# Patient Record
Sex: FEMALE | Race: WHITE | Employment: OTHER | URBAN - NONMETROPOLITAN AREA
[De-identification: names, ages, dates, MRNs, and addresses within clinical notes are randomized per-mention and may not be internally consistent; named-entity substitution may affect disease eponyms.]

---

## 2018-01-23 LAB
CHOLESTEROL, TOTAL: 264 MG/DL
CHOLESTEROL/HDL RATIO: ABNORMAL
HDLC SERPL-MCNC: 56 MG/DL (ref 35–70)
LDL CHOLESTEROL CALCULATED: 174 MG/DL (ref 0–160)
TRIGL SERPL-MCNC: 186 MG/DL
VLDLC SERPL CALC-MCNC: ABNORMAL MG/DL

## 2018-07-30 LAB
AVERAGE GLUCOSE: NORMAL
HBA1C MFR BLD: 6 %

## 2019-06-29 ENCOUNTER — OFFICE VISIT (OUTPATIENT)
Dept: FAMILY MEDICINE CLINIC | Age: 74
End: 2019-06-29
Payer: MEDICARE

## 2019-06-29 VITALS
HEART RATE: 87 BPM | TEMPERATURE: 97 F | OXYGEN SATURATION: 94 % | SYSTOLIC BLOOD PRESSURE: 122 MMHG | DIASTOLIC BLOOD PRESSURE: 82 MMHG | HEIGHT: 64 IN | BODY MASS INDEX: 35.27 KG/M2 | WEIGHT: 206.6 LBS

## 2019-06-29 DIAGNOSIS — R09.82 POSTNASAL DRIP: ICD-10-CM

## 2019-06-29 DIAGNOSIS — L03.213 PRESEPTAL CELLULITIS OF RIGHT LOWER EYELID: ICD-10-CM

## 2019-06-29 DIAGNOSIS — H10.9 CONJUNCTIVITIS OF BOTH EYES, UNSPECIFIED CONJUNCTIVITIS TYPE: ICD-10-CM

## 2019-06-29 DIAGNOSIS — J01.90 ACUTE NON-RECURRENT SINUSITIS, UNSPECIFIED LOCATION: Primary | ICD-10-CM

## 2019-06-29 PROCEDURE — 99214 OFFICE O/P EST MOD 30 MIN: CPT | Performed by: PHYSICIAN ASSISTANT

## 2019-06-29 PROCEDURE — 96372 THER/PROPH/DIAG INJ SC/IM: CPT | Performed by: PHYSICIAN ASSISTANT

## 2019-06-29 RX ORDER — CEFTRIAXONE 1 G/1
1 INJECTION, POWDER, FOR SOLUTION INTRAMUSCULAR; INTRAVENOUS ONCE
Status: COMPLETED | OUTPATIENT
Start: 2019-06-29 | End: 2019-06-29

## 2019-06-29 RX ORDER — SULFAMETHOXAZOLE AND TRIMETHOPRIM 800; 160 MG/1; MG/1
1 TABLET ORAL 2 TIMES DAILY
Qty: 20 TABLET | Refills: 0 | Status: SHIPPED | OUTPATIENT
Start: 2019-06-29 | End: 2019-07-09

## 2019-06-29 RX ORDER — CEFDINIR 300 MG/1
300 CAPSULE ORAL 2 TIMES DAILY
Qty: 20 CAPSULE | Refills: 0 | Status: SHIPPED | OUTPATIENT
Start: 2019-06-29 | End: 2019-07-09

## 2019-06-29 RX ADMIN — CEFTRIAXONE 1 G: 1 INJECTION, POWDER, FOR SOLUTION INTRAMUSCULAR; INTRAVENOUS at 10:14

## 2019-06-29 NOTE — PROGRESS NOTES
19  Sandra Reynolds : 1945 Sex: female  Age 76 y.o. Subjective:  Chief Complaint   Patient presents with    Conjunctivitis     right eye is very red, irritated and swollen and left is red x's 2 days         HPI:   Sandra Reynolds , 76 y.o. female presents to [facility] care for evaluation of conjunctivitis. The patient has had right eye redness and now developing left eye redness. The patient went to the emergency department last night and was placed on Cipro drops. The patient has been taking the Cipro drops and the swelling and redness have been improving but not completely gone. The patient is still having some nasal congestion rhinorrhea. The patient said any chest pain, shortness of breath. The patient and  states that the swelling is improving at this point but is not completely resolved. The patient is not having any fevers or chills. No visual disturbances. The patient does wear glasses. She does not wear contact lenses. The patient denies any chest pain, shortness of breath. Grandchildren had the same and then ultimately had to be placed on oral antibiotics as well. ROS:   Unless otherwise stated in this report the patient's positive and negative responses for review of systems for constitutional, eyes, ENT, cardiovascular, respiratory, gastrointestinal, neurological, , musculoskeletal, and integument systems and related systems to the presenting problem are either stated in the history of present illness or were not pertinent or were negative for the symptoms and/or complaints related to the presenting medical problem. Positives and pertinent negatives as per HPI. All others reviewed and are negative.       PMH:     Past Medical History:   Diagnosis Date    Renal calculus        Past Surgical History:   Procedure Laterality Date    LITHOTRIPSY Left [id number]    OVARIAN CYST REMOVAL       Medications:     Current Outpatient Medications:     Multiple Vitamins-Minerals (THERAPEUTIC MULTIVITAMIN-MINERALS) tablet, Take 1 tablet by mouth daily, Disp: , Rfl:     MAGNESIUM CITRATE PO, Take 200 mg by mouth daily, Disp: , Rfl:     NONFORMULARY, Take by mouth 2 times daily HERBAL STONE FREE TAKES 2 IN AM AND 3 AT HS, Disp: , Rfl:     NONFORMULARY, Take by mouth daily QUERDECIN WITH BROMELAIN, Disp: , Rfl:     UNABLE TO FIND, daily SILVER BIOTIC, Disp: , Rfl:     Coenzyme Q10 (CO Q 10) 100 MG CAPS, Take 100 mg by mouth 2 times daily, Disp: , Rfl:     Pyridoxine HCl (VITAMIN B-6) 100 MG tablet, Take 100 mg by mouth daily, Disp: , Rfl:     NONFORMULARY, 2 times daily VISION ESSENCE, Disp: , Rfl:     NONFORMULARY, 2 times daily KYOLIC  FORMULA 278 - TAKES 2 IN AM AND 2 IN PM,, Disp: , Rfl:     ondansetron (ZOFRAN) 4 MG tablet, Take 1 tablet by mouth every 8 hours as needed for Nausea or Vomiting, Disp: 20 tablet, Rfl: 0    Allergies: Allergies   Allergen Reactions    Claritin [Loratadine] Other (See Comments)     Generalized severe weakness, couldn't move arms or hardly walk    Pcn [Penicillins]        Social History:     Social History     Tobacco Use    Smoking status: Never Smoker    Smokeless tobacco: Never Used   Substance Use Topics    Alcohol use: No    Drug use: No       Physical Exam:     Vitals:    06/29/19 0946   BP: 122/82   Site: Right Upper Arm   Position: Sitting   Cuff Size: Medium Adult   Pulse: 87   Temp: 97 °F (36.1 °C)   SpO2: 94%   Weight: 206 lb 9.6 oz (93.7 kg)   Height: 5' 4\" (1.626 m)       Exam:  Physical Exam  Nurse's notes and vital signs reviewed. The patient is not hypoxic. General: Alert, no acute distress, patient resting comfortably Patient is not toxic or lethargic. Skin: Warm, intact, no pallor noted. There is no evidence of rash at this time. Head: Normocephalic, atraumatic.   Eye: Significant erythema to the right eye, the patient has drainage and discharge noted to both eyes, the patient has worsening redness surrounding the right eye. Although  states that this is significantly improved from yesterday. The patient has no significant tenderness, PERRLA, EOMI, no pain with extraocular eye motion. Ears, Nose, Throat: Right tympanic membrane clear, left tympanic membrane clear. No drainage or discharge noted. No pre- or post-auricular tenderness, erythema, or swelling noted. Moderate nasal congestion rhinorrhea. No facial erythema. Posterior oropharynx shows erythema cobblestoning, but no hypertrophy, asymmetry or peritonsillar abscess and no evidence of exudate. the uvula is midline. No trismus or drooling is noted. Moist mucous membranes. Neck: No anterior/posterior lymphadenopathy noted. No erythema, no masses, no fluctuance or induration noted. No meningeal signs. Cardio: Regular Rate and Rhythm  Respiratory: No acute distress, no rhonchi, wheezing or crackles noted. No stridor or retractions are noted. Neurological: A&O x4, normal speech  Psychiatric: Cooperative         Testing:           Medical Decision Making:     I reviewed the ER report that they had with her. The patient will be continuing on the ciprofloxacin drops. She will be placing the drops in both eyes at this point. We will treat the patient for potential preseptal cellulitis to the right eye with Rocephin and will start the patient on Omnicef and cephalexin. The patient will monitor the area closely. The patient was given intramuscular injection of Rocephin here. The patient will continue the Cipro drops. We will have her back Monday for repeat evaluation. The patient will take the medications around the clock. Patient will present Monday morning for repeat evaluation repeat assessment. The patient was comfortable with this plan has no other questions or concerns at this time. We also discussed the worsening signs and symptoms to go to the emergency department for. She is to use warm compresses.   She will use universal precautions, lots of handwashing      Clinical Impression:   Debi Vila was seen today for conjunctivitis. Diagnoses and all orders for this visit:    Acute non-recurrent sinusitis, unspecified location    Postnasal drip    Conjunctivitis of both eyes, unspecified conjunctivitis type    Preseptal cellulitis of right lower eyelid    Other orders  -     cefdinir (OMNICEF) 300 MG capsule; Take 1 capsule by mouth 2 times daily for 10 days  -     cefTRIAXone (ROCEPHIN) injection 1 g  -     sulfamethoxazole-trimethoprim (BACTRIM DS;SEPTRA DS) 800-160 MG per tablet; Take 1 tablet by mouth 2 times daily for 10 days        The patient is to call for any concerns or return if any of the signs or symptoms worsen. The patient is to follow-up with PCP in the next 2-3 days for repeat evaluation repeat assessment or go directly to the emergency department.      SIGNATURE: Clifford Everett III, PA-C

## 2019-07-01 ENCOUNTER — OFFICE VISIT (OUTPATIENT)
Dept: FAMILY MEDICINE CLINIC | Age: 74
End: 2019-07-01
Payer: MEDICARE

## 2019-07-01 VITALS
WEIGHT: 206.38 LBS | TEMPERATURE: 97.4 F | OXYGEN SATURATION: 97 % | DIASTOLIC BLOOD PRESSURE: 80 MMHG | SYSTOLIC BLOOD PRESSURE: 132 MMHG | BODY MASS INDEX: 35.42 KG/M2 | HEART RATE: 80 BPM

## 2019-07-01 DIAGNOSIS — H10.33 ACUTE BACTERIAL CONJUNCTIVITIS OF BOTH EYES: Primary | ICD-10-CM

## 2019-07-01 PROCEDURE — 99213 OFFICE O/P EST LOW 20 MIN: CPT | Performed by: FAMILY MEDICINE

## 2019-07-01 RX ORDER — LATANOPROST 50 UG/ML
SOLUTION/ DROPS OPHTHALMIC
Refills: 11 | COMMUNITY
Start: 2019-05-29 | End: 2022-06-29

## 2019-07-01 RX ORDER — CIPROFLOXACIN HYDROCHLORIDE 3.5 MG/ML
SOLUTION/ DROPS TOPICAL
Refills: 0 | COMMUNITY
Start: 2019-06-28 | End: 2019-08-08 | Stop reason: ALTCHOICE

## 2019-07-01 ASSESSMENT — ENCOUNTER SYMPTOMS
RHINORRHEA: 1
SORE THROAT: 0
EYE ITCHING: 1
PHOTOPHOBIA: 0
EYE DISCHARGE: 0
DOUBLE VISION: 0
EYE PAIN: 0
EYE REDNESS: 1

## 2019-07-01 NOTE — PROGRESS NOTES
MAGNESIUM CITRATE PO, Take 200 mg by mouth daily, Disp: , Rfl:     NONFORMULARY, Take by mouth 2 times daily HERBAL STONE FREE TAKES 2 IN AM AND 3 AT HS, Disp: , Rfl:     NONFORMULARY, Take by mouth daily QUERDECIN WITH BROMELAIN, Disp: , Rfl:     UNABLE TO FIND, daily SILVER BIOTIC, Disp: , Rfl:     Coenzyme Q10 (CO Q 10) 100 MG CAPS, Take 100 mg by mouth 2 times daily, Disp: , Rfl:     Pyridoxine HCl (VITAMIN B-6) 100 MG tablet, Take 100 mg by mouth daily, Disp: , Rfl:     NONFORMULARY, 2 times daily VISION ESSENCE, Disp: , Rfl:     NONFORMULARY, 2 times daily KYOLIC  FORMULA 908 - TAKES 2 IN AM AND 2 IN PM,, Disp: , Rfl:     ondansetron (ZOFRAN) 4 MG tablet, Take 1 tablet by mouth every 8 hours as needed for Nausea or Vomiting, Disp: 20 tablet, Rfl: 0  Allergies   Allergen Reactions    Claritin [Loratadine] Other (See Comments)     Generalized severe weakness, couldn't move arms or hardly walk    Pcn [Penicillins]     Betamethasone Dipropionate Rash        /80   Pulse 80   Temp 97.4 °F (36.3 °C)   Wt 206 lb 6 oz (93.6 kg)   SpO2 97%   BMI 35.42 kg/m²     EXAM:   Physical Exam   Constitutional: She appears well-developed and well-nourished. HENT:   Head: Normocephalic and atraumatic.   conjuctiva very injected but no eye lid swelling and no crusting about the eyes   Eyes: Pupils are equal, round, and reactive to light. EOM are normal.   Neck: Normal range of motion. Cardiovascular: Normal rate and regular rhythm. Pulmonary/Chest: Effort normal and breath sounds normal.   Skin: Skin is warm and dry. Nursing note and vitals reviewed. Andrew Curtis was seen today for conjunctivitis. Diagnoses and all orders for this visit:    Acute bacterial conjunctivitis of both eyes    bactrim and eye drops  Continue both  F/u with ophthmologist         Seen by:   Binh Glass DO

## 2019-08-08 ENCOUNTER — OFFICE VISIT (OUTPATIENT)
Dept: FAMILY MEDICINE CLINIC | Age: 74
End: 2019-08-08
Payer: MEDICARE

## 2019-08-08 VITALS
HEIGHT: 64 IN | DIASTOLIC BLOOD PRESSURE: 88 MMHG | BODY MASS INDEX: 35.17 KG/M2 | OXYGEN SATURATION: 97 % | WEIGHT: 206 LBS | SYSTOLIC BLOOD PRESSURE: 136 MMHG | HEART RATE: 86 BPM

## 2019-08-08 DIAGNOSIS — M15.9 PRIMARY OSTEOARTHRITIS INVOLVING MULTIPLE JOINTS: ICD-10-CM

## 2019-08-08 DIAGNOSIS — I10 ESSENTIAL HYPERTENSION: Primary | ICD-10-CM

## 2019-08-08 DIAGNOSIS — Z12.39 SCREENING FOR BREAST CANCER: ICD-10-CM

## 2019-08-08 DIAGNOSIS — Z11.59 ENCOUNTER FOR HEPATITIS C SCREENING TEST FOR LOW RISK PATIENT: ICD-10-CM

## 2019-08-08 DIAGNOSIS — E78.49 OTHER HYPERLIPIDEMIA: ICD-10-CM

## 2019-08-08 DIAGNOSIS — Z23 IMMUNIZATION DUE: ICD-10-CM

## 2019-08-08 PROBLEM — M19.90 OSTEOARTHRITIS: Status: ACTIVE | Noted: 2019-08-08

## 2019-08-08 PROCEDURE — 99214 OFFICE O/P EST MOD 30 MIN: CPT | Performed by: FAMILY MEDICINE

## 2019-08-08 RX ORDER — VIT C/B6/B5/MAGNESIUM/HERB 173 50-5-6-5MG
CAPSULE ORAL
COMMUNITY

## 2019-08-08 RX ORDER — CHLORAL HYDRATE 500 MG
1000 CAPSULE ORAL DAILY
COMMUNITY

## 2019-08-08 ASSESSMENT — PATIENT HEALTH QUESTIONNAIRE - PHQ9
SUM OF ALL RESPONSES TO PHQ QUESTIONS 1-9: 0
SUM OF ALL RESPONSES TO PHQ QUESTIONS 1-9: 0
SUM OF ALL RESPONSES TO PHQ9 QUESTIONS 1 & 2: 0
1. LITTLE INTEREST OR PLEASURE IN DOING THINGS: 0
2. FEELING DOWN, DEPRESSED OR HOPELESS: 0

## 2019-08-08 NOTE — PROGRESS NOTES
OFFICE NOTE    19  Name: Mahsa Allen  :1945   Sex:female   Age:74 y.o. SUBJECTIVE  Chief Complaint   Patient presents with    6 Month Follow-Up       HPI Comes in for checkup and refills. On  A number of herbs and vitamins. Feels she is doing well        Review of Systems   Constitutional: Negative for appetite change, fever and unexpected weight change. HENT: Negative for congestion, ear pain and postnasal drip. Eyes: Negative. Negative for photophobia, redness and visual disturbance. Respiratory: Negative for cough, chest tightness and wheezing. Cardiovascular: Negative for chest pain and palpitations. Gastrointestinal: Negative for abdominal pain, blood in stool, constipation, diarrhea and vomiting. Endocrine: Negative for cold intolerance, polydipsia and polyuria. Genitourinary: Negative for dysuria and hematuria. Musculoskeletal: Positive for arthralgias. Negative for gait problem and joint swelling. Skin: Negative for rash and wound. Allergic/Immunologic: Negative for environmental allergies and food allergies. Neurological: Negative for dizziness, tremors, weakness, numbness and headaches. Hematological: Negative for adenopathy. Does not bruise/bleed easily. Psychiatric/Behavioral: Negative for behavioral problems, confusion, dysphoric mood and sleep disturbance. The patient is nervous/anxious.              Current Outpatient Medications:     Omega-3 Fatty Acids (FISH OIL) 1000 MG CAPS, Take 1,000 mg by mouth daily, Disp: , Rfl:     Cholecalciferol (VITAMIN D3) 5000 units TABS, Take 1 tablet by mouth daily, Disp: , Rfl:     POLICOSANOL PO, Take by mouth, Disp: , Rfl:     Turmeric 500 MG CAPS, Take by mouth, Disp: , Rfl:     MAGNESIUM PO, Take by mouth, Disp: , Rfl:     latanoprost (XALATAN) 0.005 % ophthalmic solution, U 1 GTT IN OU QHS, Disp: , Rfl: 11    Multiple Vitamins-Minerals (THERAPEUTIC MULTIVITAMIN-MINERALS) tablet, Take 1 tablet by mouth daily, Disp: , Rfl:     Coenzyme Q10 (CO Q 10) 100 MG CAPS, Take 100 mg by mouth 2 times daily, Disp: , Rfl:     NONFORMULARY, 2 times daily VISION ESSENCE, Disp: , Rfl:     NONFORMULARY, 2 times daily KYOLIC  FORMULA 615 - TAKES 2 IN AM AND 2 IN PM,, Disp: , Rfl:   Allergies   Allergen Reactions    Claritin [Loratadine] Other (See Comments)     Generalized severe weakness, couldn't move arms or hardly walk    Pcn [Penicillins]     Betamethasone Dipropionate Rash       Past Medical History:   Diagnosis Date    Breast cyst, right     benign    History of fracture of upper extremity     bilateral, fell off a horse and out of a tree    History of ovarian cyst     Hypertension     Osteoarthritis     knee    Renal calculus      Past Surgical History:   Procedure Laterality Date    LITHOTRIPSY Left [id number]    OTHER SURGICAL HISTORY      has acupuncture tx with Dr. Umair Dickens in [address] [place] [address]       Family History   Problem Relation Age of Onset    Ovarian Cancer Mother     Heart Failure Father     Other Father         AAA    No Known Problems Sister     Alcohol Abuse Brother     Stroke Sister         small    Arthritis Sister         TKR     Social History     Tobacco History     Smoking Status  Never Smoker    Smokeless Tobacco Use  Never Used          Alcohol History     Alcohol Use Status  No Comment  occasional glass of wine          Drug Use     Drug Use Status  No          Sexual Activity     Sexually Active  Not Asked                OBJECTIVE  Vitals:    08/08/19 1403   BP: 136/88   Pulse: 86   SpO2: 97%   Weight: 206 lb (93.4 kg)   Height: 5' 4\" (1.626 m)       Body mass index is 35.36 kg/m².     Orders Placed This Encounter   Procedures    JULIEN CAD SCREENING     Standing Status:   Future     Standing Expiration Date:   10/8/2020    CBC Auto Differential     Standing Status:   Future     Standing Expiration Date:   8/8/2020    Comprehensive Metabolic Panel     Standing Status:

## 2019-08-09 ASSESSMENT — ENCOUNTER SYMPTOMS
CONSTIPATION: 0
EYES NEGATIVE: 1
WHEEZING: 0
EYE REDNESS: 0
COUGH: 0
PHOTOPHOBIA: 0
DIARRHEA: 0
ABDOMINAL PAIN: 0
CHEST TIGHTNESS: 0
VOMITING: 0
BLOOD IN STOOL: 0

## 2019-08-29 ENCOUNTER — HOSPITAL ENCOUNTER (OUTPATIENT)
Age: 74
Discharge: HOME | End: 2019-08-31
Payer: MEDICARE

## 2019-08-29 DIAGNOSIS — Z11.59 ENCOUNTER FOR HEPATITIS C SCREENING TEST FOR LOW RISK PATIENT: ICD-10-CM

## 2019-08-29 DIAGNOSIS — E78.49 OTHER HYPERLIPIDEMIA: ICD-10-CM

## 2019-08-29 DIAGNOSIS — I10 ESSENTIAL HYPERTENSION: ICD-10-CM

## 2019-08-29 LAB
ALBUMIN SERPL-MCNC: 4.3 G/DL (ref 3.5–5.2)
ALP BLD-CCNC: 54 U/L (ref 35–104)
ALT SERPL-CCNC: 20 U/L (ref 0–32)
ANION GAP SERPL CALCULATED.3IONS-SCNC: 15 MMOL/L (ref 7–16)
AST SERPL-CCNC: 16 U/L (ref 0–31)
BASOPHILS ABSOLUTE: 0.04 E9/L (ref 0–0.2)
BASOPHILS RELATIVE PERCENT: 0.8 % (ref 0–2)
BILIRUB SERPL-MCNC: 0.5 MG/DL (ref 0–1.2)
BUN BLDV-MCNC: 19 MG/DL (ref 8–23)
CALCIUM SERPL-MCNC: 9.7 MG/DL (ref 8.6–10.2)
CHLORIDE BLD-SCNC: 103 MMOL/L (ref 98–107)
CHOLESTEROL, TOTAL: 291 MG/DL (ref 0–199)
CO2: 24 MMOL/L (ref 22–29)
CREAT SERPL-MCNC: 0.8 MG/DL (ref 0.5–1)
EOSINOPHILS ABSOLUTE: 0.11 E9/L (ref 0.05–0.5)
EOSINOPHILS RELATIVE PERCENT: 2.1 % (ref 0–6)
GFR AFRICAN AMERICAN: >60
GFR NON-AFRICAN AMERICAN: >60 ML/MIN/1.73
GLUCOSE BLD-MCNC: 118 MG/DL (ref 74–99)
HCT VFR BLD CALC: 46.2 % (ref 34–48)
HDLC SERPL-MCNC: 63 MG/DL
HEMOGLOBIN: 14.9 G/DL (ref 11.5–15.5)
IMMATURE GRANULOCYTES #: 0.01 E9/L
IMMATURE GRANULOCYTES %: 0.2 % (ref 0–5)
LDL CHOLESTEROL CALCULATED: 204 MG/DL (ref 0–99)
LYMPHOCYTES ABSOLUTE: 1.42 E9/L (ref 1.5–4)
LYMPHOCYTES RELATIVE PERCENT: 27.6 % (ref 20–42)
MCH RBC QN AUTO: 30 PG (ref 26–35)
MCHC RBC AUTO-ENTMCNC: 32.3 % (ref 32–34.5)
MCV RBC AUTO: 93.1 FL (ref 80–99.9)
MONOCYTES ABSOLUTE: 0.62 E9/L (ref 0.1–0.95)
MONOCYTES RELATIVE PERCENT: 12.1 % (ref 2–12)
NEUTROPHILS ABSOLUTE: 2.94 E9/L (ref 1.8–7.3)
NEUTROPHILS RELATIVE PERCENT: 57.2 % (ref 43–80)
PDW BLD-RTO: 13.3 FL (ref 11.5–15)
PLATELET # BLD: 259 E9/L (ref 130–450)
PMV BLD AUTO: 10.7 FL (ref 7–12)
POTASSIUM SERPL-SCNC: 4.4 MMOL/L (ref 3.5–5)
RBC # BLD: 4.96 E12/L (ref 3.5–5.5)
SODIUM BLD-SCNC: 142 MMOL/L (ref 132–146)
TOTAL PROTEIN: 7.4 G/DL (ref 6.4–8.3)
TRIGL SERPL-MCNC: 121 MG/DL (ref 0–149)
TSH SERPL DL<=0.05 MIU/L-ACNC: 8.34 UIU/ML (ref 0.27–4.2)
VLDLC SERPL CALC-MCNC: 24 MG/DL
WBC # BLD: 5.1 E9/L (ref 4.5–11.5)

## 2019-08-29 PROCEDURE — 36415 COLL VENOUS BLD VENIPUNCTURE: CPT

## 2019-08-29 PROCEDURE — 84443 ASSAY THYROID STIM HORMONE: CPT

## 2019-08-29 PROCEDURE — 80061 LIPID PANEL: CPT

## 2019-08-29 PROCEDURE — 80053 COMPREHEN METABOLIC PANEL: CPT

## 2019-08-29 PROCEDURE — 85025 COMPLETE CBC W/AUTO DIFF WBC: CPT

## 2019-08-29 PROCEDURE — 86803 HEPATITIS C AB TEST: CPT

## 2019-08-30 LAB — HEPATITIS C ANTIBODY INTERPRETATION: NORMAL

## 2020-02-20 ENCOUNTER — OFFICE VISIT (OUTPATIENT)
Dept: FAMILY MEDICINE CLINIC | Age: 75
End: 2020-02-20
Payer: MEDICARE

## 2020-02-20 VITALS
WEIGHT: 208 LBS | BODY MASS INDEX: 35.7 KG/M2 | OXYGEN SATURATION: 96 % | HEART RATE: 74 BPM | TEMPERATURE: 98.2 F | DIASTOLIC BLOOD PRESSURE: 80 MMHG | SYSTOLIC BLOOD PRESSURE: 126 MMHG

## 2020-02-20 PROBLEM — H40.1190 PRIMARY OPEN ANGLE GLAUCOMA (POAG): Status: ACTIVE | Noted: 2020-02-20

## 2020-02-20 PROBLEM — R73.9 HYPERGLYCEMIA: Status: ACTIVE | Noted: 2020-02-20

## 2020-02-20 PROBLEM — E78.41 ELEVATED LIPOPROTEIN(A): Status: ACTIVE | Noted: 2020-02-20

## 2020-02-20 LAB
BILIRUBIN, POC: NEGATIVE
BLOOD URINE, POC: ABNORMAL
CHP ED QC CHECK: NORMAL
CLARITY, POC: CLEAR
COLOR, POC: YELLOW
GLUCOSE BLD-MCNC: 90 MG/DL
GLUCOSE URINE, POC: NEGATIVE
HBA1C MFR BLD: 5.5 %
KETONES, POC: NEGATIVE
LEUKOCYTE EST, POC: ABNORMAL
NITRITE, POC: NEGATIVE
PH, POC: 6.5
PROTEIN, POC: NEGATIVE
SPECIFIC GRAVITY, POC: 1.02
UROBILINOGEN, POC: ABNORMAL

## 2020-02-20 PROCEDURE — 1036F TOBACCO NON-USER: CPT | Performed by: FAMILY MEDICINE

## 2020-02-20 PROCEDURE — 82962 GLUCOSE BLOOD TEST: CPT | Performed by: FAMILY MEDICINE

## 2020-02-20 PROCEDURE — G8484 FLU IMMUNIZE NO ADMIN: HCPCS | Performed by: FAMILY MEDICINE

## 2020-02-20 PROCEDURE — 4040F PNEUMOC VAC/ADMIN/RCVD: CPT | Performed by: FAMILY MEDICINE

## 2020-02-20 PROCEDURE — 81002 URINALYSIS NONAUTO W/O SCOPE: CPT | Performed by: FAMILY MEDICINE

## 2020-02-20 PROCEDURE — G8400 PT W/DXA NO RESULTS DOC: HCPCS | Performed by: FAMILY MEDICINE

## 2020-02-20 PROCEDURE — 83036 HEMOGLOBIN GLYCOSYLATED A1C: CPT | Performed by: FAMILY MEDICINE

## 2020-02-20 PROCEDURE — 93000 ELECTROCARDIOGRAM COMPLETE: CPT | Performed by: FAMILY MEDICINE

## 2020-02-20 PROCEDURE — 99214 OFFICE O/P EST MOD 30 MIN: CPT | Performed by: FAMILY MEDICINE

## 2020-02-20 PROCEDURE — 1123F ACP DISCUSS/DSCN MKR DOCD: CPT | Performed by: FAMILY MEDICINE

## 2020-02-20 PROCEDURE — G8427 DOCREV CUR MEDS BY ELIG CLIN: HCPCS | Performed by: FAMILY MEDICINE

## 2020-02-20 PROCEDURE — 3017F COLORECTAL CA SCREEN DOC REV: CPT | Performed by: FAMILY MEDICINE

## 2020-02-20 PROCEDURE — 1090F PRES/ABSN URINE INCON ASSESS: CPT | Performed by: FAMILY MEDICINE

## 2020-02-20 PROCEDURE — G8417 CALC BMI ABV UP PARAM F/U: HCPCS | Performed by: FAMILY MEDICINE

## 2020-02-20 RX ORDER — TRIAMCINOLONE ACETONIDE 1 MG/G
CREAM TOPICAL
Qty: 1 TUBE | Refills: 2 | Status: SHIPPED
Start: 2020-02-20 | End: 2020-08-25 | Stop reason: SDUPTHER

## 2020-02-20 ASSESSMENT — ENCOUNTER SYMPTOMS
COUGH: 0
EYES NEGATIVE: 1
WHEEZING: 0
ABDOMINAL PAIN: 0
VOMITING: 0
DIARRHEA: 0
BLOOD IN STOOL: 0
CONSTIPATION: 0
CHEST TIGHTNESS: 0

## 2020-02-20 ASSESSMENT — PATIENT HEALTH QUESTIONNAIRE - PHQ9
SUM OF ALL RESPONSES TO PHQ QUESTIONS 1-9: 0
1. LITTLE INTEREST OR PLEASURE IN DOING THINGS: 0
2. FEELING DOWN, DEPRESSED OR HOPELESS: 0
SUM OF ALL RESPONSES TO PHQ9 QUESTIONS 1 & 2: 0
SUM OF ALL RESPONSES TO PHQ QUESTIONS 1-9: 0

## 2020-02-20 NOTE — PROGRESS NOTES
OFFICE NOTE    20  Name: Bulmaro Huddleston  :1945   Sex:female   Age:75 y.o. SUBJECTIVE  Chief Complaint   Patient presents with    Rash     needs new Rx for triamcinolone cream       Pt presents for routine follow up. Requires routine medication refills. UTD on vaccinations. Denies new s/s or complaints. Had mammogram done at [facility]. Refuses flu shot. Avoids meds as much as she possibley can           Review of Systems   Constitutional: Negative for appetite change, fever and unexpected weight change. HENT: Negative for congestion, ear pain and postnasal drip. Eyes: Negative. Respiratory: Negative for cough, chest tightness and wheezing. Cardiovascular: Negative for chest pain and palpitations. Gastrointestinal: Negative for abdominal pain, blood in stool, constipation, diarrhea and vomiting. Endocrine: Negative for cold intolerance, polydipsia and polyuria. Genitourinary: Negative for dysuria and hematuria. Musculoskeletal: Negative for arthralgias, gait problem and joint swelling. Skin: Positive for rash. Negative for wound. Allergic/Immunologic: Negative for environmental allergies and food allergies. Neurological: Negative for dizziness, tremors, weakness and headaches. Hematological: Negative for adenopathy. Does not bruise/bleed easily. Psychiatric/Behavioral: Negative for behavioral problems, confusion, dysphoric mood and sleep disturbance. Current Outpatient Medications:     triamcinolone (KENALOG) 0.1 % cream, Apply topically 2 times daily. , Disp: 1 Tube, Rfl: 2    Omega-3 Fatty Acids (FISH OIL) 1000 MG CAPS, Take 1,000 mg by mouth daily, Disp: , Rfl:     Cholecalciferol (VITAMIN D3) 5000 units TABS, Take 1 tablet by mouth daily, Disp: , Rfl:     POLICOSANOL PO, Take by mouth, Disp: , Rfl:     Turmeric 500 MG CAPS, Take by mouth, Disp: , Rfl:     MAGNESIUM PO, Take by mouth, Disp: , Rfl:     latanoprost (XALATAN) 0.005 % ophthalmic solution, U

## 2020-08-25 ENCOUNTER — OFFICE VISIT (OUTPATIENT)
Dept: FAMILY MEDICINE CLINIC | Age: 75
End: 2020-08-25
Payer: MEDICARE

## 2020-08-25 VITALS
WEIGHT: 206.8 LBS | HEART RATE: 84 BPM | HEIGHT: 65 IN | BODY MASS INDEX: 34.45 KG/M2 | DIASTOLIC BLOOD PRESSURE: 84 MMHG | TEMPERATURE: 97.8 F | OXYGEN SATURATION: 95 % | SYSTOLIC BLOOD PRESSURE: 118 MMHG

## 2020-08-25 PROBLEM — E66.01 MORBIDLY OBESE (HCC): Status: ACTIVE | Noted: 2020-08-25

## 2020-08-25 PROCEDURE — 4040F PNEUMOC VAC/ADMIN/RCVD: CPT | Performed by: FAMILY MEDICINE

## 2020-08-25 PROCEDURE — 1036F TOBACCO NON-USER: CPT | Performed by: FAMILY MEDICINE

## 2020-08-25 PROCEDURE — 1123F ACP DISCUSS/DSCN MKR DOCD: CPT | Performed by: FAMILY MEDICINE

## 2020-08-25 PROCEDURE — 1090F PRES/ABSN URINE INCON ASSESS: CPT | Performed by: FAMILY MEDICINE

## 2020-08-25 PROCEDURE — 93000 ELECTROCARDIOGRAM COMPLETE: CPT | Performed by: FAMILY MEDICINE

## 2020-08-25 PROCEDURE — G8427 DOCREV CUR MEDS BY ELIG CLIN: HCPCS | Performed by: FAMILY MEDICINE

## 2020-08-25 PROCEDURE — 3017F COLORECTAL CA SCREEN DOC REV: CPT | Performed by: FAMILY MEDICINE

## 2020-08-25 PROCEDURE — 99214 OFFICE O/P EST MOD 30 MIN: CPT | Performed by: FAMILY MEDICINE

## 2020-08-25 PROCEDURE — G8400 PT W/DXA NO RESULTS DOC: HCPCS | Performed by: FAMILY MEDICINE

## 2020-08-25 PROCEDURE — G8417 CALC BMI ABV UP PARAM F/U: HCPCS | Performed by: FAMILY MEDICINE

## 2020-08-25 RX ORDER — TRIAMCINOLONE ACETONIDE 1 MG/G
CREAM TOPICAL
Qty: 1 TUBE | Refills: 2 | Status: SHIPPED
Start: 2020-08-25 | End: 2021-08-25 | Stop reason: SDUPTHER

## 2020-08-25 ASSESSMENT — PATIENT HEALTH QUESTIONNAIRE - PHQ9
2. FEELING DOWN, DEPRESSED OR HOPELESS: 0
1. LITTLE INTEREST OR PLEASURE IN DOING THINGS: 0
SUM OF ALL RESPONSES TO PHQ QUESTIONS 1-9: 0
SUM OF ALL RESPONSES TO PHQ9 QUESTIONS 1 & 2: 0
SUM OF ALL RESPONSES TO PHQ QUESTIONS 1-9: 0

## 2020-08-25 NOTE — PROGRESS NOTES
OFFICE NOTE    20  Name: Satnam Brooks  :1945   Sex:female   Age:75 y.o. SUBJECTIVE  Chief Complaint   Patient presents with    Arthritis       HPI comes in for checkup and refills. Biggest complaint is OA, takes a number of OTC supplements    Review of Systems   Constitutional: Negative for appetite change, fever and unexpected weight change. HENT: Negative for congestion, ear pain, postnasal drip, sinus pain and trouble swallowing. Eyes: Negative. Negative for photophobia, redness and visual disturbance. Respiratory: Negative for cough, chest tightness and wheezing. Cardiovascular: Negative for chest pain and palpitations. Gastrointestinal: Negative for abdominal pain, blood in stool, constipation, diarrhea and vomiting. Endocrine: Negative for cold intolerance, polydipsia and polyuria. Genitourinary: Negative for dysuria and hematuria. Musculoskeletal: Positive for arthralgias. Negative for gait problem and joint swelling. Skin: Negative for rash and wound. Allergic/Immunologic: Negative for environmental allergies and food allergies. Neurological: Negative for dizziness, tremors, seizures, weakness, numbness and headaches. Hematological: Negative for adenopathy. Does not bruise/bleed easily. Psychiatric/Behavioral: Negative for behavioral problems, confusion, dysphoric mood and sleep disturbance. Current Outpatient Medications:     triamcinolone (KENALOG) 0.1 % cream, Apply topically 2 times daily. , Disp: 1 Tube, Rfl: 2    Omega-3 Fatty Acids (FISH OIL) 1000 MG CAPS, Take 1,000 mg by mouth daily, Disp: , Rfl:     Cholecalciferol (VITAMIN D3) 5000 units TABS, Take 1 tablet by mouth daily, Disp: , Rfl:     POLICOSANOL PO, Take by mouth, Disp: , Rfl:     Turmeric 500 MG CAPS, Take by mouth, Disp: , Rfl:     MAGNESIUM PO, Take by mouth, Disp: , Rfl:     latanoprost (XALATAN) 0.005 % ophthalmic solution, U 1 GTT IN OU QHS, Disp: , Rfl: 11   Multiple Vitamins-Minerals (THERAPEUTIC MULTIVITAMIN-MINERALS) tablet, Take 1 tablet by mouth daily, Disp: , Rfl:     Coenzyme Q10 (CO Q 10) 100 MG CAPS, Take 100 mg by mouth 2 times daily, Disp: , Rfl:     NONFORMULARY, 2 times daily VISION ESSENCE, Disp: , Rfl:     NONFORMULARY, 2 times daily KYOLIC  FORMULA 538 - TAKES 2 IN AM AND 2 IN PM,, Disp: , Rfl:   Allergies   Allergen Reactions    Claritin [Loratadine] Other (See Comments)     Generalized severe weakness, couldn't move arms or hardly walk    Pcn [Penicillins]     Betamethasone Dipropionate Rash       Past Medical History:   Diagnosis Date    Breast cyst, right     benign    History of fracture of upper extremity     bilateral, fell off a horse and out of a tree    History of ovarian cyst     Hypertension     Osteoarthritis     knee    Renal calculus      Past Surgical History:   Procedure Laterality Date    LITHOTRIPSY Left [id number]    OTHER SURGICAL HISTORY      has acupuncture tx with Dr. Christiano Estes in [address] [place] [address]       Family History   Problem Relation Age of Onset    Ovarian Cancer Mother     Heart Failure Father     Other Father         AAA    No Known Problems Sister     Alcohol Abuse Brother     Stroke Sister         small    Arthritis Sister         TKR     Social History     Tobacco History     Smoking Status  Never Smoker    Smokeless Tobacco Use  Never Used          Alcohol History     Alcohol Use Status  No Comment  occasional glass of wine          Drug Use     Drug Use Status  No          Sexual Activity     Sexually Active  Not Currently Partners  Male Birth Control/Protection  Post-menopausal                OBJECTIVE  Vitals:    08/25/20 1310   BP: 118/84   Site: Right Upper Arm   Position: Sitting   Pulse: 84   Temp: 97.8 °F (36.6 °C)   TempSrc: Temporal   SpO2: 95%   Weight: 206 lb 12.8 oz (93.8 kg)   Height: 5' 5\" (1.651 m)        Body mass index is 34.41 kg/m².     Orders Placed This Encounter Procedures    JULIEN HUGO DIGITAL SCREEN BILATERAL PER PROTOCOL     Further imaging can be completed per [address] protocol     Standing Status:   Future     Standing Expiration Date:   10/25/2021    DEXA BONE DENSITY AXIAL SKELETON     Standing Status:   Future     Standing Expiration Date:   8/25/2021    CBC Auto Differential     Standing Status:   Future     Number of Occurrences:   1     Standing Expiration Date:   8/25/2021    Comprehensive Metabolic Panel     Standing Status:   Future     Number of Occurrences:   1     Standing Expiration Date:   8/25/2021    Lipid Panel     Standing Status:   Future     Number of Occurrences:   1     Standing Expiration Date:   8/25/2021     Order Specific Question:   Is Patient Fasting?/# of Hours     Answer:   fasting    TSH without Reflex     Standing Status:   Future     Number of Occurrences:   1     Standing Expiration Date:   8/25/2021    POCT Fit Test     Standing Status:   Future     Standing Expiration Date:   8/25/2021    EKG 12 Lead     Standing Status:   Future     Number of Occurrences:   1     Standing Expiration Date:   8/25/2021     Order Specific Question:   Reason for Exam?     Answer: Other        EXAM   Physical Exam  Vitals signs and nursing note reviewed. Constitutional:       Appearance: Normal appearance. She is well-developed. She is obese. HENT:      Right Ear: Tympanic membrane, ear canal and external ear normal.      Left Ear: Tympanic membrane, ear canal and external ear normal.      Nose: Nose normal.   Eyes:      General: No scleral icterus. Conjunctiva/sclera: Conjunctivae normal.      Pupils: Pupils are equal, round, and reactive to light. Neck:      Musculoskeletal: Normal range of motion and neck supple. Thyroid: No thyroid mass or thyromegaly. Vascular: No carotid bruit or JVD. Trachea: Trachea normal.   Cardiovascular:      Rate and Rhythm: Normal rate and regular rhythm.       Pulses: Normal

## 2020-08-26 ENCOUNTER — HOSPITAL ENCOUNTER (OUTPATIENT)
Age: 75
Discharge: HOME | End: 2020-08-28
Payer: MEDICARE

## 2020-08-26 LAB
ALBUMIN SERPL-MCNC: 4 G/DL (ref 3.5–5.2)
ALP BLD-CCNC: 51 U/L (ref 35–104)
ALT SERPL-CCNC: 18 U/L (ref 0–32)
ANION GAP SERPL CALCULATED.3IONS-SCNC: 16 MMOL/L (ref 7–16)
AST SERPL-CCNC: 15 U/L (ref 0–31)
BASOPHILS ABSOLUTE: 0.05 E9/L (ref 0–0.2)
BASOPHILS RELATIVE PERCENT: 0.9 % (ref 0–2)
BILIRUB SERPL-MCNC: 0.5 MG/DL (ref 0–1.2)
BUN BLDV-MCNC: 18 MG/DL (ref 8–23)
CALCIUM SERPL-MCNC: 9.5 MG/DL (ref 8.6–10.2)
CHLORIDE BLD-SCNC: 105 MMOL/L (ref 98–107)
CHOLESTEROL, TOTAL: 291 MG/DL (ref 0–199)
CO2: 22 MMOL/L (ref 22–29)
CREAT SERPL-MCNC: 0.7 MG/DL (ref 0.5–1)
EOSINOPHILS ABSOLUTE: 0.28 E9/L (ref 0.05–0.5)
EOSINOPHILS RELATIVE PERCENT: 4.9 % (ref 0–6)
GFR AFRICAN AMERICAN: >60
GFR NON-AFRICAN AMERICAN: >60 ML/MIN/1.73
GLUCOSE BLD-MCNC: 106 MG/DL (ref 74–99)
HCT VFR BLD CALC: 46.7 % (ref 34–48)
HDLC SERPL-MCNC: 65 MG/DL
HEMOGLOBIN: 15 G/DL (ref 11.5–15.5)
IMMATURE GRANULOCYTES #: 0.02 E9/L
IMMATURE GRANULOCYTES %: 0.4 % (ref 0–5)
LDL CHOLESTEROL CALCULATED: 205 MG/DL (ref 0–99)
LYMPHOCYTES ABSOLUTE: 1.69 E9/L (ref 1.5–4)
LYMPHOCYTES RELATIVE PERCENT: 29.8 % (ref 20–42)
MCH RBC QN AUTO: 29.8 PG (ref 26–35)
MCHC RBC AUTO-ENTMCNC: 32.1 % (ref 32–34.5)
MCV RBC AUTO: 92.7 FL (ref 80–99.9)
MONOCYTES ABSOLUTE: 0.64 E9/L (ref 0.1–0.95)
MONOCYTES RELATIVE PERCENT: 11.3 % (ref 2–12)
NEUTROPHILS ABSOLUTE: 2.99 E9/L (ref 1.8–7.3)
NEUTROPHILS RELATIVE PERCENT: 52.7 % (ref 43–80)
PDW BLD-RTO: 13.2 FL (ref 11.5–15)
PLATELET # BLD: 247 E9/L (ref 130–450)
PMV BLD AUTO: 10.4 FL (ref 7–12)
POTASSIUM SERPL-SCNC: 4.3 MMOL/L (ref 3.5–5)
RBC # BLD: 5.04 E12/L (ref 3.5–5.5)
SODIUM BLD-SCNC: 143 MMOL/L (ref 132–146)
TOTAL PROTEIN: 6.8 G/DL (ref 6.4–8.3)
TRIGL SERPL-MCNC: 103 MG/DL (ref 0–149)
TSH SERPL DL<=0.05 MIU/L-ACNC: 9.03 UIU/ML (ref 0.27–4.2)
VLDLC SERPL CALC-MCNC: 21 MG/DL
WBC # BLD: 5.7 E9/L (ref 4.5–11.5)

## 2020-08-26 PROCEDURE — 80061 LIPID PANEL: CPT

## 2020-08-26 PROCEDURE — 36415 COLL VENOUS BLD VENIPUNCTURE: CPT

## 2020-08-26 PROCEDURE — 84443 ASSAY THYROID STIM HORMONE: CPT

## 2020-08-26 PROCEDURE — 85025 COMPLETE CBC W/AUTO DIFF WBC: CPT

## 2020-08-26 PROCEDURE — 80053 COMPREHEN METABOLIC PANEL: CPT

## 2020-08-26 ASSESSMENT — ENCOUNTER SYMPTOMS
CONSTIPATION: 0
WHEEZING: 0
BLOOD IN STOOL: 0
COUGH: 0
VOMITING: 0
EYE REDNESS: 0
TROUBLE SWALLOWING: 0
CHEST TIGHTNESS: 0
PHOTOPHOBIA: 0
EYES NEGATIVE: 1
DIARRHEA: 0
ABDOMINAL PAIN: 0
SINUS PAIN: 0

## 2020-08-27 NOTE — RESULT ENCOUNTER NOTE
TSH elevated. Suggest we put her on low dose replacement therapy. This probably part of reason lipids elevated also and treating this (her thyroid) will improve that some also.  If agrees will put her on 50 mcg synthroid daily to replace her deficiency

## 2020-09-11 ENCOUNTER — TELEPHONE (OUTPATIENT)
Dept: FAMILY MEDICINE CLINIC | Age: 75
End: 2020-09-11

## 2020-09-11 RX ORDER — LEVOTHYROXINE SODIUM 0.05 MG/1
50 TABLET ORAL DAILY
Qty: 90 TABLET | Refills: 1 | Status: SHIPPED
Start: 2020-09-11 | End: 2021-08-25

## 2020-09-11 NOTE — TELEPHONE ENCOUNTER
Am somewhat at a loss to explain how this got past us without her being notified. Am sorry. Looks a little underactive on thyroid. Much of the elevation in lipids could be due to this and should improve some with correction.  Would treat, RX called in and recheck labs in 3 mos

## 2020-09-17 NOTE — TELEPHONE ENCOUNTER
blood work was done 8/26 with a check in time with the lab at 8:29am. Attempted to reach out to speak with patient regarding risks (nerve damage, heart issues) up to and including death of not taking medication, but no answer.  Left msg to return call

## 2020-09-17 NOTE — TELEPHONE ENCOUNTER
Alfonzo Guillen notified. She states she is not going to take your rx; levothyroxine. She does not feel your lab test is accurate. A thyroid lab must be drawn before 0900 a.m. She will not take rx or repeat lab.

## 2021-08-25 ENCOUNTER — OFFICE VISIT (OUTPATIENT)
Dept: FAMILY MEDICINE CLINIC | Age: 76
End: 2021-08-25
Payer: MEDICARE

## 2021-08-25 VITALS
DIASTOLIC BLOOD PRESSURE: 94 MMHG | HEART RATE: 86 BPM | OXYGEN SATURATION: 97 % | BODY MASS INDEX: 33.71 KG/M2 | TEMPERATURE: 97.4 F | SYSTOLIC BLOOD PRESSURE: 146 MMHG | WEIGHT: 202.6 LBS

## 2021-08-25 DIAGNOSIS — E03.4 HYPOTHYROIDISM DUE TO ACQUIRED ATROPHY OF THYROID: Primary | ICD-10-CM

## 2021-08-25 DIAGNOSIS — H40.1131 PRIMARY OPEN ANGLE GLAUCOMA (POAG) OF BOTH EYES, MILD STAGE: ICD-10-CM

## 2021-08-25 DIAGNOSIS — E78.49 OTHER HYPERLIPIDEMIA: ICD-10-CM

## 2021-08-25 DIAGNOSIS — Z91.199 NON COMPLIANCE WITH MEDICAL TREATMENT: ICD-10-CM

## 2021-08-25 PROCEDURE — G8400 PT W/DXA NO RESULTS DOC: HCPCS | Performed by: FAMILY MEDICINE

## 2021-08-25 PROCEDURE — 93000 ELECTROCARDIOGRAM COMPLETE: CPT | Performed by: FAMILY MEDICINE

## 2021-08-25 PROCEDURE — 1090F PRES/ABSN URINE INCON ASSESS: CPT | Performed by: FAMILY MEDICINE

## 2021-08-25 PROCEDURE — 1123F ACP DISCUSS/DSCN MKR DOCD: CPT | Performed by: FAMILY MEDICINE

## 2021-08-25 PROCEDURE — G8417 CALC BMI ABV UP PARAM F/U: HCPCS | Performed by: FAMILY MEDICINE

## 2021-08-25 PROCEDURE — 1036F TOBACCO NON-USER: CPT | Performed by: FAMILY MEDICINE

## 2021-08-25 PROCEDURE — G8427 DOCREV CUR MEDS BY ELIG CLIN: HCPCS | Performed by: FAMILY MEDICINE

## 2021-08-25 PROCEDURE — 4040F PNEUMOC VAC/ADMIN/RCVD: CPT | Performed by: FAMILY MEDICINE

## 2021-08-25 PROCEDURE — 99214 OFFICE O/P EST MOD 30 MIN: CPT | Performed by: FAMILY MEDICINE

## 2021-08-25 RX ORDER — TRIAMCINOLONE ACETONIDE 1 MG/G
CREAM TOPICAL
Qty: 453.6 G | Refills: 2 | Status: SHIPPED
Start: 2021-08-25 | End: 2022-09-07 | Stop reason: SDUPTHER

## 2021-08-25 ASSESSMENT — ENCOUNTER SYMPTOMS
SINUS PRESSURE: 1
CONSTIPATION: 0
CHEST TIGHTNESS: 0
EYE REDNESS: 0
ABDOMINAL PAIN: 0
DIARRHEA: 0
COUGH: 1
SHORTNESS OF BREATH: 0
EYES NEGATIVE: 1
WHEEZING: 0
BLOOD IN STOOL: 0
PHOTOPHOBIA: 0
VOMITING: 0

## 2021-08-25 ASSESSMENT — PATIENT HEALTH QUESTIONNAIRE - PHQ9
2. FEELING DOWN, DEPRESSED OR HOPELESS: 0
SUM OF ALL RESPONSES TO PHQ QUESTIONS 1-9: 0
SUM OF ALL RESPONSES TO PHQ9 QUESTIONS 1 & 2: 0
1. LITTLE INTEREST OR PLEASURE IN DOING THINGS: 0

## 2021-08-25 NOTE — PROGRESS NOTES
OFFICE NOTE    21  Name: Bobby White  :1945   Sex:female   Age:76 y.o. SUBJECTIVE  No chief complaint on file. Patient presents for routine follow up. Denies new complaints or concerns. Stopped Synthroid because she says it gives her bad dreams. Requires routine medicaiotn refills. took it for 11 weeks. Says her chiropractor told her she was in balance. Told me Dr. Thuan Heller was treating her for HTN but made her worse, so she stopped all BP meds some time ago. Refuses mammogram, DEXA, colonoscopy. Review of Systems   Constitutional: Positive for fatigue. Negative for appetite change, fever and unexpected weight change. HENT: Positive for congestion and sinus pressure. Negative for ear pain and postnasal drip. Eyes: Negative. Negative for photophobia, redness and visual disturbance. Respiratory: Positive for cough. Negative for chest tightness, shortness of breath and wheezing. Cardiovascular: Positive for leg swelling. Negative for chest pain and palpitations. Gastrointestinal: Negative for abdominal pain, blood in stool, constipation, diarrhea and vomiting. Endocrine: Negative for cold intolerance, polydipsia and polyuria. Genitourinary: Positive for urgency. Negative for dysuria, hematuria and pelvic pain. Musculoskeletal: Positive for arthralgias. Negative for gait problem and joint swelling. Skin: Negative for rash and wound. Allergic/Immunologic: Negative for environmental allergies and food allergies. Neurological: Positive for weakness. Negative for dizziness, tremors, seizures, numbness and headaches. Hematological: Negative for adenopathy. Does not bruise/bleed easily. Psychiatric/Behavioral: Negative for behavioral problems, confusion, dysphoric mood and sleep disturbance. The patient is nervous/anxious. All other systems reviewed and are negative.            Current Outpatient Medications:     triamcinolone (KENALOG) 0.1 % cream, Apply topically 2 times daily. , Disp: 453.6 g, Rfl: 2    Omega-3 Fatty Acids (FISH OIL) 1000 MG CAPS, Take 1,000 mg by mouth daily, Disp: , Rfl:     Cholecalciferol (VITAMIN D3) 5000 units TABS, Take 1 tablet by mouth daily, Disp: , Rfl:     POLICOSANOL PO, Take by mouth, Disp: , Rfl:     Turmeric 500 MG CAPS, Take by mouth, Disp: , Rfl:     MAGNESIUM PO, Take by mouth, Disp: , Rfl:     latanoprost (XALATAN) 0.005 % ophthalmic solution, U 1 GTT IN OU QHS, Disp: , Rfl: 11    Multiple Vitamins-Minerals (THERAPEUTIC MULTIVITAMIN-MINERALS) tablet, Take 1 tablet by mouth daily, Disp: , Rfl:     Coenzyme Q10 (CO Q 10) 100 MG CAPS, Take 100 mg by mouth 2 times daily, Disp: , Rfl:     NONFORMULARY, 2 times daily VISION ESSENCE, Disp: , Rfl:     NONFORMULARY, 2 times daily KYOLIC  FORMULA 041 - TAKES 2 IN AM AND 2 IN PM,, Disp: , Rfl:   Allergies   Allergen Reactions    Claritin [Loratadine] Other (See Comments)     Generalized severe weakness, couldn't move arms or hardly walk    Pcn [Penicillins]     Betamethasone Dipropionate Rash       Past Medical History:   Diagnosis Date    Breast cyst, right     benign    History of fracture of upper extremity     bilateral, fell off a horse and out of a tree    History of ovarian cyst     Hypertension     Osteoarthritis     knee    Renal calculus      Past Surgical History:   Procedure Laterality Date    LITHOTRIPSY Left [id number]    OTHER SURGICAL HISTORY      has acupuncture tx with Dr. Manpreet Howe in [address] [place] [address]       Family History   Problem Relation Age of Onset    Ovarian Cancer Mother     Heart Failure Father     Other Father         AAA    No Known Problems Sister     Alcohol Abuse Brother     Stroke Sister         small    Arthritis Sister         TKR     Social History     Tobacco History     Smoking Status  Never Smoker    Smokeless Tobacco Use  Never Used          Alcohol History     Alcohol Use Status  No Comment  occasional glass of wine          Drug Use     Drug Use Status  No          Sexual Activity     Sexually Active  Not Currently Partners  Male Birth Control/Protection  Post-menopausal              OBJECTIVE  Vitals:    08/25/21 1314   BP: (!) 146/94   Site: Right Upper Arm   Position: Sitting   Pulse: 86   Temp: 97.4 °F (36.3 °C)   TempSrc: Temporal   SpO2: 97%   Weight: 202 lb 9.6 oz (91.9 kg)        Body mass index is 33.71 kg/m². Patient is obese    Orders Placed This Encounter   Procedures    CBC Auto Differential     Standing Status:   Future     Standing Expiration Date:   8/25/2022    Comprehensive Metabolic Panel     Standing Status:   Future     Standing Expiration Date:   8/25/2022    Lipid Panel     Standing Status:   Future     Standing Expiration Date:   8/25/2022     Order Specific Question:   Is Patient Fasting?/# of Hours     Answer:   8    TSH without Reflex     Standing Status:   Future     Standing Expiration Date:   8/25/2022    T4, Free     Standing Status:   Future     Standing Expiration Date:   8/25/2022    T3     Standing Status:   Future     Standing Expiration Date:   8/25/2022    THYROID PEROXIDASE ANTIBODY     Standing Status:   Future     Standing Expiration Date:   8/25/2022    Urinalysis     Standing Status:   Future     Standing Expiration Date:   8/25/2022     Order Specific Question:   SPECIFY(EX-CATH,MIDSTREAM,CYSTO,ETC)? Answer:   midstream    EKG 12 Lead     Standing Status:   Future     Number of Occurrences:   1     Standing Expiration Date:   8/25/2022     Order Specific Question:   Reason for Exam?     Answer:   Hypertension        EXAM   Physical Exam  Vitals and nursing note reviewed. Constitutional:       Appearance: Normal appearance. She is obese. She is not diaphoretic. Cardiovascular:      Rate and Rhythm: Normal rate and regular rhythm. Heart sounds: No murmur heard.      Pulmonary:      Effort: Pulmonary effort is normal.      Breath sounds: Normal breath sounds. No wheezing, rhonchi or rales. Abdominal:      General: Abdomen is flat. Bowel sounds are normal.   Musculoskeletal:         General: Normal range of motion. Right lower leg: Edema present. Left lower leg: Edema present. Comments: OA of knees   Skin:     Findings: Rash present. No bruising. Neurological:      General: No focal deficit present. Mental Status: She is alert and oriented to person, place, and time. Sensory: Sensory deficit present. Motor: No weakness. Coordination: Coordination normal.      Gait: Gait normal.   Psychiatric:         Mood and Affect: Mood normal.         Behavior: Behavior normal.           Diagnoses and all orders for this visit:    Hypothyroidism due to acquired atrophy of thyroid  -     TSH without Reflex; Future  -     T4, Free; Future  -     T3; Future  -     THYROID PEROXIDASE ANTIBODY; Future  -     EKG 12 Lead; Future  -     EKG 12 Lead  Ordered more complete thyroid panel. Should be able to tell for sure if she is hypothryroid. Last TSH from a year ago was 9. Discussed what thyroid dose and why treating is so important. Doubt will make much difference. Will refer to Endocrinologist for 2nd opinion if treatment deemed advisable  Other hyperlipidemia  -     CBC Auto Differential; Future  -     Comprehensive Metabolic Panel; Future  -     Lipid Panel; Future  -     Urinalysis; Future  Was pretty high. No atheroschlerosis identified at 68 can probably avoid statin  Non compliance with medical treatment  Pretty obviously has a thing about doctors and medications  Primary open angle glaucoma (POAG) of both eyes, mild stage  Is taking medications for this  Other orders  -     triamcinolone (KENALOG) 0.1 % cream; Apply topically 2 times daily. Gets hand eczema every year about this time      No follow-ups on file.     Electronically signed by Kristen Rojas MD on 8/25/21 at 1:29 PM EDT    I have personally reviewed and updated the chief complaint, HPI, Past Medical, Family and Social History, as well as the above Review of Systems.

## 2022-02-23 ENCOUNTER — TELEPHONE (OUTPATIENT)
Dept: FAMILY MEDICINE CLINIC | Age: 77
End: 2022-02-23

## 2022-02-23 DIAGNOSIS — Z12.31 ENCOUNTER FOR SCREENING MAMMOGRAM FOR MALIGNANT NEOPLASM OF BREAST: Primary | ICD-10-CM

## 2022-02-23 DIAGNOSIS — Z12.31 ENCOUNTER FOR SCREENING MAMMOGRAM FOR BREAST CANCER: Primary | ICD-10-CM

## 2022-02-23 NOTE — TELEPHONE ENCOUNTER
----- Message from Melyssa Flores sent at 2/23/2022 11:28 AM EST -----  Subject: Message to Provider    QUESTIONS  Information for Provider? Patient is requesting an order for a Mammogram   to go to [facility]. You may send that to her in the mail, Please   and Thank you  ---------------------------------------------------------------------------  --------------  [address]  What is the best way for the office to contact you? OK to leave message on   voicemail  Preferred Call Back Phone Number? [phone]  ---------------------------------------------------------------------------  --------------  SCRIPT ANSWERS  Relationship to Patient?  Self

## 2022-06-24 ENCOUNTER — HOSPITAL ENCOUNTER (INPATIENT)
Age: 77
LOS: 4 days | Discharge: HOME | DRG: 872 | End: 2022-06-28
Attending: EMERGENCY MEDICINE | Admitting: INTERNAL MEDICINE
Payer: MEDICARE

## 2022-06-24 ENCOUNTER — APPOINTMENT (OUTPATIENT)
Dept: CT IMAGING | Age: 77
DRG: 872 | End: 2022-06-24
Payer: MEDICARE

## 2022-06-24 DIAGNOSIS — R11.2 NAUSEA AND VOMITING, INTRACTABILITY OF VOMITING NOT SPECIFIED, UNSPECIFIED VOMITING TYPE: ICD-10-CM

## 2022-06-24 DIAGNOSIS — R10.9 FLANK PAIN: ICD-10-CM

## 2022-06-24 DIAGNOSIS — N13.30 HYDRONEPHROSIS, UNSPECIFIED HYDRONEPHROSIS TYPE: ICD-10-CM

## 2022-06-24 DIAGNOSIS — N20.0 KIDNEY STONE: Primary | ICD-10-CM

## 2022-06-24 PROBLEM — N20.1 URETERAL STONE: Status: ACTIVE | Noted: 2022-06-24

## 2022-06-24 LAB
ALBUMIN SERPL-MCNC: 4.4 G/DL (ref 3.5–5.2)
ALP BLD-CCNC: 68 U/L (ref 35–104)
ALT SERPL-CCNC: 19 U/L (ref 0–32)
ANION GAP SERPL CALCULATED.3IONS-SCNC: 17 MMOL/L (ref 7–16)
AST SERPL-CCNC: 18 U/L (ref 0–31)
ATYPICAL LYMPHOCYTE RELATIVE PERCENT: 0.9 % (ref 0–4)
BACTERIA: ABNORMAL /HPF
BASOPHILS ABSOLUTE: 0 E9/L (ref 0–0.2)
BASOPHILS RELATIVE PERCENT: 0 % (ref 0–2)
BILIRUB SERPL-MCNC: 0.7 MG/DL (ref 0–1.2)
BILIRUBIN URINE: NEGATIVE
BLOOD, URINE: ABNORMAL
BUN BLDV-MCNC: 20 MG/DL (ref 6–23)
CALCIUM SERPL-MCNC: 9.8 MG/DL (ref 8.6–10.2)
CHLORIDE BLD-SCNC: 98 MMOL/L (ref 98–107)
CLARITY: CLEAR
CO2: 21 MMOL/L (ref 22–29)
COLOR: YELLOW
CREAT SERPL-MCNC: 0.8 MG/DL (ref 0.5–1)
EOSINOPHILS ABSOLUTE: 0 E9/L (ref 0.05–0.5)
EOSINOPHILS RELATIVE PERCENT: 0 % (ref 0–6)
EPITHELIAL CELLS, UA: ABNORMAL /HPF
GFR AFRICAN AMERICAN: >60
GFR NON-AFRICAN AMERICAN: >60 ML/MIN/1.73
GLUCOSE BLD-MCNC: 171 MG/DL (ref 74–99)
GLUCOSE URINE: NEGATIVE MG/DL
HCT VFR BLD CALC: 45.7 % (ref 34–48)
HEMOGLOBIN: 15.2 G/DL (ref 11.5–15.5)
KETONES, URINE: NEGATIVE MG/DL
LACTIC ACID: 3.4 MMOL/L (ref 0.5–2.2)
LEUKOCYTE ESTERASE, URINE: ABNORMAL
LYMPHOCYTES ABSOLUTE: 0.35 E9/L (ref 1.5–4)
LYMPHOCYTES RELATIVE PERCENT: 1.7 % (ref 20–42)
MCH RBC QN AUTO: 30.5 PG (ref 26–35)
MCHC RBC AUTO-ENTMCNC: 33.3 % (ref 32–34.5)
MCV RBC AUTO: 91.8 FL (ref 80–99.9)
MONOCYTES ABSOLUTE: 0 E9/L (ref 0.1–0.95)
MONOCYTES RELATIVE PERCENT: 0 % (ref 2–12)
NEUTROPHILS ABSOLUTE: 11.35 E9/L (ref 1.8–7.3)
NEUTROPHILS RELATIVE PERCENT: 97.4 % (ref 43–80)
NITRITE, URINE: POSITIVE
NUCLEATED RED BLOOD CELLS: 0 /100 WBC
PDW BLD-RTO: 12.9 FL (ref 11.5–15)
PH UA: 6 (ref 5–9)
PLATELET # BLD: 242 E9/L (ref 130–450)
PMV BLD AUTO: 9.4 FL (ref 7–12)
POTASSIUM REFLEX MAGNESIUM: 3.9 MMOL/L (ref 3.5–5)
PROTEIN UA: ABNORMAL MG/DL
RBC # BLD: 4.98 E12/L (ref 3.5–5.5)
RBC # BLD: NORMAL 10*6/UL
RBC UA: ABNORMAL /HPF (ref 0–2)
SODIUM BLD-SCNC: 136 MMOL/L (ref 132–146)
SPECIFIC GRAVITY UA: 1.02 (ref 1–1.03)
TOTAL PROTEIN: 7.2 G/DL (ref 6.4–8.3)
UROBILINOGEN, URINE: 0.2 E.U./DL
WBC # BLD: 11.7 E9/L (ref 4.5–11.5)
WBC UA: ABNORMAL /HPF (ref 0–5)

## 2022-06-24 PROCEDURE — 2580000003 HC RX 258

## 2022-06-24 PROCEDURE — 87040 BLOOD CULTURE FOR BACTERIA: CPT

## 2022-06-24 PROCEDURE — 99285 EMERGENCY DEPT VISIT HI MDM: CPT

## 2022-06-24 PROCEDURE — 6360000002 HC RX W HCPCS

## 2022-06-24 PROCEDURE — 6370000000 HC RX 637 (ALT 250 FOR IP)

## 2022-06-24 PROCEDURE — 2060000000 HC ICU INTERMEDIATE R&B

## 2022-06-24 PROCEDURE — 96361 HYDRATE IV INFUSION ADD-ON: CPT

## 2022-06-24 PROCEDURE — 6370000000 HC RX 637 (ALT 250 FOR IP): Performed by: INTERNAL MEDICINE

## 2022-06-24 PROCEDURE — 85025 COMPLETE CBC W/AUTO DIFF WBC: CPT

## 2022-06-24 PROCEDURE — 80053 COMPREHEN METABOLIC PANEL: CPT

## 2022-06-24 PROCEDURE — 87150 DNA/RNA AMPLIFIED PROBE: CPT

## 2022-06-24 PROCEDURE — 96374 THER/PROPH/DIAG INJ IV PUSH: CPT

## 2022-06-24 PROCEDURE — 83605 ASSAY OF LACTIC ACID: CPT

## 2022-06-24 PROCEDURE — 2580000003 HC RX 258: Performed by: INTERNAL MEDICINE

## 2022-06-24 PROCEDURE — 74176 CT ABD & PELVIS W/O CONTRAST: CPT

## 2022-06-24 PROCEDURE — 81001 URINALYSIS AUTO W/SCOPE: CPT

## 2022-06-24 PROCEDURE — 96375 TX/PRO/DX INJ NEW DRUG ADDON: CPT

## 2022-06-24 PROCEDURE — 2580000003 HC RX 258: Performed by: EMERGENCY MEDICINE

## 2022-06-24 PROCEDURE — 87088 URINE BACTERIA CULTURE: CPT

## 2022-06-24 PROCEDURE — 6370000000 HC RX 637 (ALT 250 FOR IP): Performed by: EMERGENCY MEDICINE

## 2022-06-24 PROCEDURE — 36415 COLL VENOUS BLD VENIPUNCTURE: CPT

## 2022-06-24 PROCEDURE — 6360000002 HC RX W HCPCS: Performed by: EMERGENCY MEDICINE

## 2022-06-24 PROCEDURE — 87186 SC STD MICRODIL/AGAR DIL: CPT

## 2022-06-24 RX ORDER — ONDANSETRON 4 MG/1
4 TABLET, ORALLY DISINTEGRATING ORAL EVERY 8 HOURS PRN
Status: DISCONTINUED | OUTPATIENT
Start: 2022-06-24 | End: 2022-06-28 | Stop reason: HOSPADM

## 2022-06-24 RX ORDER — SODIUM CHLORIDE 9 MG/ML
INJECTION, SOLUTION INTRAVENOUS PRN
Status: DISCONTINUED | OUTPATIENT
Start: 2022-06-24 | End: 2022-06-27 | Stop reason: SDUPTHER

## 2022-06-24 RX ORDER — KETOROLAC TROMETHAMINE 30 MG/ML
15 INJECTION, SOLUTION INTRAMUSCULAR; INTRAVENOUS ONCE
Status: COMPLETED | OUTPATIENT
Start: 2022-06-24 | End: 2022-06-24

## 2022-06-24 RX ORDER — POTASSIUM CHLORIDE 7.45 MG/ML
10 INJECTION INTRAVENOUS PRN
Status: DISCONTINUED | OUTPATIENT
Start: 2022-06-24 | End: 2022-06-28 | Stop reason: HOSPADM

## 2022-06-24 RX ORDER — LATANOPROST 50 UG/ML
1 SOLUTION/ DROPS OPHTHALMIC NIGHTLY
Status: DISCONTINUED | OUTPATIENT
Start: 2022-06-24 | End: 2022-06-28 | Stop reason: HOSPADM

## 2022-06-24 RX ORDER — ACETAMINOPHEN 325 MG/1
650 TABLET ORAL ONCE
Status: COMPLETED | OUTPATIENT
Start: 2022-06-24 | End: 2022-06-24

## 2022-06-24 RX ORDER — POTASSIUM CHLORIDE 20 MEQ/1
40 TABLET, EXTENDED RELEASE ORAL PRN
Status: DISCONTINUED | OUTPATIENT
Start: 2022-06-24 | End: 2022-06-28 | Stop reason: HOSPADM

## 2022-06-24 RX ORDER — M-VIT,TX,IRON,MINS/CALC/FOLIC 27MG-0.4MG
1 TABLET ORAL DAILY
Status: DISCONTINUED | OUTPATIENT
Start: 2022-06-25 | End: 2022-06-28 | Stop reason: HOSPADM

## 2022-06-24 RX ORDER — 0.9 % SODIUM CHLORIDE 0.9 %
1000 INTRAVENOUS SOLUTION INTRAVENOUS ONCE
Status: COMPLETED | OUTPATIENT
Start: 2022-06-24 | End: 2022-06-24

## 2022-06-24 RX ORDER — ONDANSETRON 2 MG/ML
4 INJECTION INTRAMUSCULAR; INTRAVENOUS ONCE
Status: COMPLETED | OUTPATIENT
Start: 2022-06-24 | End: 2022-06-24

## 2022-06-24 RX ORDER — ONDANSETRON 2 MG/ML
4 INJECTION INTRAMUSCULAR; INTRAVENOUS EVERY 6 HOURS PRN
Status: DISCONTINUED | OUTPATIENT
Start: 2022-06-24 | End: 2022-06-28 | Stop reason: HOSPADM

## 2022-06-24 RX ORDER — SODIUM CHLORIDE 9 MG/ML
INJECTION, SOLUTION INTRAVENOUS CONTINUOUS
Status: DISCONTINUED | OUTPATIENT
Start: 2022-06-24 | End: 2022-06-27

## 2022-06-24 RX ORDER — SODIUM CHLORIDE 0.9 % (FLUSH) 0.9 %
10 SYRINGE (ML) INJECTION EVERY 12 HOURS SCHEDULED
Status: DISCONTINUED | OUTPATIENT
Start: 2022-06-24 | End: 2022-06-27 | Stop reason: SDUPTHER

## 2022-06-24 RX ORDER — SENNA PLUS 8.6 MG/1
1 TABLET ORAL DAILY PRN
Status: DISCONTINUED | OUTPATIENT
Start: 2022-06-24 | End: 2022-06-28 | Stop reason: HOSPADM

## 2022-06-24 RX ORDER — CEFTRIAXONE 2 G/1
INJECTION, POWDER, FOR SOLUTION INTRAMUSCULAR; INTRAVENOUS
Status: COMPLETED
Start: 2022-06-24 | End: 2022-06-24

## 2022-06-24 RX ORDER — SODIUM CHLORIDE 0.9 % (FLUSH) 0.9 %
10 SYRINGE (ML) INJECTION PRN
Status: DISCONTINUED | OUTPATIENT
Start: 2022-06-24 | End: 2022-06-27 | Stop reason: SDUPTHER

## 2022-06-24 RX ORDER — FENTANYL CITRATE 50 UG/ML
25 INJECTION, SOLUTION INTRAMUSCULAR; INTRAVENOUS ONCE
Status: COMPLETED | OUTPATIENT
Start: 2022-06-24 | End: 2022-06-24

## 2022-06-24 RX ORDER — ACETAMINOPHEN 650 MG/1
650 SUPPOSITORY RECTAL EVERY 6 HOURS PRN
Status: DISCONTINUED | OUTPATIENT
Start: 2022-06-24 | End: 2022-06-28 | Stop reason: HOSPADM

## 2022-06-24 RX ORDER — ENOXAPARIN SODIUM 100 MG/ML
40 INJECTION SUBCUTANEOUS DAILY
Status: DISCONTINUED | OUTPATIENT
Start: 2022-06-25 | End: 2022-06-28 | Stop reason: HOSPADM

## 2022-06-24 RX ORDER — TAMSULOSIN HYDROCHLORIDE 0.4 MG/1
0.4 CAPSULE ORAL ONCE
Status: COMPLETED | OUTPATIENT
Start: 2022-06-24 | End: 2022-06-24

## 2022-06-24 RX ORDER — ACETAMINOPHEN 325 MG/1
650 TABLET ORAL EVERY 6 HOURS PRN
Status: DISCONTINUED | OUTPATIENT
Start: 2022-06-24 | End: 2022-06-28 | Stop reason: HOSPADM

## 2022-06-24 RX ADMIN — KETOROLAC TROMETHAMINE 15 MG: 30 INJECTION, SOLUTION INTRAMUSCULAR; INTRAVENOUS at 19:52

## 2022-06-24 RX ADMIN — SODIUM CHLORIDE 1000 ML: 9 INJECTION, SOLUTION INTRAVENOUS at 19:52

## 2022-06-24 RX ADMIN — ACETAMINOPHEN 650 MG: 325 TABLET ORAL at 18:06

## 2022-06-24 RX ADMIN — CEFTRIAXONE SODIUM: 2 INJECTION, POWDER, FOR SOLUTION INTRAMUSCULAR; INTRAVENOUS at 23:43

## 2022-06-24 RX ADMIN — SODIUM CHLORIDE 1000 ML: 9 INJECTION, SOLUTION INTRAVENOUS at 16:21

## 2022-06-24 RX ADMIN — CEFTRIAXONE 2000 MG: 2 INJECTION, POWDER, FOR SOLUTION INTRAMUSCULAR; INTRAVENOUS at 19:52

## 2022-06-24 RX ADMIN — ACETAMINOPHEN 650 MG: 325 TABLET ORAL at 21:54

## 2022-06-24 RX ADMIN — FENTANYL CITRATE 25 MCG: 50 INJECTION, SOLUTION INTRAMUSCULAR; INTRAVENOUS at 16:22

## 2022-06-24 RX ADMIN — ONDANSETRON 4 MG: 2 INJECTION INTRAMUSCULAR; INTRAVENOUS at 16:22

## 2022-06-24 RX ADMIN — SODIUM CHLORIDE: 9 INJECTION, SOLUTION INTRAVENOUS at 23:40

## 2022-06-24 RX ADMIN — TAMSULOSIN HYDROCHLORIDE 0.4 MG: 0.4 CAPSULE ORAL at 20:41

## 2022-06-24 ASSESSMENT — ENCOUNTER SYMPTOMS
PHOTOPHOBIA: 0
VOMITING: 1
COUGH: 0
BLOOD IN STOOL: 0
ABDOMINAL PAIN: 1
EYE DISCHARGE: 0
TROUBLE SWALLOWING: 0
DIARRHEA: 0
SHORTNESS OF BREATH: 0
WHEEZING: 0
VOICE CHANGE: 0
COLOR CHANGE: 0
NAUSEA: 1

## 2022-06-24 ASSESSMENT — PAIN DESCRIPTION - LOCATION: LOCATION: FLANK

## 2022-06-24 ASSESSMENT — PAIN - FUNCTIONAL ASSESSMENT: PAIN_FUNCTIONAL_ASSESSMENT: 0-10

## 2022-06-24 ASSESSMENT — PAIN DESCRIPTION - ORIENTATION: ORIENTATION: RIGHT

## 2022-06-24 ASSESSMENT — PAIN SCALES - GENERAL: PAINLEVEL_OUTOF10: 10

## 2022-06-24 NOTE — PROGRESS NOTES
Discussed with ER physician. Reviewed vitals, labs, images. Stone appears to be non-obstructing. Air likely secondary to pyelonephritis with gas producing organism. Very mild hydronephrosis appears to be reactive secondary to infection. No obvious obstructing stone. STAT lactate now. Will follow clinically and defer resuscitation plan to ER / medical teams. If becomes clinically unstable will place stent despite no obvious evidence for obstruction. ER to call with lactate once available.

## 2022-06-25 ENCOUNTER — APPOINTMENT (OUTPATIENT)
Dept: GENERAL RADIOLOGY | Age: 77
DRG: 872 | End: 2022-06-25
Payer: MEDICARE

## 2022-06-25 LAB
ACINETOBACTER CALCOAC BAUMANNII COMPLEX BY PCR: NOT DETECTED
ALBUMIN SERPL-MCNC: 3.4 G/DL (ref 3.5–5.2)
ALP BLD-CCNC: 53 U/L (ref 35–104)
ALT SERPL-CCNC: 15 U/L (ref 0–32)
ANION GAP SERPL CALCULATED.3IONS-SCNC: 12 MMOL/L (ref 7–16)
AST SERPL-CCNC: 16 U/L (ref 0–31)
BACTEROIDES FRAGILIS BY PCR: NOT DETECTED
BASOPHILS ABSOLUTE: 0.03 E9/L (ref 0–0.2)
BASOPHILS RELATIVE PERCENT: 0.2 % (ref 0–2)
BILIRUB SERPL-MCNC: 0.4 MG/DL (ref 0–1.2)
BOTTLE TYPE: ABNORMAL
BUN BLDV-MCNC: 22 MG/DL (ref 6–23)
CALCIUM SERPL-MCNC: 8.4 MG/DL (ref 8.6–10.2)
CANDIDA ALBICANS BY PCR: NOT DETECTED
CANDIDA AURIS BY PCR: NOT DETECTED
CANDIDA GLABRATA BY PCR: NOT DETECTED
CANDIDA KRUSEI BY PCR: NOT DETECTED
CANDIDA PARAPSILOSIS BY PCR: NOT DETECTED
CANDIDA TROPICALIS BY PCR: NOT DETECTED
CARBAPENEM RESISTANCE IMP GENE BY PCR: NOT DETECTED
CARBAPENEM RESISTANCE KPC BY PCR: NOT DETECTED
CARBAPENEM RESISTANCE NDM GENE BY PCR: NOT DETECTED
CARBAPENEM RESISTANCE OXA-48 GENE BY PCR: NOT DETECTED
CARBAPENEM RESISTANCE VIM GENE BY PCR: NOT DETECTED
CEPHALOSPORIN RESISTANCE CTX-M GENE BY PCR: NOT DETECTED
CHLORIDE BLD-SCNC: 105 MMOL/L (ref 98–107)
CO2: 22 MMOL/L (ref 22–29)
COLISTIN RESISTANCE MCR-1 GENE BY PCR: NOT DETECTED
CREAT SERPL-MCNC: 1 MG/DL (ref 0.5–1)
CRYPTOCOCCUS NEOFORMANS/GATTII BY PCR: NOT DETECTED
ENTEROBACTER CLOACAE COMPLEX BY PCR: NOT DETECTED
ENTEROBACTERALES BY PCR: DETECTED
ENTEROCOCCUS FAECALIS BY PCR: NOT DETECTED
ENTEROCOCCUS FAECIUM BY PCR: NOT DETECTED
EOSINOPHILS ABSOLUTE: 0 E9/L (ref 0.05–0.5)
EOSINOPHILS RELATIVE PERCENT: 0 % (ref 0–6)
ESCHERICHIA COLI BY PCR: DETECTED
GFR AFRICAN AMERICAN: >60
GFR NON-AFRICAN AMERICAN: 54 ML/MIN/1.73
GLUCOSE BLD-MCNC: 158 MG/DL (ref 74–99)
HAEMOPHILUS INFLUENZAE BY PCR: NOT DETECTED
HCT VFR BLD CALC: 36.9 % (ref 34–48)
HEMOGLOBIN: 12.3 G/DL (ref 11.5–15.5)
IMMATURE GRANULOCYTES #: 0.16 E9/L
IMMATURE GRANULOCYTES %: 0.9 % (ref 0–5)
KLEBSIELLA AEROGENES BY PCR: NOT DETECTED
KLEBSIELLA OXYTOCA BY PCR: NOT DETECTED
KLEBSIELLA PNEUMONIAE GROUP BY PCR: NOT DETECTED
LACTIC ACID: 1.3 MMOL/L (ref 0.5–2.2)
LISTERIA MONOCYTOGENES BY PCR: NOT DETECTED
LYMPHOCYTES ABSOLUTE: 0.42 E9/L (ref 1.5–4)
LYMPHOCYTES RELATIVE PERCENT: 2.4 % (ref 20–42)
MCH RBC QN AUTO: 30.8 PG (ref 26–35)
MCHC RBC AUTO-ENTMCNC: 33.3 % (ref 32–34.5)
MCV RBC AUTO: 92.5 FL (ref 80–99.9)
MONOCYTES ABSOLUTE: 1.09 E9/L (ref 0.1–0.95)
MONOCYTES RELATIVE PERCENT: 6.1 % (ref 2–12)
NEISSERIA MENINGITIDIS BY PCR: NOT DETECTED
NEUTROPHILS ABSOLUTE: 16.15 E9/L (ref 1.8–7.3)
NEUTROPHILS RELATIVE PERCENT: 90.4 % (ref 43–80)
ORDER NUMBER: ABNORMAL
PDW BLD-RTO: 13.2 FL (ref 11.5–15)
PLATELET # BLD: 180 E9/L (ref 130–450)
PMV BLD AUTO: 9.6 FL (ref 7–12)
POTASSIUM REFLEX MAGNESIUM: 4.5 MMOL/L (ref 3.5–5)
PROTEUS SPECIES BY PCR: NOT DETECTED
PSEUDOMONAS AERUGINOSA BY PCR: NOT DETECTED
RBC # BLD: 3.99 E12/L (ref 3.5–5.5)
RBC # BLD: NORMAL 10*6/UL
SALMONELLA SPECIES BY PCR: NOT DETECTED
SERRATIA MARCESCENS BY PCR: NOT DETECTED
SODIUM BLD-SCNC: 139 MMOL/L (ref 132–146)
SOURCE OF BLOOD CULTURE: ABNORMAL
STAPHYLOCOCCUS AUREUS BY PCR: NOT DETECTED
STAPHYLOCOCCUS EPIDERMIDIS BY PCR: NOT DETECTED
STAPHYLOCOCCUS LUGDUNENSIS BY PCR: NOT DETECTED
STAPHYLOCOCCUS SPECIES BY PCR: NOT DETECTED
STENOTROPHOMONAS MALTOPHILIA BY PCR: NOT DETECTED
STREPTOCOCCUS AGALACTIAE BY PCR: NOT DETECTED
STREPTOCOCCUS PNEUMONIAE BY PCR: NOT DETECTED
STREPTOCOCCUS PYOGENES  BY PCR: NOT DETECTED
STREPTOCOCCUS SPECIES BY PCR: NOT DETECTED
TOTAL PROTEIN: 5.8 G/DL (ref 6.4–8.3)
WBC # BLD: 17.9 E9/L (ref 4.5–11.5)

## 2022-06-25 PROCEDURE — 6370000000 HC RX 637 (ALT 250 FOR IP): Performed by: INTERNAL MEDICINE

## 2022-06-25 PROCEDURE — 83605 ASSAY OF LACTIC ACID: CPT

## 2022-06-25 PROCEDURE — 85025 COMPLETE CBC W/AUTO DIFF WBC: CPT

## 2022-06-25 PROCEDURE — 36415 COLL VENOUS BLD VENIPUNCTURE: CPT

## 2022-06-25 PROCEDURE — 74018 RADEX ABDOMEN 1 VIEW: CPT

## 2022-06-25 PROCEDURE — 6360000002 HC RX W HCPCS: Performed by: INTERNAL MEDICINE

## 2022-06-25 PROCEDURE — 2580000003 HC RX 258

## 2022-06-25 PROCEDURE — 2060000000 HC ICU INTERMEDIATE R&B

## 2022-06-25 PROCEDURE — 2580000003 HC RX 258: Performed by: INTERNAL MEDICINE

## 2022-06-25 PROCEDURE — 80053 COMPREHEN METABOLIC PANEL: CPT

## 2022-06-25 RX ADMIN — WATER 1000 MG: 1 INJECTION INTRAMUSCULAR; INTRAVENOUS; SUBCUTANEOUS at 08:01

## 2022-06-25 RX ADMIN — SODIUM CHLORIDE: 9 INJECTION, SOLUTION INTRAVENOUS at 10:10

## 2022-06-25 RX ADMIN — WATER: 1 INJECTION INTRAMUSCULAR; INTRAVENOUS; SUBCUTANEOUS at 00:27

## 2022-06-25 RX ADMIN — MULTIPLE VITAMINS W/ MINERALS TAB 1 TABLET: TAB at 08:00

## 2022-06-25 RX ADMIN — ACETAMINOPHEN 650 MG: 325 TABLET ORAL at 18:36

## 2022-06-25 RX ADMIN — Medication 10 ML: at 01:09

## 2022-06-25 ASSESSMENT — PAIN DESCRIPTION - FREQUENCY: FREQUENCY: INTERMITTENT

## 2022-06-25 ASSESSMENT — PAIN DESCRIPTION - ORIENTATION: ORIENTATION: RIGHT;LEFT

## 2022-06-25 ASSESSMENT — PAIN DESCRIPTION - PAIN TYPE: TYPE: ACUTE PAIN

## 2022-06-25 ASSESSMENT — PAIN DESCRIPTION - DESCRIPTORS: DESCRIPTORS: ACHING;DULL;DISCOMFORT

## 2022-06-25 ASSESSMENT — PAIN DESCRIPTION - ONSET: ONSET: ON-GOING

## 2022-06-25 ASSESSMENT — PAIN - FUNCTIONAL ASSESSMENT: PAIN_FUNCTIONAL_ASSESSMENT: ACTIVITIES ARE NOT PREVENTED

## 2022-06-25 ASSESSMENT — PAIN DESCRIPTION - LOCATION: LOCATION: HEAD

## 2022-06-25 ASSESSMENT — PAIN SCALES - GENERAL
PAINLEVEL_OUTOF10: 3
PAINLEVEL_OUTOF10: 0
PAINLEVEL_OUTOF10: 0

## 2022-06-25 NOTE — PROGRESS NOTES
Spoke with emergency room doctor. Blood pressure remains stable with fluid challenge. Still with tachycardia but clinically appears to be improved since presentation per him. Patient transferred to floor. I spoke with the nurse taking care of her. Stressed the need for her to call should there be any decompensation per her judgment.   Referred her to my orders to place a Nair catheter, keep her n.p.o. and recommended that she receive a IV fluid order from the primary care team.

## 2022-06-25 NOTE — CONSULTS
[address] [place] [address] [facility]    Consultation Note     Admit Date: 6/24/2022  3:31 PM    Reason for Consult:   E. coli septicemia    Attending Physician:  Tricia Yancey DO     Chief Complaint: Flank pain with chills and fever    HISTORY OF PRESENT ILLNESS:   The patient is a 68 y.o. woman not known to the Infectious Diseases service. The patient is admitted to the emergency room. She complains of right flank pain, nausea chills fever and emesis. Patient said this was an acute onset. In 2016 she had been seen by the renal group and had an ESWL bilaterally. Admitting labs show BUN and creatinine 20 and 0.80. White count has gone up to 17.9. LFTs are normal.  CT scan shows a obstructing stone on the right in the renal pelvis and associated gas around the stone. There are also stones on the left but they are nonobstructing. She has right hydronephrosis as well. Patient was evaluated by urology and at this time they did not feel stenting was necessary. Subsequently blood cultures have turned positive for E. coli. Currently she is on ceftriaxone 1 g daily. ID was asked to evaluate.                 Past Medical History:        Diagnosis Date    Breast cyst, right     benign    History of fracture of upper extremity     bilateral, fell off a horse and out of a tree    History of ovarian cyst     Hypertension     Osteoarthritis     knee    Renal calculus      Past Surgical History:        Procedure Laterality Date    LITHOTRIPSY Left [id number]    OTHER SURGICAL HISTORY      has acupuncture tx with Dr. Calhoun Cockayne in [place]. [address]       Current Medications:   Scheduled Meds:   latanoprost  1 drop Both Eyes Nightly    therapeutic multivitamin-minerals  1 tablet Oral Daily    sodium chloride flush  10 mL IntraVENous 2 times per day    enoxaparin  40 mg SubCUTAneous Daily    cefTRIAXone (ROCEPHIN) IV  1,000 mg IntraVENous Q24H     Continuous Infusions:   sodium chloride sodium chloride 100 mL/hr at 06/25/22 1010     PRN Meds:sodium chloride flush, sodium chloride, potassium chloride **OR** potassium alternative oral replacement **OR** potassium chloride, ondansetron **OR** ondansetron, senna, acetaminophen **OR** acetaminophen    Allergies:  Claritin [loratadine], Pcn [penicillins], and Betamethasone dipropionate    Social History:   Social History     Socioeconomic History    Marital status:      Spouse name: Not on file    Number of children: Not on file    Years of education: Not on file    Highest education level: Not on file   Occupational History    Not on file   Tobacco Use    Smoking status: Never Smoker    Smokeless tobacco: Never Used   Vaping Use    Vaping Use: Never used   Substance and Sexual Activity    Alcohol use: No     Comment: occasional glass of wine    Drug use: No    Sexual activity: Not Currently     Partners: Male     Birth control/protection: Post-menopausal   Other Topics Concern    Not on file   Social History Narrative    Not on file     Social Determinants of Health     Financial Resource Strain:     Difficulty of Paying Living Expenses: Not on file   Food Insecurity:     Worried About Running Out of Food in the Last Year: Not on file    Megan of Food in the Last Year: Not on file   Transportation Needs:     Lack of Transportation (Medical): Not on file    Lack of Transportation (Non-Medical):  Not on file   Physical Activity:     Days of Exercise per Week: Not on file    Minutes of Exercise per Session: Not on file   Stress:     Feeling of Stress : Not on file   Social Connections:     Frequency of Communication with Friends and Family: Not on file    Frequency of Social Gatherings with Friends and Family: Not on file    Attends Sikhism Services: Not on file    Active Member of Clubs or Organizations: Not on file    Attends Club or Organization Meetings: Not on file    Marital Status: Not on file   Intimate Partner Violence:     Fear of Current or Ex-Partner: Not on file    Emotionally Abused: Not on file    Physically Abused: Not on file    Sexually Abused: Not on file   Housing Stability:     Unable to Pay for Housing in the Last Year: Not on file    Number of Places Lived in the Last Year: Not on file    Unstable Housing in the Last Year: Not on file       Family History:       Problem Relation Age of Onset    Ovarian Cancer Mother     Heart Failure Father     Other Father         AAA    No Known Problems Sister     Alcohol Abuse Brother     Stroke Sister         small    Arthritis Sister         TKR   . Otherwise non-pertinent to the chief complaint. REVIEW OF SYSTEMS:    CONSTITUTIONAL: Positive chills, positive fevers or night sweats. No loss of weight. EYES:  No double vision or drainage from eyes, ears or throat. HEENT:  No neck stiffness. No dysphagia. No drainage from eyes, ears or throat  RESPIRATORY:  No cough, productive sputum or hemoptysis. CARDIOVASCULAR:  No chest pain, palpitations, orthopnea or dyspnea on exertion. GASTROINTESTINAL: Positive nausea, positive vomiting,   GENITOURINARY:  No frequency burning dysuria or hematuria. Right flank pain  INTEGUMENT/BREAST:  No rash or breast masses. HEMATOLOGIC/LYMPHATIC:  No lymphadenopathy or blood dyscrasics. ALLERGIC/IMMUNOLOGIC:  No anaphylaxis. ENDOCRINE:  No polyuria or polydipsia or temperature intolerance. MUSCULOSKELETAL:  No myalgia or arthralgia. Full ROM. NEUROLOGICAL:  No focal motor sensory deficit. BEHAVIOR/PSYCH:  No psychosis. PHYSICAL EXAM:    Vitals:    /73   Pulse 80   Temp 98.1 °F (36.7 °C) (Axillary)   Resp 16   Ht 5' 5\" (1.651 m)   Wt 212 lb 8 oz (96.4 kg)   SpO2 97%   BMI 35.36 kg/m²   Constitutional: The patient is awake, alert, and oriented. Skin: Warm and dry. No rashes were noted. No jaundice. HEENT: Eyes show round, and reactive pupils. Moist mucous membranes, no ulcerations, no thrush. Neck: Supple to movements.  No lymphadenopathy. Chest: No use of accessory muscles to breathe. Symmetrical expansion. Auscultation reveals no wheezing, crackles, or rhonchi. Cardiovascular: S1 and S2 are rhythmic and regular. No murmurs appreciated. Abdomen: Positive bowel sounds to auscultation. Benign to palpation. No masses felt. No hepatosplenomegaly. Genitourinary: Right flank pain  Extremities: No clubbing, no cyanosis, no edema. Musculoskeletal: Equal and symmetrical  Neurological: No focal  Lines: peripheral      CBC+dif:  Recent Labs     06/24/22  1626 06/24/22  1626 06/25/22  0455   WBC 11.7*  --  17.9*   HGB 15.2   < > 12.3   HCT 45.7   < > 36.9   MCV 91.8   < > 92.5      < > 180   NEUTROABS 11.35*   < > 16.15*    < > = values in this interval not displayed.      No results found for: CRP  No results found for: CRPHS  No results found for: SEDRATE  Lab Results   Component Value Date    ALT 15 06/25/2022    AST 16 06/25/2022    ALKPHOS 53 06/25/2022    BILITOT 0.4 06/25/2022     Lab Results   Component Value Date     06/25/2022    K 4.5 06/25/2022     06/25/2022    CO2 22 06/25/2022    BUN 22 06/25/2022    CREATININE 1.0 06/25/2022    GFRAA >60 06/25/2022    LABGLOM 54 06/25/2022    GLUCOSE 158 06/25/2022    PROT 5.8 06/25/2022    LABALBU 3.4 06/25/2022    CALCIUM 8.4 06/25/2022    BILITOT 0.4 06/25/2022    ALKPHOS 53 06/25/2022    AST 16 06/25/2022    ALT 15 06/25/2022       Lab Results   Component Value Date    PROTIME 9.8 03/12/2016    INR 0.9 03/12/2016       Lab Results   Component Value Date    TSH 9.030 08/26/2020       Lab Results   Component Value Date    NITRITE negative 02/20/2020    COLORU Yellow 06/24/2022    PHUR 6.0 06/24/2022    LABCAST FEW 01/22/2015    WBCUA 10-20 06/24/2022    RBCUA 2-5 06/24/2022    MUCUS Present 01/22/2015    BACTERIA MODERATE 06/24/2022    CLARITYU Clear 06/24/2022    SPECGRAV 1.025 06/24/2022    LEUKOCYTESUR SMALL 06/24/2022    UROBILINOGEN 0.2 06/24/2022    BILIRUBINUR Negative 06/24/2022    BILIRUBINUR negative 02/20/2020    BLOODU MODERATE 06/24/2022    GLUCOSEU Negative 06/24/2022    AMORPHOUS MANY 01/22/2015       No results found for: PVI2EXV, BEART, B4WUWRAP, PHART, THGBART, XWF5EVX, PO2ART, IZQ3DPY  Radiology:  CT ABDOMEN PELVIS WO CONTRAST Additional Contrast? None   Final Result   1. MILD RIGHT HYDRONEPHROSIS with a 12 mm calculus in the renal pelvis. 2. LOCULES OF GAS within the right renal pelvis are of an unclear etiology. Barring any recent surgical intervention to account for the gas, findings are   concerning for INFECTION. 3. Intrarenal calculi in the left kidney. No left-sided hydronephrosis. 4. Prominent sigmoid diverticulosis without diverticulitis. RECOMMENDATIONS:   Unavailable         XR ABDOMEN (KUB) (SINGLE AP VIEW)    (Results Pending)       Microbiology:  Pending  Recent Labs     06/24/22  1753   BC Gram stain performed from blood culture bottle media  Gram negative rods  Previously positive blood culture called  *     No results for input(s): ORG in the last 72 hours. Recent Labs     06/24/22  1753   BLOODCULT2 Gram stain performed from blood culture bottle media  Gram negative rods  *     No results for input(s): STREPNEUMAGU in the last 72 hours. No results for input(s): LP1UAG in the last 72 hours. No results for input(s): ASO in the last 72 hours. No results for input(s): CULTRESP in the last 72 hours. Assessment:  E. coli septicemia associated with renal lithiasis, hydronephrosis of right kidney and a obstructing stone with gas around the in the right kidney    Plan:    Cont ceftriaxone 2 g daily pending sensitivities  Suggest stenting with +bloods and air assoc. stone  Check cultures  D/W urology  Baseline ESR, CRP  Monitor labs  Will follow with you    Thank you for having us see this patient in consultation. I will be discussing this case with the treating physicians.       Electronically signed by Leander Wellington MD on 6/25/2022 at 1:24 PM

## 2022-06-25 NOTE — ED PROVIDER NOTES
68y.o. year old female presenting to the emergency room with concerns of flank pain, chills, emesis, tachycardia beginning today. Patient reports that symptom's onset this afternoon. Worsen with nothing. Improves with nothing. Severity of 10 out of 10 pain, with no radiation. Symptoms are cosntant in timing. Symptoms described as similar to previous episodes of kidney stones. Patient reports  associated symptoms of nausea and vomiting. Chief Complaint   Patient presents with    Flank Pain     right, since after lunch / hx kidney stones     Chills    Emesis    Tachycardia     120s       Review of Systems   Constitutional: Positive for chills, diaphoresis and fever. Negative for fatigue. HENT: Negative for trouble swallowing and voice change. Eyes: Negative for photophobia, discharge and visual disturbance. Respiratory: Negative for cough, shortness of breath and wheezing. Cardiovascular: Negative for chest pain and leg swelling. Gastrointestinal: Positive for abdominal pain, nausea and vomiting. Negative for blood in stool and diarrhea. Genitourinary: Positive for flank pain. Negative for dysuria, frequency, hematuria and urgency. Musculoskeletal: Negative for arthralgias, neck pain and neck stiffness. Skin: Negative for color change, rash and wound. Neurological: Negative for dizziness, syncope, weakness, numbness and headaches. Psychiatric/Behavioral: Negative for behavioral problems and confusion. The patient is nervous/anxious. Physical Exam  Vitals reviewed. Constitutional:       General: She is not in acute distress. Appearance: Normal appearance. She is diaphoretic. HENT:      Head: Normocephalic. Right Ear: External ear normal.      Left Ear: External ear normal.      Nose: Nose normal.      Mouth/Throat:      Pharynx: Oropharynx is clear. Eyes:      General:         Right eye: No discharge. Left eye: No discharge.       Conjunctiva/sclera: Conjunctivae normal.   Cardiovascular:      Rate and Rhythm: Regular rhythm. Tachycardia present. Pulses: Normal pulses. Heart sounds: No friction rub. No gallop. Pulmonary:      Effort: Pulmonary effort is normal. No respiratory distress. Breath sounds: No stridor. No wheezing, rhonchi or rales. Abdominal:      General: There is no distension. Palpations: Abdomen is soft. Tenderness: There is abdominal tenderness. There is right CVA tenderness. There is no left CVA tenderness, guarding or rebound. Musculoskeletal:         General: No tenderness or deformity. Cervical back: Normal range of motion and neck supple. No rigidity or tenderness. Skin:     General: Skin is warm. Coloration: Skin is not jaundiced. Findings: No erythema or lesion. Neurological:      Mental Status: She is alert and oriented to person, place, and time. Sensory: No sensory deficit. Motor: No weakness. Psychiatric:         Mood and Affect: Mood normal.         Behavior: Behavior normal.          Procedures     MDM  Number of Diagnoses or Management Options  Diagnosis management comments: 68year old female presenting to the ER with complaints of flank pain, chills, emesis, tachycardia. UA positive for infection. WBC 11.7. CT demonstrated mild right hydronephrosis with 12 mm calculus in renal pelvis, with locules of gas concerning for infection per radiology. Bun and creatinine within normal limits. Patient started on Rocephin and tylenol . Spoke to Dr. April Smith, discussed patient, lactic ordered will update when resutled. Patient on reevaluation with chills, wrapped in blankets and shivering, temperature 102. 6. toradol ordered. Updated Dr. April Smith and recommended patient be kept NPO and planned to call back with repeat blood pressure and lactic.  Patient on revauluation improved, sitting in bed, no shivering and reporting improvement, feels much better compared to prior in the afternoon. Spoke to Faizan Mayo, discussed patient, will admit at this time. Spoke to Dr Vern Duke, updated regarding blood pressure and patient's reported improvement from prior. Patient stable at time of admission                  --------------------------------------------- PAST HISTORY ---------------------------------------------  Past Medical History:  has a past medical history of Breast cyst, right, History of fracture of upper extremity, History of ovarian cyst, Hypertension, Osteoarthritis, and Renal calculus. Past Surgical History:  has a past surgical history that includes ovarian cyst removal; Lithotripsy (Left, [id number]); and other surgical history. Social History:  reports that she has never smoked. She has never used smokeless tobacco. She reports that she does not drink alcohol and does not use drugs. Family History: family history includes Alcohol Abuse in her brother; Arthritis in her sister; Heart Failure in her father; No Known Problems in her sister; Other in her father; Ovarian Cancer in her mother; Stroke in her sister. The patients home medications have been reviewed.     Allergies: Claritin [loratadine], Pcn [penicillins], and Betamethasone dipropionate    -------------------------------------------------- RESULTS -------------------------------------------------    LABS:  Results for orders placed or performed during the hospital encounter of 06/24/22   CBC with Auto Differential   Result Value Ref Range    WBC 11.7 (H) 4.5 - 11.5 E9/L    RBC 4.98 3.50 - 5.50 E12/L    Hemoglobin 15.2 11.5 - 15.5 g/dL    Hematocrit 45.7 34.0 - 48.0 %    MCV 91.8 80.0 - 99.9 fL    MCH 30.5 26.0 - 35.0 pg    MCHC 33.3 32.0 - 34.5 %    RDW 12.9 11.5 - 15.0 fL    Platelets 856 944 - 276 E9/L    MPV 9.4 7.0 - 12.0 fL    Neutrophils % 97.4 (H) 43.0 - 80.0 %    Lymphocytes % 1.7 (L) 20.0 - 42.0 %    Monocytes % 0.0 (L) 2.0 - 12.0 %    Eosinophils % 0.0 0.0 - 6.0 %    Basophils % 0.0 0.0 - 2.0 % Neutrophils Absolute 11.35 (H) 1.80 - 7.30 E9/L    Lymphocytes Absolute 0.35 (L) 1.50 - 4.00 E9/L    Monocytes Absolute 0.00 (L) 0.10 - 0.95 E9/L    Eosinophils Absolute 0.00 (L) 0.05 - 0.50 E9/L    Basophils Absolute 0.00 0.00 - 0.20 E9/L    Atypical Lymphocytes Relative 0.9 0.0 - 4.0 %    nRBC 0.0 /100 WBC    RBC Morphology Normal    Comprehensive Metabolic Panel w/ Reflex to MG   Result Value Ref Range    Sodium 136 132 - 146 mmol/L    Potassium reflex Magnesium 3.9 3.5 - 5.0 mmol/L    Chloride 98 98 - 107 mmol/L    CO2 21 (L) 22 - 29 mmol/L    Anion Gap 17 (H) 7 - 16 mmol/L    Glucose 171 (H) 74 - 99 mg/dL    BUN 20 6 - 23 mg/dL    CREATININE 0.8 0.5 - 1.0 mg/dL    GFR Non-African American >60 >=60 mL/min/1.73    GFR African American >60     Calcium 9.8 8.6 - 10.2 mg/dL    Total Protein 7.2 6.4 - 8.3 g/dL    Albumin 4.4 3.5 - 5.2 g/dL    Total Bilirubin 0.7 0.0 - 1.2 mg/dL    Alkaline Phosphatase 68 35 - 104 U/L    ALT 19 0 - 32 U/L    AST 18 0 - 31 U/L   Urinalysis with Microscopic   Result Value Ref Range    Color, UA Yellow Straw/Yellow    Clarity, UA Clear Clear    Glucose, Ur Negative Negative mg/dL    Bilirubin Urine Negative Negative    Ketones, Urine Negative Negative mg/dL    Specific Gravity, UA 1.025 1.005 - 1.030    Blood, Urine MODERATE (A) Negative    pH, UA 6.0 5.0 - 9.0    Protein, UA TRACE Negative mg/dL    Urobilinogen, Urine 0.2 <2.0 E.U./dL    Nitrite, Urine POSITIVE (A) Negative    Leukocyte Esterase, Urine SMALL (A) Negative    WBC, UA 10-20 (A) 0 - 5 /HPF    RBC, UA 2-5 0 - 2 /HPF    Epithelial Cells, UA FEW /HPF    Bacteria, UA MODERATE (A) None Seen /HPF   Lactic Acid   Result Value Ref Range    Lactic Acid 3.4 (H) 0.5 - 2.2 mmol/L       RADIOLOGY:  CT ABDOMEN PELVIS WO CONTRAST Additional Contrast? None   Final Result   1. MILD RIGHT HYDRONEPHROSIS with a 12 mm calculus in the renal pelvis. 2. LOCULES OF GAS within the right renal pelvis are of an unclear etiology.    Barring any recent surgical intervention to account for the gas, findings are   concerning for INFECTION. 3. Intrarenal calculi in the left kidney. No left-sided hydronephrosis. 4. Prominent sigmoid diverticulosis without diverticulitis. RECOMMENDATIONS:   Unavailable           ------------------------- NURSING NOTES AND VITALS REVIEWED ---------------------------  Date / Time Roomed:  6/24/2022  3:31 PM  ED Bed Assignment:  [id number]    The nursing notes within the ED encounter and vital signs as below have been reviewed. Patient Vitals for the past 24 hrs:   BP Temp Temp src Pulse Resp SpO2 Height Weight   06/24/22 2042 117/63 99.7 °F (37.6 °C) Oral (!) 119 20 92 % -- --   06/24/22 1959 127/71 (!) 102.6 °F (39.2 °C) Oral (!) 106 18 93 % -- --   06/24/22 1806 (!) 143/79 98.9 °F (37.2 °C) Oral (!) 119 18 94 % -- --   06/24/22 1456 (!) 148/89 100 °F (37.8 °C) Oral (!) 125 20 99 % -- --   06/24/22 1405 -- -- -- (!) 111 22 96 % 5' 5\" (1.651 m) 202 lb (91.6 kg)       Oxygen Saturation Interpretation: Normal    ------------------------------------------ PROGRESS NOTES ------------------------------------------  Re-evaluation(s):   Patients symptoms are improving  Repeat physical examination is improved    Counseling:  I have spoken with the patient and discussed todays results, in addition to providing specific details for the plan of care and counseling regarding the diagnosis and prognosis. Their questions are answered at this time and they are agreeable with the plan of admission.    --------------------------------- ADDITIONAL PROVIDER NOTES ---------------------------------  Consultations:   Spoke with Dr. Reba Mckee. Discussed case. They will admit the patient.   This patient's ED course included: a personal history and physicial examination, re-evaluation prior to disposition, multiple bedside re-evaluations, IV medications, cardiac monitoring and continuous pulse oximetry    This patient has remained hemodynamically stable during their ED course. Diagnosis:  1. Kidney stone    2. Nausea and vomiting, intractability of vomiting not specified, unspecified vomiting type    3. Flank pain    4. Hydronephrosis, unspecified hydronephrosis type        Disposition:  Patient's disposition: Admit  Patient's condition is stable. Attending was present and available throughout encounter including all critical portions;  See Attending Note/Attestation for Final [address], DO  Resident  06/24/22 [id number]

## 2022-06-25 NOTE — CONSULTS
6/25/2022 10:35 AM  Service: Urology  Group: [facility] ([facility])    [facility]  [id number]     Chief Complaint:    12 mm nonobstructing right renal pelvic stone, air in the collecting system, pyelonephritis    History of Present Illness: The patient is a 68 y.o. female patient well known to our practice who presented to ER with severe abdominal/back pain, tachycardia with chills and emesis. She is a pt of Dr. Leeann Lopez last seen in 10/17 and has a Hx of Left ESWL Dr. Allyson Montanez (2016) and Right ESWL Dr. Ron Amos (2016). In the emergency room she was found to have tachycardia, fever and chills. She was given Toradol, IV fluids and responded well to IV fluid challenges. She is sitting comfortably with her  in the room today in no distress whatsoever. She states that she feels \"way better \"today than she did last evening. Past Medical History:   Diagnosis Date    Breast cyst, right     benign    History of fracture of upper extremity     bilateral, fell off a horse and out of a tree    History of ovarian cyst     Hypertension     Osteoarthritis     knee    Renal calculus          Past Surgical History:   Procedure Laterality Date    LITHOTRIPSY Left [id number]    OTHER SURGICAL HISTORY      has acupuncture tx with Dr. Manpreet Howe in [address] [place] [address]         Medications Prior to Admission:    Medications Prior to Admission: triamcinolone (KENALOG) 0.1 % cream, Apply topically 2 times daily.   Omega-3 Fatty Acids (FISH OIL) 1000 MG CAPS, Take 1,000 mg by mouth daily  Cholecalciferol (VITAMIN D3) 5000 units TABS, Take 1 tablet by mouth daily  POLICOSANOL PO, Take by mouth  Turmeric 500 MG CAPS, Take by mouth  MAGNESIUM PO, Take by mouth  latanoprost (XALATAN) 0.005 % ophthalmic solution, U 1 GTT IN OU QHS  Multiple Vitamins-Minerals (THERAPEUTIC MULTIVITAMIN-MINERALS) tablet, Take 1 tablet by mouth daily  Coenzyme Q10 (CO Q 10) 100 MG CAPS, Take 100 mg by mouth 2 times daily  NONFORMULARY, 2 times daily VISION ESSENCE  NONFORMULARY, 2 times daily KYOLIC  FORMULA 606 - TAKES 2 IN AM AND 2 IN PM,    Allergies:    Claritin [loratadine], Pcn [penicillins], and Betamethasone dipropionate    Social History:    reports that she has never smoked. She has never used smokeless tobacco. She reports that she does not drink alcohol and does not use drugs. Family History:   Non-contributory to this Urological problem  family history includes Alcohol Abuse in her brother; Arthritis in her sister; Heart Failure in her father; No Known Problems in her sister; Other in her father; Ovarian Cancer in her mother; Stroke in her sister. Review of Systems:  Constitutional: +fever or chills   Respiratory: negative for cough and hemoptysis  Cardiovascular: negative for chest pain and dyspnea  Gastrointestinal: negative for abdominal pain, diarrhea, nausea and vomiting, resolved flank pain  Derm: negative for rash and skin lesion(s)  Neurological: negative for seizures and tremors  Musculoskeletal: Negative    Psychiatric: Negative   : As above in the HPI, otherwise negative  All other reviews are negative    Physical Exam:     Vitals:  /80   Pulse 81   Temp 96.8 °F (36 °C) (Axillary)   Resp 16   Ht 5' 5\" (1.651 m)   Wt 212 lb 8 oz (96.4 kg)   SpO2 95%   BMI 35.36 kg/m²     General:  Awake, alert, oriented X 3. No apparent distress. HEENT:  Normocephalic, atraumatic. Lungs:  Respirations symmetric and non-labored. Abdomen:  soft, nontender, no masses  Extremities:  No clubbing, cyanosis, or edema  Skin:  Warm and dry, no open lesions or rashes  Neuro: There are no motor or sensory deficits in the 4 quadrant extremities   Rectal: deferred  Genitourinary:   Nair draining yellow urine    Labs:     Lab Results   Component Value Date    WBC 17.9 (H) 06/25/2022    HGB 12.3 06/25/2022    HCT 36.9 06/25/2022    MCV 92.5 06/25/2022     06/25/2022       Lab Results Component Value Date    CREATININE 1.0 06/25/2022         Lab Results   Component Value Date    LABURIN Growth not present 08/18/2016       Lab Results   Component Value Date    BC (A) 06/24/2022     Gram stain performed from blood culture bottle media  Gram negative rods         Lab Results   Component Value Date    BLOODCULT2 (A) 06/24/2022     Gram stain performed from blood culture bottle media  Gram negative rods        Ref. Range 6/24/2022 20:00 6/25/2022 08:15   Lactic Acid Latest Ref Range: 0.5 - 2.2 mmol/L 3.4 (H) 1.3         Imaging:   Impression   1. MILD RIGHT HYDRONEPHROSIS with a 12 mm calculus in the renal pelvis. 2. LOCULES OF GAS within the right renal pelvis are of an unclear etiology. Barring any recent surgical intervention to account for the gas, findings are   concerning for INFECTION. 3. Intrarenal calculi in the left kidney.  No left-sided hydronephrosis. 4. Prominent sigmoid diverticulosis without diverticulitis. Assessment/plan:  12 mm non-obstructing right renal pelvic stone, air in the collecting system secondary to gas producing bacteria  Multiple non-obstructing left renal stones with largest 5 mm  Mild right hydronephrosis  Lactate improving with antibiotics and supportive measures 3.4-->1.3  No azotemia  No fevers since 2200 last night (TMax 102. 6)   Blood Cx + and pending  Tachycardia resolved  BP stable    Discussed pros and cons of stent insertion and as Daisy Cisneros is improving clinically there is no role for this. Continue supportive care. We will check a KUB to assess for calcification of stone for treatment planning. If calcium based stones consider shockwave lithotripsy in the future as an outpatient once infection is entirely resolved.                   Electronically signed by DAHIANA Almonte CNP on 6/25/2022 at 10:35 [facility] [place]

## 2022-06-25 NOTE — PROGRESS NOTES
Dr. Kimberley Alan updated via perfect serve about patient's positive blood cultures. Awaiting callback.

## 2022-06-25 NOTE — H&P
Internal Medicine History & Physical     Chief Complaint: Flank Pain (right, since after lunch / hx kidney stones ), Chills, Emesis, and Tachycardia (120s)  Reason for Admission:  Septic stone  Primary Care Physician: Fred Bush MD  Code status: full    History of Present Illness  Tariq Torres is a 68y.o. year old female who  has a past medical history of Breast cyst, right, History of fracture of upper extremity, History of ovarian cyst, Hypertension, Osteoarthritis, and Renal calculus. .     The patient presented to the ER with sudden severe abdominal/back pain. Patient states that yesterday morning she woke up and she was in her normal state of health she did morning chores ate breakfast had no issues however later after lunch and then she had sudden abdominal pains that she never experienced before and was taken to the emergency room. In the emergency room she was found to have a large renal stone and tachycardia with chills and emesis. Overnight the patient states that she has had no further issues with abdominal pain.   Her reaction to pencillins are moderate     Therapy in ED-   Medications   latanoprost (XALATAN) 0.005 % ophthalmic solution 1 drop (1 drop Both Eyes Not Given 6/25/22 0326)   therapeutic multivitamin-minerals 1 tablet (1 tablet Oral Given 6/25/22 0800)   sodium chloride flush 0.9 % injection 10 mL (10 mLs IntraVENous Not Given 6/25/22 0800)   sodium chloride flush 0.9 % injection 10 mL (has no administration in time range)   0.9 % sodium chloride infusion (has no administration in time range)   potassium chloride (KLOR-CON M) extended release tablet 40 mEq (has no administration in time range)     Or   potassium bicarb-citric acid (EFFER-K) effervescent tablet 40 mEq (has no administration in time range)     Or   potassium chloride 10 mEq/100 mL IVPB (Peripheral Line) (has no administration in time range)   enoxaparin (LOVENOX) injection 40 mg (0 mg SubCUTAneous Held 6/25/22 0802)   ondansetron (ZOFRAN-ODT) disintegrating tablet 4 mg (has no administration in time range)     Or   ondansetron (ZOFRAN) injection 4 mg (has no administration in time range)   senna (SENOKOT) tablet 8.6 mg (has no administration in time range)   acetaminophen (TYLENOL) tablet 650 mg (650 mg Oral Given 6/24/22 2154)     Or   acetaminophen (TYLENOL) suppository 650 mg ( Rectal See Alternative 6/24/22 2154)   cefTRIAXone (ROCEPHIN) 1,000 mg in sterile water 10 mL IV syringe (1,000 mg IntraVENous Given 6/25/22 0801)   0.9 % sodium chloride infusion ( IntraVENous New Bag 6/24/22 2340)   0.9 % sodium chloride bolus (0 mLs IntraVENous Stopped 6/24/22 1754)   fentaNYL (SUBLIMAZE) injection 25 mcg (25 mcg IntraVENous Given 6/24/22 1622)   ondansetron (ZOFRAN) injection 4 mg (4 mg IntraVENous Given 6/24/22 1622)   acetaminophen (TYLENOL) tablet 650 mg (650 mg Oral Given 6/24/22 1806)   cefTRIAXone (ROCEPHIN) 2,000 mg in sterile water 20 mL IV syringe (2,000 mg IntraVENous Given 6/24/22 1952)   cefTRIAXone (ROCEPHIN) 2 g injection (  Given by Other 6/24/22 2343)   sterile water injection (  Given by Other 6/25/22 0027)   ketorolac (TORADOL) injection 15 mg (15 mg IntraVENous Given 6/24/22 1952)   0.9 % sodium chloride bolus (0 mLs IntraVENous Stopped 6/24/22 2239)   tamsulosin (FLOMAX) capsule 0.4 mg (0.4 mg Oral Given 6/24/22 2041)       Past Medical History:   Diagnosis Date    Breast cyst, right     benign    History of fracture of upper extremity     bilateral, fell off a horse and out of a tree    History of ovarian cyst     Hypertension     Osteoarthritis     knee    Renal calculus        Past Surgical History:   Procedure Laterality Date    LITHOTRIPSY Left [id number]    OTHER SURGICAL HISTORY      has acupuncture tx with Dr. Maryann Sam in [address] [place] [address]         Family History  Family History   Problem Relation Age of Onset    Ovarian Cancer Mother     Heart Failure Father     Other Father         AAA    No Known Problems Sister     Alcohol Abuse Brother     Stroke Sister         small    Arthritis Sister         TKR       Social History  Patient lives at home. TOBACCO:   reports that she has never smoked. She has never used smokeless tobacco.  ETOH:   reports no history of alcohol use. Home Medications  Prior to Admission medications    Medication Sig Start Date End Date Taking? Authorizing Provider   triamcinolone (KENALOG) 0.1 % cream Apply topically 2 times daily. 8/25/21   Marlyn Alegria MD   Omega-3 Fatty Acids (FISH OIL) 1000 MG CAPS Take 1,000 mg by mouth daily    Historical Provider, MD   Cholecalciferol (VITAMIN D3) 5000 units TABS Take 1 tablet by mouth daily    Historical Provider, MD   POLICOSANOL PO Take by mouth    Historical Provider, MD   Turmeric 500 MG CAPS Take by mouth    Historical Provider, MD   MAGNESIUM PO Take by mouth    Historical Provider, MD   latanoprost (XALATAN) 0.005 % ophthalmic solution U 1 GTT IN OU QHS 5/29/19   Historical Provider, MD   Multiple Vitamins-Minerals (THERAPEUTIC MULTIVITAMIN-MINERALS) tablet Take 1 tablet by mouth daily    Historical Provider, MD   Coenzyme Q10 (CO Q 10) 100 MG CAPS Take 100 mg by mouth 2 times daily    Historical Provider, MD   NONFORMULARY 2 times daily VISION ESSENCE    Historical Provider, MD   NONFORMULARY 2 times daily KYOLIC  FORMULA 871 - TAKES 2 IN AM AND 2 IN PM,    Historical Provider, MD       Allergies  Allergies   Allergen Reactions    Claritin [Loratadine] Other (See Comments)     Generalized severe weakness, couldn't move arms or hardly walk    Pcn [Penicillins]     Betamethasone Dipropionate Rash       Review of Systems  Please see HPI above. All bolded are positive. All un-bolded are negative.   Gen: fever, chills (last night), fatigue, generalized weakness, diaphoresis, increase in thirst, unintentional weight change, loss of appetite  Head: headache, vision change, hearing loss  Chest: chest pain, chest heaviness, palpitations  Lungs: shortness of breath, wheezing, coughing  Abdomen: abdominal pain, nausea, vomiting,(all last night, none this am) diarrhea, constipation, melena, hematochezia, hematemesis  Back: back pain  Extremities: lower extremity edema, myalgias, arthralgias  Urinary: dysuria, hematuria, or increase in frequency  Neurologic: lightheadedness, dizziness, confusion, syncope, numbness, tingling, weakness  Psychiatric: depression, suicidal ideation, or anxiety    Objective  /80   Pulse 81   Temp 96.8 °F (36 °C) (Axillary)   Resp 16   Ht 5' 5\" (1.651 m)   Wt 212 lb 8 oz (96.4 kg)   SpO2 95%   BMI 35.36 kg/m²     Physical Exam:  General: awake, alert, oriented to person, place, time, and purpose, appears stated age, cooperative, no acute distress, pleasant   Head: normocephalic, atraumatic  Eyes: conjunctivae/corneas clear, sclera non icteric  Mouth: mucous membranes moist, no obvious oral sores  Neck: no JVD, no adenopathy, no carotid bruit, neck is supple, trachea is midline  Back: ROM normal, no CVA tenderness. Lungs: clear to auscultation bilaterally, without rhonchi, crackle, wheezing, or rale, no retractions or use of accessory muscles  Heart: regular rate and regular rhythm, no murmur, normal S1, S2  Abdomen: soft, non-tender; bowel sounds normal; no masses, no organomegaly  Extremities: no lower extremity edema, extremities atraumatic, no cyanosis, no clubbing, 2+ pedal pulses palpated  Skin: normal color, normal texture, normal turgor, no rashes, no lesions  Neurologic:5/5 muscle strength throughout, normal muscle tone throughout, PERRLA, EOMI, face symmetric, hearing intact, tongue midline, speech appropriate without slurring.     Labs-   Lab Results   Component Value Date    WBC 17.9 (H) 06/25/2022    HGB 12.3 06/25/2022    HCT 36.9 06/25/2022     06/25/2022     06/25/2022    K 4.5 06/25/2022     06/25/2022    CREATININE 1.0 06/25/2022    BUN 22 06/25/2022    CO2 22 06/25/2022    GLUCOSE 158 (H) 06/25/2022    ALT 15 06/25/2022    AST 16 06/25/2022    INR 0.9 03/12/2016     No results found for: CKTOTAL, CKMB, CKMBINDEX, TROPONINI  No results for input(s): BNP in the last 72 hours. Recent Radiological Studies:  CT ABDOMEN PELVIS WO CONTRAST Additional Contrast? None    Result Date: 6/24/2022  EXAMINATION: CT OF THE ABDOMEN AND PELVIS WITHOUT CONTRAST 6/24/2022 4:09 pm TECHNIQUE: CT of the abdomen and pelvis was performed without the administration of intravenous contrast. Multiplanar reformatted images are provided for review. Automated exposure control, iterative reconstruction, and/or weight based adjustment of the mA/kV was utilized to reduce the radiation dose to as low as reasonably achievable. COMPARISON: None. HISTORY: ORDERING SYSTEM PROVIDED HISTORY: hx of kidney stone, right flank pain TECHNOLOGIST PROVIDED HISTORY: Reason for exam:->hx of kidney stone, right flank pain Additional Contrast?->None Decision Support Exception - unselect if not a suspected or confirmed emergency medical condition->Emergency Medical Condition (MA) FINDINGS: Lower Chest: Streaky opacities in the lung bases may represent atelectasis and/or scarring. The heart is normal in size. No pleural or pericardial effusion. Organs: Liver: Unremarkable. Gallbladder: Unremarkable. Pancreas: Unremarkable. Spleen:  Unremarkable. Adrenals: Unremarkable. Kidneys: The kidneys are normal in size and contour. Multiple intrarenal calculi are seen. The largest in the right renal pelvis measures approximately 12 mm. Gas is seen within the mildly dilated right renal pelvis. Mild perinephric fat stranding is also seen. Small intrarenal calculi are seen along the lower pole calices of the left kidney, with the largest measuring 5 mm. No ureterolithiasis is seen on either side. GI/Bowel: Prominent diverticulosis in the sigmoid colon. No evidence of acute diverticulitis.   No bowel wall thickening or obstruction. Normal appendix. Pelvis: The urinary bladder is unremarkable. Within limits of the CT technique, the uterus and the adnexa are grossly unremarkable. Peritoneum/Retroperitoneum: Mild calcified atherosclerosis is seen in the aorta. No aneurysm. No lymphadenopathy. No free air or free fluid is seen. Bones/Soft Tissues:  No fracture or joint dislocation. Prominent S-shaped scoliotic curvature of the thoracolumbar spine is seen. 1. MILD RIGHT HYDRONEPHROSIS with a 12 mm calculus in the renal pelvis. 2. LOCULES OF GAS within the right renal pelvis are of an unclear etiology. Barring any recent surgical intervention to account for the gas, findings are concerning for INFECTION. 3. Intrarenal calculi in the left kidney. No left-sided hydronephrosis. 4. Prominent sigmoid diverticulosis without diverticulitis. RECOMMENDATIONS: Unavailable       Assessment/Plan  Patient is a 68 y.o. female who presents with Flank Pain (right, since after lunch / hx kidney stones ), Chills, Emesis, and Tachycardia (120s)    Full problem list includes:  Patient Active Problem List    Diagnosis Date Noted    Ureteral stone 06/24/2022    Non compliance with medical treatment 08/25/2021    Morbidly obese ([facility] [address] 08/25/2020    Primary open angle glaucoma (POAG) 02/20/2020    Hyperglycemia 02/20/2020    Elevated lipoprotein(a) 02/20/2020    Osteoarthritis 08/08/2019    Cellulitis 01/22/2015    Essential hypertension 07/20/2004       · Sepsis from UTI   · Patient with tachycardia, elevated WBC, elevated lactic acid  · IVF  · Blood/Urine culture pending  · Rocephin started  · Unable to tolerate other PCN   · Nephrolithiasis/Right Hydronephrosis  · 12mm in renal pelvis  · Urology consult appreciated  · Routine labs in am  · Lovenox for DVT prophylaxis. · Please see orders for further management and care. Funmi Rondon MD    6/25/2022  8:12 AM    NOTE:  This report was transcribed using voice recognition software.   Every effort was made to ensure accuracy; however, inadvertent computerized transcription errors may be present.

## 2022-06-26 LAB
ALBUMIN SERPL-MCNC: 3.4 G/DL (ref 3.5–5.2)
ALP BLD-CCNC: 61 U/L (ref 35–104)
ALT SERPL-CCNC: 13 U/L (ref 0–32)
ANION GAP SERPL CALCULATED.3IONS-SCNC: 12 MMOL/L (ref 7–16)
AST SERPL-CCNC: 13 U/L (ref 0–31)
BASOPHILS ABSOLUTE: 0.03 E9/L (ref 0–0.2)
BASOPHILS RELATIVE PERCENT: 0.3 % (ref 0–2)
BILIRUB SERPL-MCNC: 0.5 MG/DL (ref 0–1.2)
BUN BLDV-MCNC: 16 MG/DL (ref 6–23)
CALCIUM SERPL-MCNC: 8.5 MG/DL (ref 8.6–10.2)
CHLORIDE BLD-SCNC: 105 MMOL/L (ref 98–107)
CO2: 20 MMOL/L (ref 22–29)
CREAT SERPL-MCNC: 0.8 MG/DL (ref 0.5–1)
EOSINOPHILS ABSOLUTE: 0.07 E9/L (ref 0.05–0.5)
EOSINOPHILS RELATIVE PERCENT: 0.6 % (ref 0–6)
GFR AFRICAN AMERICAN: >60
GFR NON-AFRICAN AMERICAN: >60 ML/MIN/1.73
GLUCOSE BLD-MCNC: 121 MG/DL (ref 74–99)
HCT VFR BLD CALC: 38.1 % (ref 34–48)
HEMOGLOBIN: 12.6 G/DL (ref 11.5–15.5)
IMMATURE GRANULOCYTES #: 0.08 E9/L
IMMATURE GRANULOCYTES %: 0.7 % (ref 0–5)
LYMPHOCYTES ABSOLUTE: 0.59 E9/L (ref 1.5–4)
LYMPHOCYTES RELATIVE PERCENT: 5 % (ref 20–42)
MCH RBC QN AUTO: 30.4 PG (ref 26–35)
MCHC RBC AUTO-ENTMCNC: 33.1 % (ref 32–34.5)
MCV RBC AUTO: 91.8 FL (ref 80–99.9)
MONOCYTES ABSOLUTE: 1.01 E9/L (ref 0.1–0.95)
MONOCYTES RELATIVE PERCENT: 8.6 % (ref 2–12)
NEUTROPHILS ABSOLUTE: 9.97 E9/L (ref 1.8–7.3)
NEUTROPHILS RELATIVE PERCENT: 84.8 % (ref 43–80)
PDW BLD-RTO: 13.2 FL (ref 11.5–15)
PLATELET # BLD: 187 E9/L (ref 130–450)
PMV BLD AUTO: 9.9 FL (ref 7–12)
POTASSIUM REFLEX MAGNESIUM: 3.8 MMOL/L (ref 3.5–5)
RBC # BLD: 4.15 E12/L (ref 3.5–5.5)
RBC # BLD: NORMAL 10*6/UL
SODIUM BLD-SCNC: 137 MMOL/L (ref 132–146)
TOTAL PROTEIN: 6 G/DL (ref 6.4–8.3)
WBC # BLD: 11.8 E9/L (ref 4.5–11.5)

## 2022-06-26 PROCEDURE — 36415 COLL VENOUS BLD VENIPUNCTURE: CPT

## 2022-06-26 PROCEDURE — 6370000000 HC RX 637 (ALT 250 FOR IP): Performed by: INTERNAL MEDICINE

## 2022-06-26 PROCEDURE — 2060000000 HC ICU INTERMEDIATE R&B

## 2022-06-26 PROCEDURE — 6360000002 HC RX W HCPCS: Performed by: INTERNAL MEDICINE

## 2022-06-26 PROCEDURE — 97161 PT EVAL LOW COMPLEX 20 MIN: CPT

## 2022-06-26 PROCEDURE — 85025 COMPLETE CBC W/AUTO DIFF WBC: CPT

## 2022-06-26 PROCEDURE — 80053 COMPREHEN METABOLIC PANEL: CPT

## 2022-06-26 PROCEDURE — 2580000003 HC RX 258: Performed by: INTERNAL MEDICINE

## 2022-06-26 RX ADMIN — SODIUM CHLORIDE: 9 INJECTION, SOLUTION INTRAVENOUS at 17:23

## 2022-06-26 RX ADMIN — MULTIPLE VITAMINS W/ MINERALS TAB 1 TABLET: TAB at 07:47

## 2022-06-26 RX ADMIN — ENOXAPARIN SODIUM 40 MG: 100 INJECTION SUBCUTANEOUS at 07:48

## 2022-06-26 RX ADMIN — WATER 1000 MG: 1 INJECTION INTRAMUSCULAR; INTRAVENOUS; SUBCUTANEOUS at 07:47

## 2022-06-26 RX ADMIN — SODIUM CHLORIDE: 9 INJECTION, SOLUTION INTRAVENOUS at 07:46

## 2022-06-26 ASSESSMENT — PAIN SCALES - GENERAL
PAINLEVEL_OUTOF10: 0
PAINLEVEL_OUTOF10: 4
PAINLEVEL_OUTOF10: 0

## 2022-06-26 ASSESSMENT — PAIN DESCRIPTION - LOCATION: LOCATION: HEAD

## 2022-06-26 ASSESSMENT — PAIN DESCRIPTION - PAIN TYPE: TYPE: ACUTE PAIN

## 2022-06-26 ASSESSMENT — PAIN DESCRIPTION - ONSET: ONSET: ON-GOING

## 2022-06-26 ASSESSMENT — PAIN DESCRIPTION - FREQUENCY: FREQUENCY: INTERMITTENT

## 2022-06-26 ASSESSMENT — PAIN - FUNCTIONAL ASSESSMENT: PAIN_FUNCTIONAL_ASSESSMENT: ACTIVITIES ARE NOT PREVENTED

## 2022-06-26 ASSESSMENT — PAIN DESCRIPTION - ORIENTATION: ORIENTATION: RIGHT;LEFT

## 2022-06-26 NOTE — PROGRESS NOTES
Physical Therapy  Facility/Department: [address] INTERNAL MEDICINE 2  Physical Therapy Initial Assessment    Name: Rose Marie Crawford  : 1945  MRN: [id number]  Date of Service: 2022      Attending Provider:  Rafael Cavanaugh DO    Evaluating PT:  Oliver Schmid, P.T. Room #:  7027/0135-V  Diagnosis:  Ureteral stone [N20.1]  Precautions:  Falls    SUBJECTIVE:    Pt lives with her  in a 2 story home with 3 stairs and 1 rail to enter. There are 12 steps and 1 rail to 2nd floor bed and bath and has a bath on the first floor. Pt ambulated with no AD, but has a cane and ww if needed. OBJECTIVE:   Initial Evaluation  Date: 22 Treatment Short Term/ Long Term   Goals   Was pt agreeable to Eval/treatment? yes     Does pt have pain? No c/o pain     Bed Mobility  NA, pt was found and left sitting up in chair  Independent    Transfers Sit to stand: Independent  Stand to sit: Independent  Stand pivot: Independent  Independent   Ambulation   120 feet with no AD supervision  200 feet with no AD Independent    Stair negotiation: ascended and descended 5 steps with 1 rail SBA  12 steps with 1 rail Independent    AM-PAC 6 Clicks 64/57       BLE ROM is WFL. BLE strength is grossly 4/5. Sensation:  Pt denies numbness and tingling to extremities  Edema:  None noted  Balance: sitting is Independent and standing with no AD is Independent   Endurance: fair+, but had increased mild SOB and c/o fatigue near end of amb that limited her amb distance. ASSESSMENT:    Conditions Requiring Skilled Therapeutic Intervention:    [x]Decreased strength     []Decreased ROM  [x]Decreased functional mobility  []Decreased balance   [x]Decreased endurance   []Decreased posture  []Decreased sensation  []Decreased coordination   []Decreased vision  []Decreased safety awareness   []Increased pain     Comments:  Pt was found up in chair and walked in the [place] with slow gait speed.   She was a little unsteady at times, but had no LOB. She safely ascended/descended stairs. She c/o fatigue and had mild SOB that limited further amb distance at this time. Pt was left sitting up in chair as found with call light left by patient and  and 2 other visitors present in the room. Pt's/ family goals   1. To go home. Patient and or family understand(s) diagnosis, prognosis, and plan of care. PHYSICAL THERAPY PLAN OF CARE:    PT POC is established based on physician order and patient diagnosis     Referring provider/PT Order:  PT eval and treat  Diagnosis:  Ureteral stone [N20.1]  Specific instructions for next treatment:  Increase amb distance as pt is able. Current Treatment Recommendations:     [x] Strengthening to improve independence with functional mobility   [] ROM to improve ROM and decrease spasm and pain which will help promote independence with functional mobility   [x] Balance Training to improve static/dynamic balance and to reduce fall risk  [x] Endurance Training to improve activity tolerance during functional mobility   [x] Transfer Training to improve safety and independence with all functional transfers   [x] Gait Training to improve gait mechanics, endurance and assess need for appropriate assistive device  [x] Stair Training in preparation for safe discharge home and/or into the community   [] Positioning to prevent skin breakdown and contractures  [] Safety and Education Training   [x] Patient/Caregiver Education   [] HEP  [] Other     PT long term treatment goals are located in above grid    Frequency of treatments: 2-5x/week x 1-2 weeks. Time in  11:45  Time out  12:00    Evaluation Time includes thorough review of current medical information, gathering information on past medical history/social history and prior level of function, completion of standardized testing/informal observation of tasks, assessment of data and education on plan of care and goals.     CPT codes:  [x] Low Complexity PT evaluation 83422  [] Moderate Complexity PT evaluation 19145  [] High Complexity PT evaluation [id number]  [] PT Re-evaluation 56867  [] Gait training 20565 ** minutes  [] Manual therapy 77404 ** minutes  [] Therapeutic activities 55766 ** minutes  [] Therapeutic exercises 32786 ** minutes  [] Neuromuscular reeducation 99715 ** minutes     Bala Davis, P.T.   License Number: [id number]

## 2022-06-26 NOTE — PROGRESS NOTES
[facility] PROGRESS NOTE                                                                       6/26/2022        CHIEF UROLOGIC COMPLAINT: Nonobstructing, right renal stone with pyelonephritis    HISTORY OF PRESENT ILLNESS:  Patient without new complaints. Accompanied by her . In great spirits. Feels much better than upon admission. Mild tachycardia.     REVIEW OF SYSTEMS:   CONSTITUTIONAL: negative  HEENT: negative  HEMATOLOGIC: negative  ENDOCRINE: negative  RESPIRATORY: negative  CV: As above  GI: negative  NEURO: negative  ORTHOPEDICS: negative  PSYCHIATRIC: negative  : as above    PAST FAMILY HISTORY:    Family History   Problem Relation Age of Onset    Ovarian Cancer Mother     Heart Failure Father     Other Father         AAA    No Known Problems Sister     Alcohol Abuse Brother     Stroke Sister         small    Arthritis Sister         TKR     PAST SOCIAL HISTORY:    Social History     Socioeconomic History    Marital status:      Spouse name: Not on file    Number of children: Not on file    Years of education: Not on file    Highest education level: Not on file   Occupational History    Not on file   Tobacco Use    Smoking status: Never Smoker    Smokeless tobacco: Never Used   Vaping Use    Vaping Use: Never used   Substance and Sexual Activity    Alcohol use: No     Comment: occasional glass of wine    Drug use: No    Sexual activity: Not Currently     Partners: Male     Birth control/protection: Post-menopausal   Other Topics Concern    Not on file   Social History Narrative    Not on file     Social Determinants of Health     Financial Resource Strain:     Difficulty of Paying Living Expenses: Not on file   Food Insecurity:     Worried About Running Out of Food in the Last Year: Not on file    Megan of Food in the Last Year: Not on file Transportation Needs:     Lack of Transportation (Medical): Not on file    Lack of Transportation (Non-Medical):  Not on file   Physical Activity:     Days of Exercise per Week: Not on file    Minutes of Exercise per Session: Not on file   Stress:     Feeling of Stress : Not on file   Social Connections:     Frequency of Communication with Friends and Family: Not on file    Frequency of Social Gatherings with Friends and Family: Not on file    Attends Quaker Services: Not on file    Active Member of [employer] or Organizations: Not on file    Attends Club or Organization Meetings: Not on file    Marital Status: Not on file   Intimate Partner Violence:     Fear of Current or Ex-Partner: Not on file    Emotionally Abused: Not on file    Physically Abused: Not on file    Sexually Abused: Not on file   Housing Stability:     Unable to Pay for Housing in the Last Year: Not on file    Number of [place] in the Last Year: Not on file    Unstable Housing in the Last Year: Not on file       Scheduled Meds:   latanoprost  1 drop Both Eyes Nightly    therapeutic multivitamin-minerals  1 tablet Oral Daily    sodium chloride flush  10 mL IntraVENous 2 times per day    enoxaparin  40 mg SubCUTAneous Daily    cefTRIAXone (ROCEPHIN) IV  1,000 mg IntraVENous Q24H     Continuous Infusions:   sodium chloride      sodium chloride 100 mL/hr at 06/26/22 0746     PRN Meds:.sodium chloride flush, sodium chloride, potassium chloride **OR** potassium alternative oral replacement **OR** potassium chloride, ondansetron **OR** ondansetron, senna, acetaminophen **OR** acetaminophen    BP (!) 159/87   Pulse (!) 105   Temp 98.3 °F (36.8 °C) (Oral)   Resp 16   Ht 5' 5\" (1.651 m)   Wt 212 lb 8 oz (96.4 kg)   SpO2 95%   BMI 35.36 kg/m²     Lab Results   Component Value Date    WBC 17.9 (H) 06/25/2022    HGB 12.3 06/25/2022    HCT 36.9 06/25/2022    MCV 92.5 06/25/2022     06/25/2022       Lab Results Component Value Date    CREATININE 0.8 06/26/2022         Lab Results   Component Value Date    LABURIN Growth not present 08/18/2016    LABURIN Growth not present 04/14/2016    LABURIN <10,000 CFU/mL  Gram positive organism   (A) 03/12/2016    LABURIN 50,000 CFU/ml 03/12/2016       Lab Results   Component Value Date    BC (A) 06/24/2022     Gram stain performed from blood culture bottle media  Gram negative rods  Previously positive blood culture called         Lab Results   Component Value Date    BLOODCULT2 (A) 06/24/2022     Gram stain performed from blood culture bottle media  Gram negative rods         PHYSICAL EXAMINATION:  Skin dry, without rashes  Respirations non-labored, intact  Abdomen soft, non-tender, non-distended  Alert and oriented x3  Nair draining clear urine      ASSESSMENT AND PLAN:  1. 12 mm non-obstructing right renal pelvic stone, air in the collecting system secondary to gas producing bacteria  Multiple, additional non-obstructing left renal stones with largest 5 mm  Clinically improved with antibiotics and supportive measures 3.4-->1.3  No decline in kidney function  Remains afebrile. Slightly tachycardic again. Blood pressure stable. Positive blood cultures. Discussed with Dr. Jn Rubio yesterday and Dr. Paula Mckenzie today.       Discussed pros and cons of stent insertion and as Sp Mendez is improving clinically there is no role for this. Nair out today. Stone clearly visible on KUB. Will need stent insertion and shockwave lithotripsy in the future.   To have 2 weeks of IV antibiotics and will need a negative urine culture prior to shockwave lithotripsy.      Lucrecia Hernandez MD, M.JO.  6/26/2022  8:20 AM

## 2022-06-26 NOTE — PROGRESS NOTES
[address] [place] [address] [facility]  Progress Note    SUBJECTIVE:  Chief Complaint   Patient presents with    Flank Pain     right, since after lunch / hx kidney stones     Chills    Emesis    Tachycardia     120s     Patient is tolerating medications. No reported adverse drug reactions. No nausea, vomiting, diarrhea. Feeling much better today.  at bedside  No fevers    Review of systems:  As stated above in the chief complaint, otherwise negative. Medications:  Scheduled Meds:   latanoprost  1 drop Both Eyes Nightly    therapeutic multivitamin-minerals  1 tablet Oral Daily    sodium chloride flush  10 mL IntraVENous 2 times per day    enoxaparin  40 mg SubCUTAneous Daily    cefTRIAXone (ROCEPHIN) IV  1,000 mg IntraVENous Q24H     Continuous Infusions:   sodium chloride      sodium chloride 100 mL/hr at 22 0746     PRN Meds:sodium chloride flush, sodium chloride, potassium chloride **OR** potassium alternative oral replacement **OR** potassium chloride, ondansetron **OR** ondansetron, senna, acetaminophen **OR** acetaminophen    OBJECTIVE:  BP (!) 147/96   Pulse (!) 108   Temp 98.2 °F (36.8 °C) (Oral)   Resp 16   Ht 5' 5\" (1.651 m)   Wt 212 lb 8 oz (96.4 kg)   SpO2 98%   BMI 35.36 kg/m²   Temp  Av.1 °F (36.7 °C)  Min: 97.4 °F (36.3 °C)  Max: 98.6 °F (37 °C)  Constitutional: The patient is awake, alert, and oriented.  present   Skin: Warm and dry. No rashes were noted. HEENT: Round and reactive pupils. Moist mucous membranes. No ulcerations or thrush. Neck: Supple to movements. Chest: No use of accessory muscles to breathe. Symmetrical expansion. No wheezing, crackles or rhonchi. Cardiovascular: S1 and S2 are rhythmic and regular. No murmurs appreciated. Abdomen: Positive bowel sounds to auscultation. Benign to palpation. No masses felt. Minimal right CVA tenderness & flank pain  Extremities: No edema.   Lines: peripheral    Laboratory and Tests Review:  Lab Results   Component Value Date    WBC 11.8 (H) 06/26/2022    WBC 17.9 (H) 06/25/2022    WBC 11.7 (H) 06/24/2022    HGB 12.6 06/26/2022    HCT 38.1 06/26/2022    MCV 91.8 06/26/2022     06/26/2022     Lab Results   Component Value Date    NEUTROABS 9.97 (H) 06/26/2022    NEUTROABS 16.15 (H) 06/25/2022    NEUTROABS 11.35 (H) 06/24/2022     No results found for: CRPHS  Lab Results   Component Value Date    ALT 13 06/26/2022    AST 13 06/26/2022    ALKPHOS 61 06/26/2022    BILITOT 0.5 06/26/2022     Lab Results   Component Value Date     06/26/2022    K 3.8 06/26/2022     06/26/2022    CO2 20 06/26/2022    BUN 16 06/26/2022    CREATININE 0.8 06/26/2022    CREATININE 1.0 06/25/2022    CREATININE 0.8 06/24/2022    GFRAA >60 06/26/2022    LABGLOM >60 06/26/2022    GLUCOSE 121 06/26/2022    PROT 6.0 06/26/2022    LABALBU 3.4 06/26/2022    CALCIUM 8.5 06/26/2022    BILITOT 0.5 06/26/2022    ALKPHOS 61 06/26/2022    AST 13 06/26/2022    ALT 13 06/26/2022     No results found for: CRP  No results found for: [address]  Radiology:  Reviewed     Microbiology:   Blood cultures 6/24/2022: E.coli   Urine culture 6/24/2022: GNR    ASSESSMENT:  E. coli septicemia associated with renal lithiasis, hydronephrosis of right kidney and a obstructing stone with gas around the in the right kidney  Leukocytosis, associated to the above, improving    PLAN:  Continue Ceftriaxone 2g daily   picc  Check final cultures -- await sensitivities   Urology following - planning shockwave lithotripsy after 2 weeks of IV antibiotics & with a negative urine culture   Monitor labs- WBC trending down    DAHIANA Callahan - CNP  2:45 PM    Pt seen and examined. Above discussed agree with advanced practice nurse. Labs, cultures, and radiographs reviewed. Face to Face encounter occurred. Changes made as necessary.      Samantha Nelson MD   6/26/2022

## 2022-06-26 NOTE — CARE COORDINATION
CASE MANAGEMENT. Brenda Guerrero Chart reviewed. Noted patient is admitted with sepsis d/t right hydronephrosis. ID on board. Recommends stent and iv antibiotics. Per Urology, patient will need stent and lithotripsy in the future. Plan for 2 weeks of iv atbs. Will need neg urine cx before lithotripsy. Met with patient and her  at the bedside. Mrs Roxana Rae voices being independent at home. Lives in a 2 story and tolerates steps. Does not use any dme, but has cane, walker and shower rails to use as needed. No history of deng/hcc. She is aware of needing iv atbs at [place]. We discussed options. She is not interested in deng. Is agreeable to [facility] and possibly infusion center if appropriate. She is currently on iv rocephin qd. Await ID final input. List of c choices provided. PCP is Dr Az Victoria. Mrs Roxana Rae uses [facility] in [facility]. Will cont to follow along and assist with needs accordingly.

## 2022-06-27 DIAGNOSIS — A41.51 SEPTICEMIA DUE TO E. COLI (HCC): ICD-10-CM

## 2022-06-27 DIAGNOSIS — N13.30 HYDRONEPHROSIS, UNSPECIFIED HYDRONEPHROSIS TYPE: ICD-10-CM

## 2022-06-27 LAB
ALBUMIN SERPL-MCNC: 2.9 G/DL (ref 3.5–5.2)
ALP BLD-CCNC: 54 U/L (ref 35–104)
ALT SERPL-CCNC: 12 U/L (ref 0–32)
ANION GAP SERPL CALCULATED.3IONS-SCNC: 11 MMOL/L (ref 7–16)
AST SERPL-CCNC: 13 U/L (ref 0–31)
BASOPHILS ABSOLUTE: 0.04 E9/L (ref 0–0.2)
BASOPHILS RELATIVE PERCENT: 0.5 % (ref 0–2)
BILIRUB SERPL-MCNC: 0.4 MG/DL (ref 0–1.2)
BLOOD CULTURE, ROUTINE: ABNORMAL
BUN BLDV-MCNC: 11 MG/DL (ref 6–23)
CALCIUM SERPL-MCNC: 8.4 MG/DL (ref 8.6–10.2)
CHLORIDE BLD-SCNC: 107 MMOL/L (ref 98–107)
CO2: 21 MMOL/L (ref 22–29)
CREAT SERPL-MCNC: 0.7 MG/DL (ref 0.5–1)
CULTURE, BLOOD 2: ABNORMAL
EOSINOPHILS ABSOLUTE: 0.09 E9/L (ref 0.05–0.5)
EOSINOPHILS RELATIVE PERCENT: 1.2 % (ref 0–6)
GFR AFRICAN AMERICAN: >60
GFR NON-AFRICAN AMERICAN: >60 ML/MIN/1.73
GLUCOSE BLD-MCNC: 115 MG/DL (ref 74–99)
HCT VFR BLD CALC: 37.5 % (ref 34–48)
HEMOGLOBIN: 12.2 G/DL (ref 11.5–15.5)
IMMATURE GRANULOCYTES #: 0.03 E9/L
IMMATURE GRANULOCYTES %: 0.4 % (ref 0–5)
LYMPHOCYTES ABSOLUTE: 0.79 E9/L (ref 1.5–4)
LYMPHOCYTES RELATIVE PERCENT: 10.2 % (ref 20–42)
MCH RBC QN AUTO: 30.1 PG (ref 26–35)
MCHC RBC AUTO-ENTMCNC: 32.5 % (ref 32–34.5)
MCV RBC AUTO: 92.6 FL (ref 80–99.9)
MONOCYTES ABSOLUTE: 0.79 E9/L (ref 0.1–0.95)
MONOCYTES RELATIVE PERCENT: 10.2 % (ref 2–12)
NEUTROPHILS ABSOLUTE: 6 E9/L (ref 1.8–7.3)
NEUTROPHILS RELATIVE PERCENT: 77.5 % (ref 43–80)
ORGANISM: ABNORMAL
ORGANISM: ABNORMAL
PDW BLD-RTO: 12.9 FL (ref 11.5–15)
PLATELET # BLD: 177 E9/L (ref 130–450)
PMV BLD AUTO: 9.8 FL (ref 7–12)
POTASSIUM REFLEX MAGNESIUM: 3.7 MMOL/L (ref 3.5–5)
RBC # BLD: 4.05 E12/L (ref 3.5–5.5)
SODIUM BLD-SCNC: 139 MMOL/L (ref 132–146)
TOTAL PROTEIN: 5.8 G/DL (ref 6.4–8.3)
WBC # BLD: 7.7 E9/L (ref 4.5–11.5)

## 2022-06-27 PROCEDURE — 2580000003 HC RX 258

## 2022-06-27 PROCEDURE — 6360000002 HC RX W HCPCS: Performed by: INTERNAL MEDICINE

## 2022-06-27 PROCEDURE — 36569 INSJ PICC 5 YR+ W/O IMAGING: CPT

## 2022-06-27 PROCEDURE — 2060000000 HC ICU INTERMEDIATE R&B

## 2022-06-27 PROCEDURE — 80053 COMPREHEN METABOLIC PANEL: CPT

## 2022-06-27 PROCEDURE — 6370000000 HC RX 637 (ALT 250 FOR IP): Performed by: INTERNAL MEDICINE

## 2022-06-27 PROCEDURE — 2500000003 HC RX 250 WO HCPCS

## 2022-06-27 PROCEDURE — 76937 US GUIDE VASCULAR ACCESS: CPT

## 2022-06-27 PROCEDURE — 6360000002 HC RX W HCPCS

## 2022-06-27 PROCEDURE — 85025 COMPLETE CBC W/AUTO DIFF WBC: CPT

## 2022-06-27 PROCEDURE — 02HV33Z INSERTION OF INFUSION DEVICE INTO SUPERIOR VENA CAVA, PERCUTANEOUS APPROACH: ICD-10-PCS | Performed by: INTERNAL MEDICINE

## 2022-06-27 PROCEDURE — 97165 OT EVAL LOW COMPLEX 30 MIN: CPT

## 2022-06-27 PROCEDURE — C1751 CATH, INF, PER/CENT/MIDLINE: HCPCS

## 2022-06-27 PROCEDURE — 2580000003 HC RX 258: Performed by: INTERNAL MEDICINE

## 2022-06-27 PROCEDURE — 36415 COLL VENOUS BLD VENIPUNCTURE: CPT

## 2022-06-27 RX ORDER — SODIUM CHLORIDE 9 MG/ML
INJECTION, SOLUTION INTRAVENOUS PRN
Status: DISCONTINUED | OUTPATIENT
Start: 2022-06-27 | End: 2022-06-28 | Stop reason: HOSPADM

## 2022-06-27 RX ORDER — HEPARIN SODIUM (PORCINE) LOCK FLUSH IV SOLN 100 UNIT/ML 100 UNIT/ML
3 SOLUTION INTRAVENOUS EVERY 12 HOURS SCHEDULED
Status: DISCONTINUED | OUTPATIENT
Start: 2022-06-27 | End: 2022-06-28 | Stop reason: HOSPADM

## 2022-06-27 RX ORDER — LIDOCAINE HYDROCHLORIDE 10 MG/ML
5 INJECTION, SOLUTION EPIDURAL; INFILTRATION; INTRACAUDAL; PERINEURAL ONCE
Status: COMPLETED | OUTPATIENT
Start: 2022-06-27 | End: 2022-06-27

## 2022-06-27 RX ORDER — SODIUM CHLORIDE 0.9 % (FLUSH) 0.9 %
5-40 SYRINGE (ML) INJECTION EVERY 12 HOURS SCHEDULED
Status: DISCONTINUED | OUTPATIENT
Start: 2022-06-27 | End: 2022-06-28 | Stop reason: HOSPADM

## 2022-06-27 RX ORDER — CEFTRIAXONE 1 G/1
2000 INJECTION, POWDER, FOR SOLUTION INTRAMUSCULAR; INTRAVENOUS DAILY
Qty: 10 EACH | Refills: 0 | Status: SHIPPED | OUTPATIENT
Start: 2022-06-27 | End: 2022-07-11

## 2022-06-27 RX ORDER — SODIUM CHLORIDE 0.9 % (FLUSH) 0.9 %
5-40 SYRINGE (ML) INJECTION PRN
Status: CANCELLED | OUTPATIENT
Start: 2022-06-28

## 2022-06-27 RX ORDER — HEPARIN SODIUM (PORCINE) LOCK FLUSH IV SOLN 100 UNIT/ML 100 UNIT/ML
3 SOLUTION INTRAVENOUS PRN
Status: DISCONTINUED | OUTPATIENT
Start: 2022-06-27 | End: 2022-06-28 | Stop reason: HOSPADM

## 2022-06-27 RX ORDER — SODIUM CHLORIDE 0.9 % (FLUSH) 0.9 %
5-40 SYRINGE (ML) INJECTION PRN
Status: DISCONTINUED | OUTPATIENT
Start: 2022-06-27 | End: 2022-06-28 | Stop reason: HOSPADM

## 2022-06-27 RX ADMIN — LIDOCAINE HYDROCHLORIDE 1 ML: 10 SOLUTION INTRAVENOUS at 14:36

## 2022-06-27 RX ADMIN — WATER 1000 MG: 1 INJECTION INTRAMUSCULAR; INTRAVENOUS; SUBCUTANEOUS at 08:15

## 2022-06-27 RX ADMIN — SODIUM CHLORIDE, PRESERVATIVE FREE 10 ML: 5 INJECTION INTRAVENOUS at 20:23

## 2022-06-27 RX ADMIN — MULTIPLE VITAMINS W/ MINERALS TAB 1 TABLET: TAB at 08:15

## 2022-06-27 RX ADMIN — ENOXAPARIN SODIUM 40 MG: 100 INJECTION SUBCUTANEOUS at 08:15

## 2022-06-27 RX ADMIN — HEPARIN SODIUM (PORCINE) LOCK FLUSH IV SOLN 100 UNIT/ML 300 UNITS: 100 SOLUTION at 20:23

## 2022-06-27 ASSESSMENT — PAIN DESCRIPTION - ORIENTATION: ORIENTATION: RIGHT

## 2022-06-27 ASSESSMENT — PAIN DESCRIPTION - PAIN TYPE: TYPE: ACUTE PAIN

## 2022-06-27 ASSESSMENT — PAIN DESCRIPTION - FREQUENCY: FREQUENCY: INTERMITTENT

## 2022-06-27 ASSESSMENT — PAIN SCALES - GENERAL: PAINLEVEL_OUTOF10: 1

## 2022-06-27 ASSESSMENT — PAIN DESCRIPTION - DESCRIPTORS: DESCRIPTORS: DULL;DISCOMFORT

## 2022-06-27 ASSESSMENT — PAIN DESCRIPTION - LOCATION: LOCATION: ABDOMEN

## 2022-06-27 ASSESSMENT — PAIN DESCRIPTION - ONSET: ONSET: ON-GOING

## 2022-06-27 ASSESSMENT — PAIN - FUNCTIONAL ASSESSMENT: PAIN_FUNCTIONAL_ASSESSMENT: ACTIVITIES ARE NOT PREVENTED

## 2022-06-27 NOTE — PROGRESS NOTES
6/27/2022 2:26 PM  Service: Urology  Group: [facility] (John/Lang/Nilesh)    Leanor Bence  [id number]    Subjective:  Feeling ok   No complaints  TMax 99.5  Voiding comfortably       Review of Systems  Constitutional: no chills or sweats  Respiratory: negative for cough and shortness of breath  Cardiovascular: negative for chest pain  Gastrointestinal: No abdominal complaints at present  Dermatologic: negative  Hematologic/lymphatic: negative  Musculoskeletal:negative for back pain  Neurological: negative   Endocrine: negative  Psychiatric: negative  : See above    Scheduled Meds:   lidocaine PF  5 mL IntraDERmal Once    sodium chloride flush  5-40 mL IntraVENous 2 times per day    heparin flush  3 mL IntraVENous 2 times per day    latanoprost  1 drop Both Eyes Nightly    therapeutic multivitamin-minerals  1 tablet Oral Daily    enoxaparin  40 mg SubCUTAneous Daily    cefTRIAXone (ROCEPHIN) IV  1,000 mg IntraVENous Q24H       Objective:  Vitals:    06/27/22 0800   BP: (!) 140/79   Pulse: 79   Resp: 16   Temp: 97.7 °F (36.5 °C)   SpO2: 95%         Allergies: Claritin [loratadine], Pcn [penicillins], and Betamethasone dipropionate    General Appearance: alert and conversing. No acute distress. Skin: no rash or erythema  Head: normocephalic and atraumatic  Pulmonary/Chest: normal air movement, no respiratory distress and no chest wall tenderness  Abdomen: soft, non-tender, non-distended, normal bowel sounds, no masses or organomegaly and no inguinal adenopathy  Genitourinary:negative  Extremities: no cyanosis, clubbing or edema and Damien's sign negative bilaterally  Musculoskeletal: no swollen joints and no trigger point or muscular tenderness      Labs:     Recent Labs     06/27/22  0410      K 3.7      CO2 21*   BUN 11   CREATININE 0.7   GLUCOSE 115*   CALCIUM 8.4*       Results for Hannah Corral (MRN [id number]) as of 6/27/2022 14:29   Ref.  Range 6/27/2022 04:10   WBC Latest Ref Range: 4.5 - 11.5 E9/L 7.7   RBC Latest Ref Range: 3.50 - 5.50 E12/L 4.05   Hemoglobin Quant Latest Ref Range: 11.5 - 15.5 g/dL 12.2   Hematocrit Latest Ref Range: 34.0 - 48.0 % 37.5   MCV Latest Ref Range: 80.0 - 99.9 fL 92.6   MCH Latest Ref Range: 26.0 - 35.0 pg 30.1   MCHC Latest Ref Range: 32.0 - 34.5 % 32.5   MPV Latest Ref Range: 7.0 - 12.0 fL 9.8   RDW Latest Ref Range: 11.5 - 15.0 fL 12.9   Platelet Count Latest Ref Range: 130 - 450 E9/L 177   Neutrophils % Latest Ref Range: 43.0 - 80.0 % 77.5   Immature Granulocytes % Latest Ref Range: 0.0 - 5.0 % 0.4   Lymphocyte % Latest Ref Range: 20.0 - 42.0 % 10.2 (L)   Monocytes % Latest Ref Range: 2.0 - 12.0 % 10.2   Eosinophils % Latest Ref Range: 0.0 - 6.0 % 1.2   Basophils % Latest Ref Range: 0.0 - 2.0 % 0.5   Neutrophils Absolute Latest Ref Range: 1.80 - 7.30 E9/L 6.00   Immature Granulocytes # Latest Units: E9/L 0.03   Lymphocytes Absolute Latest Ref Range: 1.50 - 4.00 E9/L 0.79 (L)   Monocytes Absolute Latest Ref Range: 0.10 - 0.95 E9/L 0.79   Eosinophils Absolute Latest Ref Range: 0.05 - 0.50 E9/L 0.09   Basophils Absolute Latest Ref Range: 0.00 - 0.20 E9/L 0.04       EXAMINATION:   CT OF THE ABDOMEN AND PELVIS WITHOUT CONTRAST 6/24/2022 4:09 pm       TECHNIQUE:   CT of the abdomen and pelvis was performed without the administration of   intravenous contrast. Multiplanar reformatted images are provided for review.    Automated exposure control, iterative reconstruction, and/or weight based   adjustment of the mA/kV was utilized to reduce the radiation dose to as low   as reasonably achievable.       COMPARISON:   None.       HISTORY:   ORDERING SYSTEM PROVIDED HISTORY: hx of kidney stone, right flank pain   TECHNOLOGIST PROVIDED HISTORY:   Reason for exam:->hx of kidney stone, right flank pain   Additional Contrast?->None   Decision Support Exception - unselect if not a suspected or confirmed   emergency medical condition->Emergency Medical Condition (MA)     FINDINGS:   Lower Chest: Streaky opacities in the lung bases may represent atelectasis   and/or scarring.  The heart is normal in size.  No pleural or pericardial   effusion.       Organs:       Liver: Unremarkable.       Gallbladder: Unremarkable.       Pancreas: Unremarkable.       Spleen:  Unremarkable.       Adrenals: Unremarkable.       Kidneys: The kidneys are normal in size and contour.  Multiple intrarenal   calculi are seen.  The largest in the right renal pelvis measures   approximately 12 mm.  Gas is seen within the mildly dilated right renal   pelvis.  Mild perinephric fat stranding is also seen.  Small intrarenal   calculi are seen along the lower pole calices of the left kidney, with the   largest measuring 5 mm.  No ureterolithiasis is seen on either side.       GI/Bowel: Prominent diverticulosis in the sigmoid colon.  No evidence of   acute diverticulitis.  No bowel wall thickening or obstruction.  Normal   appendix.       Pelvis: The urinary bladder is unremarkable.  Within limits of the CT   technique, the uterus and the adnexa are grossly unremarkable.       Peritoneum/Retroperitoneum: Mild calcified atherosclerosis is seen in the   aorta. No aneurysm.  No lymphadenopathy. No free air or free fluid is seen.       Bones/Soft Tissues:  No fracture or joint dislocation.  Prominent S-shaped   scoliotic curvature of the thoracolumbar spine is seen.           Impression   1. MILD RIGHT HYDRONEPHROSIS with a 12 mm calculus in the renal pelvis. 2. LOCULES OF GAS within the right renal pelvis are of an unclear etiology. Barring any recent surgical intervention to account for the gas, findings are   concerning for INFECTION. 3. Intrarenal calculi in the left kidney.  No left-sided hydronephrosis.    4. Prominent sigmoid diverticulosis without diverticulitis.       RECOMMENDATIONS:   Unavailable         6/24/22 8250    Urine Culture, Routine  Abnormal   Growth present, evaluating for:   Gram negative rods   P      Organism Escherichia coli Abnormal  P    Urine Culture, Routine >100,000 CFU/ml   Sensitivity to follow         /24/22 1753    Blood Culture, Routine Previously positive blood culture called Abnormal     Organism Escherichia coli Abnormal     [facility]          Susceptibility      Escherichia coli (1)    Antibiotic Interpretation Microscan  Method Status    amoxicillin-clavulanate Sensitive ^4 mcg/mL BACTERIAL SUSCEPTIBILITY PANEL BY FABIOLA     ceFAZolin Sensitive <=^4 mcg/mL BACTERIAL SUSCEPTIBILITY PANEL BY FABIOLA     cefepime Sensitive <=^0.12 mcg/mL BACTERIAL SUSCEPTIBILITY PANEL BY FABIOLA     cefotaxime Sensitive <=^0.25 mcg/mL BACTERIAL SUSCEPTIBILITY PANEL BY FABIOLA     cefOXitin Sensitive <=^4 mcg/mL BACTERIAL SUSCEPTIBILITY PANEL BY FABIOLA     cefTAZidime-avibactam Sensitive <=^0.12 mcg/mL BACTERIAL SUSCEPTIBILITY PANEL BY FABIOLA     gentamicin Sensitive <=^1 mcg/mL BACTERIAL SUSCEPTIBILITY PANEL BY FABIOLA     levofloxacin Sensitive <=^0.12 mcg/mL BACTERIAL SUSCEPTIBILITY PANEL BY FABIOLA     meropenem Sensitive <=^0.25 mcg/mL BACTERIAL SUSCEPTIBILITY PANEL BY FABIOLA     piperacillin-tazobactam Sensitive <=^4 mcg/mL BACTERIAL SUSCEPTIBILITY PANEL BY FABIOLA     trimethoprim-sulfamethoxazole Sensitive <=^20 mcg/mL BACTERIAL SUSCEPTIBILITY PANEL BY FABIOLA                      Assessment/Plan:  12 mm non obstructing right renal pelvic stone, air in the collecting system secondary to gas producing bacteria  Multiple non-obstructing left renal stones with largest 5 mm  Mild right hydronephrosis  E coli septicemia   ID following, pt on Rocephin   Stones visible on KUB  Plan ESWL in the future once infection is cleared  Will need repeat urine culture prior to procedure  Pt understands plan        Wayne Hansen, DAHIANA - CNP   [facility]  Urology

## 2022-06-27 NOTE — PROGRESS NOTES
[address] [place] [address] [facility]  Progress Note    SUBJECTIVE:  Chief Complaint   Patient presents with    Flank Pain     right, since after lunch / hx kidney stones     Chills    Emesis    Tachycardia     120s     Patient is tolerating medications. No reported adverse drug reactions. No nausea, vomiting, diarrhea. Feeling much better today.  at bedside  No fevers    Review of systems:  As stated above in the chief complaint, otherwise negative. Medications:  Scheduled Meds:   lidocaine PF  5 mL IntraDERmal Once    sodium chloride flush  5-40 mL IntraVENous 2 times per day    heparin flush  3 mL IntraVENous 2 times per day    latanoprost  1 drop Both Eyes Nightly    therapeutic multivitamin-minerals  1 tablet Oral Daily    enoxaparin  40 mg SubCUTAneous Daily    cefTRIAXone (ROCEPHIN) IV  1,000 mg IntraVENous Q24H     Continuous Infusions:   sodium chloride       PRN Meds:sodium chloride flush, sodium chloride, heparin flush, potassium chloride **OR** potassium alternative oral replacement **OR** potassium chloride, ondansetron **OR** ondansetron, senna, acetaminophen **OR** acetaminophen    OBJECTIVE:  BP (!) 140/79   Pulse 79   Temp 97.7 °F (36.5 °C) (Oral)   Resp 16   Ht 5' 5\" (1.651 m)   Wt 212 lb 8 oz (96.4 kg)   SpO2 95%   BMI 35.36 kg/m²   Temp  Av.6 °F (37 °C)  Min: 97.7 °F (36.5 °C)  Max: 99.5 °F (37.5 °C)  Constitutional: The patient is awake, alert, and oriented.  present   Skin: Warm and dry. No rashes were noted. HEENT: Round and reactive pupils. Moist mucous membranes. No ulcerations or thrush. Neck: Supple to movements. Chest: No use of accessory muscles to breathe. Symmetrical expansion. No wheezing, crackles or rhonchi. Cardiovascular: S1 and S2 are rhythmic and regular. No murmurs appreciated. Abdomen: Positive bowel sounds to auscultation. Benign to palpation. No masses felt.  Minimal right CVA tenderness & flank pain  Extremities: No edema.   Lines: peripheral    Laboratory and Tests Review:  Lab Results   Component Value Date    WBC 7.7 06/27/2022    WBC 11.8 (H) 06/26/2022    WBC 17.9 (H) 06/25/2022    HGB 12.2 06/27/2022    HCT 37.5 06/27/2022    MCV 92.6 06/27/2022     06/27/2022     Lab Results   Component Value Date    NEUTROABS 6.00 06/27/2022    NEUTROABS 9.97 (H) 06/26/2022    NEUTROABS 16.15 (H) 06/25/2022     No results found for: CRPHS  Lab Results   Component Value Date    ALT 12 06/27/2022    AST 13 06/27/2022    ALKPHOS 54 06/27/2022    BILITOT 0.4 06/27/2022     Lab Results   Component Value Date     06/27/2022    K 3.7 06/27/2022     06/27/2022    CO2 21 06/27/2022    BUN 11 06/27/2022    CREATININE 0.7 06/27/2022    CREATININE 0.8 06/26/2022    CREATININE 1.0 06/25/2022    GFRAA >60 06/27/2022    LABGLOM >60 06/27/2022    GLUCOSE 115 06/27/2022    PROT 5.8 06/27/2022    LABALBU 2.9 06/27/2022    CALCIUM 8.4 06/27/2022    BILITOT 0.4 06/27/2022    ALKPHOS 54 06/27/2022    AST 13 06/27/2022    ALT 12 06/27/2022     No results found for: CRP  No results found for: [address]  Radiology:  Reviewed     Microbiology:   Blood cultures 6/24/2022: E.coli   Urine culture 6/24/2022: GNR    ASSESSMENT:  · E. coli septicemia associated with renal lithiasis, hydronephrosis of right kidney and a obstructing stone with gas around the in the right kidney  · Leukocytosis, associated to the above, improving    PLAN:  · Continue Ceftriaxone 2g daily-reconcile  · picc  · Check final cultures-E. coli sensitive to everything  · Urology following - planning shockwave lithotripsy after 2 weeks of IV antibiotics & with a negative urine culture   · Monitor labs- WBC trending down    Meribeth Livings, MD  12:06 PM  6/27/2022

## 2022-06-27 NOTE — PROCEDURES
PICC   Catheter insertion date: 6/27/2022     Product Number:  CDC 20822 VPS2   Lot No: [id number]   Gauge: 17   Lumen: single   R Basilic    Vein Diameter: 0.50cm   Arm circumference at insertion site: 34cm   Catheter Length: 48cm   Internal Length: 48cm   External Catheter Length: 0cm   Ultrasound Used: yes  VPS [facility] [place] confirms PICC tip is placed in the lower 1/3 of the SVC or at the Cavoatrial junction. Floor nurse notified PICC is okay to use.    : LILI Small RN

## 2022-06-27 NOTE — PLAN OF CARE
Problem: Discharge Planning  Goal: Discharge to home or other facility with appropriate resources  Outcome: Progressing     Problem: Safety - Adult  Goal: Free from fall injury  Outcome: Progressing  Flowsheets (Taken 6/27/2022 1140)  Free From Fall Injury:   Instruct family/caregiver on patient safety   Based on caregiver fall risk screen, instruct family/caregiver to ask for assistance with transferring infant if caregiver noted to have fall risk factors     Problem: ABCDS Injury Assessment  Goal: Absence of physical injury  Outcome: Progressing

## 2022-06-27 NOTE — PROGRESS NOTES
Subjective:  Patient was seen on the floor earlier this morning at the [address] [place] [address]  Admission details noted  Registered nurse and her  at bedside  No further flank pain  She took a shower this morning  Denies fever or chills  Tolerating medications  Data reviewed in detail with her  Admitted with E. coli sepsis and right ureteral stone    Objective:    BP (!) 140/79   Pulse 79   Temp 97.7 °F (36.5 °C) (Oral)   Resp 16   Ht 5' 5\" (1.651 m)   Wt 212 lb 8 oz (96.4 kg)   SpO2 95%   BMI 35.36 kg/m²   Awake alert oriented  Sitting up in chair comfortably  Oral mucosa moist  Neck supple without adenopathy  Heart:  RRR, no murmurs, gallops, or rubs.   Lungs:  CTA bilaterally, no wheeze, rales or rhonchi  Abd: bowel sounds present, nontender, nondistended, no masses  Extrem:  No clubbing, cyanosis,   1+ peripheral edema noted  Data reviewed in detail    Assessment:  E. coli sepsis  Complicated UTI with right ureteral stone  Volume overload suspected from IVs  Patient Active Problem List   Diagnosis    Cellulitis    Essential hypertension    Osteoarthritis    Primary open angle glaucoma (POAG)    Hyperglycemia    Elevated lipoprotein(a)    Morbidly obese (HCC)    Non compliance with medical treatment    Ureteral stone       Plan:  DC IV fluids  Advance diet and activity as tolerated  Questions answered to her satisfaction  Currently on IV Rocephin  Plan is to do a total of 2 weeks of IV antibiotics followed by lithotripsy provided cultures are negative at that point  She can be discharged with PICC line in place if okay with ID        Heidi Matute MD  10:43 AM  6/27/2022

## 2022-06-27 NOTE — CARE COORDINATION
Continues on Rocephin iv daily. For PICC insertion today. Chose [facility] NELY 935  [phone]- notified of referral- signed script faxed [phone] when pt is discharging. PT am-pac 22- patient is not homebound. Plan home w/ . Will follow .  updated Maryan Payton RN case manager    The Plan for Transition of Care is related to the following treatment goals: [facility] choice for iv abx    The Patient and/or patient representative Mio Pereira was provided with a choice of provider and agrees   with the discharge plan. [x] Yes [] No    Freedom of choice list was provided with basic dialogue that supports the patient's individualized plan of care/goals, treatment preferences and shares the quality data associated with the providers.  [x] Yes [] No

## 2022-06-27 NOTE — PROGRESS NOTES
Occupational Therapy  OCCUPATIONAL THERAPY INITIAL EVALUATION    [facility] [address]  [place] & [place], [place]        Date:2022                                                  Patient Name: Guzman Cordova    MRN: [id number]    : 1945    Room: [address] [place] [address]      Evaluating OT: Ehsan Mariscal, OTR/L [id number]      Referring Provider: Rosita Thomas MD    Specific Provider Orders/Date: OT eval and treat 22      Diagnosis:  Ureteral stone [N20.1]      Pertinent Medical History: HTN, OA      Home Living: Pt lives with  in 2 story house with 3 VIDAL and 1 hand rail . Pt's bedroom is on the 2nd floor and bathroom is on the 1st floor. Bathroom setup: tub/shower with grab bars, elevated commode   Equipment owned: cane, wheeled walker    Prior Level of Function: independent with ADLs , independent with IADLs; functional mobility: no device  Driving: yes    Pain Level: pt did not report pain this date; pt agreeable to therapy  Cognition: A&O: 4/4; WFL command follow demonstrated.  Pt pleasant during session   Memory:  WFL   Sequencing:  WFL   Problem solving:  WFL   Judgement/safety:  WFL     Functional Assessment:  AM-PAC Daily Activity Raw Score: 23/24   Initial Eval Status  Date: 22   Feeding Independent    Grooming Independent  To complete hand hygiene standing at sink   UB Dressing Independent  For gown management   LB Dressing Independent  To don socks   Bathing Independent    Toileting Independent   For clothing management and pericare   Bed Mobility  Pt in chair    Functional Transfers Independent with no device  Sit<>stand from chair  Sit<>stand from commode   Functional Mobility Sup with no device  To and from bathroom and short distance in room   Balance Sitting:     Static:  Independent     Dynamic:independent   Standing: independent    Activity Tolerance Good with light activity   Visual/  Perceptual Glasses: yes            Additional long-term goal: Pt will increase functional independence to PLOF to allow pt to live in least restrictive environment. Hand Dominance R   AROM (PROM) Strength Additional Info:    RUE  WFL 4/5 good  and wfl FMC/dexterity noted during ADL tasks       LUE WFL 4/5 good  and wfl FMC/dexterity noted during ADL tasks       Hearing: WFL   Sensation:  No c/o numbness or tingling   Tone: WFL   Edema: none noted    Comments: Upon arrival patient sitting in chair with  present. At end of session, patient returned to chair with call light and phone within reach, all lines and tubes intact, and  present. Pt able to complete own self care. No acute OT needs at this time. Eval Complexity: Low    Time In: 1015  Time Out: 1027    Min Units   OT Eval Low 97165  x  1   OT Eval Medium 91067      OT Eval High 00664      OT Re-Eval [id number]       Therapeutic Ex 17121       Therapeutic Activities 91543       ADL/Self Care 96375       Orthotic Management 67445       Manual 85815     Neuro Re-Ed 98287       Non-Billable Time          Evaluation Time additionally includes thorough review of current medical information, gathering information on past medical history/social history and prior level of function, interpretation of standardized testing/informal observation of tasks, assessment of data and development of plan of care and goals.             Vilma Frias OTR/L, [id number]

## 2022-06-28 VITALS
BODY MASS INDEX: 35.49 KG/M2 | DIASTOLIC BLOOD PRESSURE: 92 MMHG | RESPIRATION RATE: 18 BRPM | HEART RATE: 87 BPM | HEIGHT: 65 IN | SYSTOLIC BLOOD PRESSURE: 152 MMHG | TEMPERATURE: 98.1 F | OXYGEN SATURATION: 97 % | WEIGHT: 213 LBS

## 2022-06-28 LAB
ALBUMIN SERPL-MCNC: 3.8 G/DL (ref 3.5–5.2)
ALP BLD-CCNC: 64 U/L (ref 35–104)
ALT SERPL-CCNC: 16 U/L (ref 0–32)
ANION GAP SERPL CALCULATED.3IONS-SCNC: 13 MMOL/L (ref 7–16)
AST SERPL-CCNC: 15 U/L (ref 0–31)
BASOPHILS ABSOLUTE: 0.04 E9/L (ref 0–0.2)
BASOPHILS RELATIVE PERCENT: 0.6 % (ref 0–2)
BILIRUB SERPL-MCNC: 0.5 MG/DL (ref 0–1.2)
BUN BLDV-MCNC: 8 MG/DL (ref 6–23)
CALCIUM SERPL-MCNC: 9.4 MG/DL (ref 8.6–10.2)
CHLORIDE BLD-SCNC: 103 MMOL/L (ref 98–107)
CO2: 22 MMOL/L (ref 22–29)
CREAT SERPL-MCNC: 0.7 MG/DL (ref 0.5–1)
EOSINOPHILS ABSOLUTE: 0.16 E9/L (ref 0.05–0.5)
EOSINOPHILS RELATIVE PERCENT: 2.3 % (ref 0–6)
GFR AFRICAN AMERICAN: >60
GFR NON-AFRICAN AMERICAN: >60 ML/MIN/1.73
GLUCOSE BLD-MCNC: 135 MG/DL (ref 74–99)
HCT VFR BLD CALC: 39.2 % (ref 34–48)
HEMOGLOBIN: 13.2 G/DL (ref 11.5–15.5)
IMMATURE GRANULOCYTES #: 0.03 E9/L
IMMATURE GRANULOCYTES %: 0.4 % (ref 0–5)
LYMPHOCYTES ABSOLUTE: 0.82 E9/L (ref 1.5–4)
LYMPHOCYTES RELATIVE PERCENT: 11.5 % (ref 20–42)
MCH RBC QN AUTO: 30.7 PG (ref 26–35)
MCHC RBC AUTO-ENTMCNC: 33.7 % (ref 32–34.5)
MCV RBC AUTO: 91.2 FL (ref 80–99.9)
MONOCYTES ABSOLUTE: 0.82 E9/L (ref 0.1–0.95)
MONOCYTES RELATIVE PERCENT: 11.5 % (ref 2–12)
NEUTROPHILS ABSOLUTE: 5.24 E9/L (ref 1.8–7.3)
NEUTROPHILS RELATIVE PERCENT: 73.7 % (ref 43–80)
ORGANISM: ABNORMAL
PDW BLD-RTO: 12.8 FL (ref 11.5–15)
PLATELET # BLD: 205 E9/L (ref 130–450)
PMV BLD AUTO: 10 FL (ref 7–12)
POTASSIUM REFLEX MAGNESIUM: 3.9 MMOL/L (ref 3.5–5)
RBC # BLD: 4.3 E12/L (ref 3.5–5.5)
REASON FOR REJECTION: NORMAL
REJECTED TEST: NORMAL
SODIUM BLD-SCNC: 138 MMOL/L (ref 132–146)
TOTAL PROTEIN: 6.9 G/DL (ref 6.4–8.3)
URINE CULTURE, ROUTINE: ABNORMAL
WBC # BLD: 7.1 E9/L (ref 4.5–11.5)

## 2022-06-28 PROCEDURE — 36415 COLL VENOUS BLD VENIPUNCTURE: CPT

## 2022-06-28 PROCEDURE — 85025 COMPLETE CBC W/AUTO DIFF WBC: CPT

## 2022-06-28 PROCEDURE — 2580000003 HC RX 258: Performed by: INTERNAL MEDICINE

## 2022-06-28 PROCEDURE — 2580000003 HC RX 258

## 2022-06-28 PROCEDURE — 6360000002 HC RX W HCPCS: Performed by: INTERNAL MEDICINE

## 2022-06-28 PROCEDURE — 6370000000 HC RX 637 (ALT 250 FOR IP): Performed by: INTERNAL MEDICINE

## 2022-06-28 PROCEDURE — 80053 COMPREHEN METABOLIC PANEL: CPT

## 2022-06-28 PROCEDURE — 6360000002 HC RX W HCPCS

## 2022-06-28 RX ORDER — DIPHENHYDRAMINE HYDROCHLORIDE 50 MG/ML
50 INJECTION INTRAMUSCULAR; INTRAVENOUS
Status: CANCELLED | OUTPATIENT
Start: 2022-06-28

## 2022-06-28 RX ORDER — SODIUM CHLORIDE 0.9 % (FLUSH) 0.9 %
5-40 SYRINGE (ML) INJECTION PRN
Status: CANCELLED | OUTPATIENT
Start: 2022-06-29

## 2022-06-28 RX ORDER — EPINEPHRINE 1 MG/ML
0.3 INJECTION, SOLUTION, CONCENTRATE INTRAVENOUS PRN
Status: CANCELLED | OUTPATIENT
Start: 2022-06-29

## 2022-06-28 RX ORDER — HEPARIN SODIUM (PORCINE) LOCK FLUSH IV SOLN 100 UNIT/ML 100 UNIT/ML
300 SOLUTION INTRAVENOUS PRN
Status: CANCELLED | OUTPATIENT
Start: 2022-06-29

## 2022-06-28 RX ORDER — DIPHENHYDRAMINE HCL 25 MG
50 TABLET ORAL
Status: CANCELLED
Start: 2022-06-29

## 2022-06-28 RX ORDER — EPINEPHRINE 1 MG/ML
0.3 INJECTION, SOLUTION, CONCENTRATE INTRAVENOUS PRN
Status: CANCELLED | OUTPATIENT
Start: 2022-06-28

## 2022-06-28 RX ORDER — DIPHENHYDRAMINE HYDROCHLORIDE 50 MG/ML
50 INJECTION INTRAMUSCULAR; INTRAVENOUS
Status: CANCELLED | OUTPATIENT
Start: 2022-06-29

## 2022-06-28 RX ORDER — DIPHENHYDRAMINE HCL 25 MG
50 TABLET ORAL
Status: CANCELLED
Start: 2022-06-28

## 2022-06-28 RX ORDER — SODIUM CHLORIDE 0.9 % (FLUSH) 0.9 %
5-40 SYRINGE (ML) INJECTION PRN
Status: ACTIVE | OUTPATIENT
Start: 2022-06-28 | End: 2022-06-29

## 2022-06-28 RX ADMIN — ENOXAPARIN SODIUM 40 MG: 100 INJECTION SUBCUTANEOUS at 09:29

## 2022-06-28 RX ADMIN — MULTIPLE VITAMINS W/ MINERALS TAB 1 TABLET: TAB at 09:29

## 2022-06-28 RX ADMIN — SODIUM CHLORIDE, PRESERVATIVE FREE 10 ML: 5 INJECTION INTRAVENOUS at 09:28

## 2022-06-28 RX ADMIN — HEPARIN SODIUM (PORCINE) LOCK FLUSH IV SOLN 100 UNIT/ML 300 UNITS: 100 SOLUTION at 09:30

## 2022-06-28 RX ADMIN — WATER 1000 MG: 1 INJECTION INTRAMUSCULAR; INTRAVENOUS; SUBCUTANEOUS at 09:29

## 2022-06-28 ASSESSMENT — PAIN DESCRIPTION - ORIENTATION: ORIENTATION: RIGHT

## 2022-06-28 ASSESSMENT — PAIN DESCRIPTION - DESCRIPTORS: DESCRIPTORS: DULL;DISCOMFORT

## 2022-06-28 ASSESSMENT — PAIN DESCRIPTION - LOCATION: LOCATION: HIP

## 2022-06-28 ASSESSMENT — PAIN SCALES - GENERAL
PAINLEVEL_OUTOF10: 3
PAINLEVEL_OUTOF10: 2

## 2022-06-28 NOTE — PROGRESS NOTES
[address] [place] [address] [facility]  Progress Note    SUBJECTIVE:  Chief Complaint   Patient presents with    Flank Pain     right, since after lunch / hx kidney stones     Chills    Emesis    Tachycardia     120s     Patient is tolerating medications. No reported adverse drug reactions. No nausea, vomiting, diarrhea. Feeling very good  No fevers    Review of systems:  As stated above in the chief complaint, otherwise negative. Medications:  Scheduled Meds:   sodium chloride flush  5-40 mL IntraVENous 2 times per day    heparin flush  3 mL IntraVENous 2 times per day    latanoprost  1 drop Both Eyes Nightly    therapeutic multivitamin-minerals  1 tablet Oral Daily    enoxaparin  40 mg SubCUTAneous Daily    cefTRIAXone (ROCEPHIN) IV  1,000 mg IntraVENous Q24H     Continuous Infusions:   sodium chloride       PRN Meds:sodium chloride flush, sodium chloride, heparin flush, potassium chloride **OR** potassium alternative oral replacement **OR** potassium chloride, ondansetron **OR** ondansetron, senna, acetaminophen **OR** acetaminophen    OBJECTIVE:  BP (!) 152/92   Pulse 87   Temp 98.1 °F (36.7 °C) (Oral)   Resp 18   Ht 5' 5\" (1.651 m)   Wt 213 lb (96.6 kg)   SpO2 97%   BMI 35.45 kg/m²   Temp  Av.3 °F (36.8 °C)  Min: 98.1 °F (36.7 °C)  Max: 98.5 °F (36.9 °C)  Constitutional: The patient is awake, alert, and oriented.  present   Skin: Warm and dry. No rashes were noted. HEENT: Round and reactive pupils. Moist mucous membranes. No ulcerations or thrush. Neck: Supple to movements. Chest: No use of accessory muscles to breathe. Symmetrical expansion. No wheezing, crackles or rhonchi. Cardiovascular: S1 and S2 are rhythmic and regular. No murmurs appreciated. Abdomen: Positive bowel sounds to auscultation. Benign to palpation. No masses felt. Minimal right CVA tenderness & flank pain  Extremities: No edema.   Lines: peripheral    Laboratory and Tests Review:  Lab Results   Component Value Date    WBC 7.1 06/28/2022    WBC 7.7 06/27/2022    WBC 11.8 (H) 06/26/2022    HGB 13.2 06/28/2022    HCT 39.2 06/28/2022    MCV 91.2 06/28/2022     06/28/2022     Lab Results   Component Value Date    NEUTROABS 5.24 06/28/2022    NEUTROABS 6.00 06/27/2022    NEUTROABS 9.97 (H) 06/26/2022     No results found for: CRPHS  Lab Results   Component Value Date    ALT 16 06/28/2022    AST 15 06/28/2022    ALKPHOS 64 06/28/2022    BILITOT 0.5 06/28/2022     Lab Results   Component Value Date     06/28/2022    K 3.9 06/28/2022     06/28/2022    CO2 22 06/28/2022    BUN 8 06/28/2022    CREATININE 0.7 06/28/2022    CREATININE 0.7 06/27/2022    CREATININE 0.8 06/26/2022    GFRAA >60 06/28/2022    LABGLOM >60 06/28/2022    GLUCOSE 135 06/28/2022    PROT 6.9 06/28/2022    LABALBU 3.8 06/28/2022    CALCIUM 9.4 06/28/2022    BILITOT 0.5 06/28/2022    ALKPHOS 64 06/28/2022    AST 15 06/28/2022    ALT 16 06/28/2022     No results found for: CRP  No results found for: Camillo Cogan  Radiology:  Reviewed     Microbiology:   Blood cultures 6/24/2022: E.coli   Urine culture 6/24/2022: GNR    ASSESSMENT:  · E. coli septicemia associated with renal lithiasis, hydronephrosis of right kidney and a obstructing stone with gas around the in the right kidney  · Leukocytosis, associated to the above, improving    PLAN:  · Continue Ceftriaxone 2g daily-reconcile  · picc  · Check final cultures-E. coli sensitive to everything  · Urology following - planning shockwave lithotripsy after 2 weeks of IV antibiotics & with a negative urine culture   · Monitor labs- WBC trending down    Olivia Anaya MD  2:58 PM  6/28/2022

## 2022-06-28 NOTE — PROGRESS NOTES
Subjective:  Patient was seen on the floor earlier this morning at the [address]  Admission details noted  No further flank pain  She took a shower this morning  Denies fever or chills  Tolerating medications  Data reviewed in detail with her  Admitted with E. coli sepsis and right ureteral stone    Objective:    BP (!) 152/92   Pulse 87   Temp 98.1 °F (36.7 °C) (Oral)   Resp 18   Ht 5' 5\" (1.651 m)   Wt 213 lb (96.6 kg)   SpO2 97%   BMI 35.45 kg/m²   Awake alert oriented  Sitting up in chair comfortably  Oral mucosa moist  Neck supple without adenopathy  Heart:  RRR, no murmurs, gallops, or rubs.   Lungs:  CTA bilaterally, no wheeze, rales or rhonchi  Abd: bowel sounds present, nontender, nondistended, no masses  Extrem:  No clubbing, cyanosis,   1+ peripheral edema noted  Data reviewed in detail    Assessment:  E. coli sepsis  Complicated UTI with right ureteral stone  Volume overload suspected from IVs  Patient Active Problem List   Diagnosis    Cellulitis    Essential hypertension    Osteoarthritis    Primary open angle glaucoma (POAG)    Hyperglycemia    Elevated lipoprotein(a)    Morbidly obese (HCC)    Non compliance with medical treatment    Ureteral stone    Hydronephrosis    Septicemia due to E. coli ([facility] [address]       Plan:  DC IV fluids  Advance diet and activity as tolerated  Questions answered to her satisfaction  Currently on IV Rocephin  Plan is to do a total of 2 weeks of IV antibiotics followed by lithotripsy provided cultures are negative at that point  She can be discharged with PICC line in place if okay with ID        Herve Lee MD  11:12 AM  6/28/2022

## 2022-06-28 NOTE — CARE COORDINATION
PICC inserted 6/27. Continues on Rocephin iv daily. Plan remains to go to [address] [phone] on discharge- notify when pt is discharging- scheduled for 6/29 @ 0745am- pt notified. Plan home w/ . Will follow.   updated Dayday Perkins RN case manager

## 2022-06-29 ENCOUNTER — TELEPHONE (OUTPATIENT)
Dept: FAMILY MEDICINE CLINIC | Age: 77
End: 2022-06-29

## 2022-06-29 ENCOUNTER — HOSPITAL ENCOUNTER (OUTPATIENT)
Dept: INFUSION THERAPY | Age: 77
Setting detail: INFUSION SERIES
Discharge: HOME | End: 2022-06-29
Payer: MEDICARE

## 2022-06-29 VITALS
TEMPERATURE: 98.1 F | OXYGEN SATURATION: 96 % | HEART RATE: 109 BPM | RESPIRATION RATE: 18 BRPM | SYSTOLIC BLOOD PRESSURE: 144 MMHG | DIASTOLIC BLOOD PRESSURE: 96 MMHG

## 2022-06-29 DIAGNOSIS — A41.51 SEPTICEMIA DUE TO E. COLI (HCC): ICD-10-CM

## 2022-06-29 DIAGNOSIS — N13.30 HYDRONEPHROSIS, UNSPECIFIED HYDRONEPHROSIS TYPE: Primary | ICD-10-CM

## 2022-06-29 PROCEDURE — 6360000002 HC RX W HCPCS: Performed by: SPECIALIST

## 2022-06-29 PROCEDURE — 2580000003 HC RX 258: Performed by: SPECIALIST

## 2022-06-29 PROCEDURE — 96374 THER/PROPH/DIAG INJ IV PUSH: CPT

## 2022-06-29 RX ORDER — SODIUM CHLORIDE 0.9 % (FLUSH) 0.9 %
5-40 SYRINGE (ML) INJECTION PRN
Status: DISCONTINUED | OUTPATIENT
Start: 2022-06-29 | End: 2022-06-30 | Stop reason: HOSPADM

## 2022-06-29 RX ORDER — HEPARIN SODIUM (PORCINE) LOCK FLUSH IV SOLN 100 UNIT/ML 100 UNIT/ML
300 SOLUTION INTRAVENOUS PRN
Status: DISCONTINUED | OUTPATIENT
Start: 2022-06-29 | End: 2022-06-30 | Stop reason: HOSPADM

## 2022-06-29 RX ADMIN — HEPARIN 300 UNITS: 100 SYRINGE at 08:27

## 2022-06-29 RX ADMIN — SODIUM CHLORIDE, PRESERVATIVE FREE 10 ML: 5 INJECTION INTRAVENOUS at 08:26

## 2022-06-29 RX ADMIN — WATER 2000 MG: 1 INJECTION INTRAMUSCULAR; INTRAVENOUS; SUBCUTANEOUS at 08:20

## 2022-06-29 RX ADMIN — SODIUM CHLORIDE, PRESERVATIVE FREE 10 ML: 5 INJECTION INTRAVENOUS at 08:19

## 2022-06-29 ASSESSMENT — PAIN DESCRIPTION - LOCATION: LOCATION: HIP

## 2022-06-29 ASSESSMENT — PAIN DESCRIPTION - FREQUENCY: FREQUENCY: INTERMITTENT

## 2022-06-29 ASSESSMENT — PAIN - FUNCTIONAL ASSESSMENT: PAIN_FUNCTIONAL_ASSESSMENT: ACTIVITIES ARE NOT PREVENTED

## 2022-06-29 ASSESSMENT — PAIN DESCRIPTION - ORIENTATION: ORIENTATION: RIGHT

## 2022-06-29 ASSESSMENT — PAIN DESCRIPTION - PAIN TYPE: TYPE: ACUTE PAIN

## 2022-06-29 ASSESSMENT — PAIN DESCRIPTION - DESCRIPTORS: DESCRIPTORS: DULL;DISCOMFORT

## 2022-06-29 ASSESSMENT — PAIN SCALES - GENERAL: PAINLEVEL_OUTOF10: 1

## 2022-06-29 NOTE — PROGRESS NOTES
Patient instructed on s/s infection/complication with PICC line to report and instructed on keeping PICC dressing clean, dry and intact patient verbalizes understanding. Tolerated infusion well. Reviewed therapy plan, offered education material and/or discharge material, reviewed medication information and signs and symptoms  and educated on possible side effects, verbalizes good knowledge of current plan patient verbalizes understanding, and has no signs or symptoms to report at this time. Patient discharged. Patient alert and oriented x3. No distress noted. Vital signs stable. Patient denies any new or worsening pain. Patient denies any needs. All questions answered. Next appointment scheduled. declines copy of AVS. Waited after infusion since she thought it was her first dose. Asymptomatic when left.  Given adenike comp hand out

## 2022-06-29 NOTE — TELEPHONE ENCOUNTER
[facility] Initial Follow Up Call    Outreach made within 2 business days of discharge: Yes    Patient: Yumiko Miguel Patient : 1945   MRN: [id number]  Reason for Admission: Ureteral stone  Discharge Date: 22       Spoke with: Left message to return call back.     Discharge department/facility: [facility]    Scheduled appointment with PCP within 7-14 days    Follow Up  Future Appointments   Date Time Provider Toño Castillo   2022 11:00 AM [facility] RM 6 [facility]   2022 11:30 AM [facility] RM 7 [facility]   2022 11:30 AM [facility] RM 3 [facility]   7/3/2022 11:30 AM [facility] RM 4 [facility]   2022 11:30 AM [facility] RM 6 [facility]   2022 11:00 AM [facility] RM 4 [facility]   2022 11:00 AM [facility] RM 5 [facility]   2022 11:30 AM [facility] RM 8 [facility]   2022 11:30 AM [facility] RM 8 [facility]   2022 11:00 AM [facility] RM 5 [facility]   7/10/2022 11:00 AM [facility] RM 5 [facility]   2022 11:30 AM [facility] RM 2 [facility]   2022  1:30 PM DAHIANA Bellamy - [facility]   2022 11:30 AM [facility] RM 6 [facility]   2022  1:15 PM Malissa Haji MD [facility]       Liz Romero

## 2022-06-30 ENCOUNTER — TELEPHONE (OUTPATIENT)
Dept: FAMILY MEDICINE CLINIC | Age: 77
End: 2022-06-30

## 2022-06-30 ENCOUNTER — HOSPITAL ENCOUNTER (OUTPATIENT)
Dept: INFUSION THERAPY | Age: 77
Setting detail: INFUSION SERIES
Discharge: HOME | End: 2022-06-30
Payer: MEDICARE

## 2022-06-30 VITALS
OXYGEN SATURATION: 97 % | BODY MASS INDEX: 35.49 KG/M2 | HEART RATE: 88 BPM | HEIGHT: 65 IN | DIASTOLIC BLOOD PRESSURE: 98 MMHG | RESPIRATION RATE: 18 BRPM | TEMPERATURE: 98 F | WEIGHT: 213 LBS | SYSTOLIC BLOOD PRESSURE: 173 MMHG

## 2022-06-30 DIAGNOSIS — A41.51 SEPTICEMIA DUE TO E. COLI (HCC): ICD-10-CM

## 2022-06-30 DIAGNOSIS — N13.30 HYDRONEPHROSIS, UNSPECIFIED HYDRONEPHROSIS TYPE: Primary | ICD-10-CM

## 2022-06-30 LAB
ALBUMIN SERPL-MCNC: 3.9 G/DL (ref 3.5–5.2)
ALP BLD-CCNC: 63 U/L (ref 35–104)
ALT SERPL-CCNC: 39 U/L (ref 0–32)
ANION GAP SERPL CALCULATED.3IONS-SCNC: 14 MMOL/L (ref 7–16)
AST SERPL-CCNC: 34 U/L (ref 0–31)
BASOPHILS ABSOLUTE: 0.05 E9/L (ref 0–0.2)
BASOPHILS RELATIVE PERCENT: 0.6 % (ref 0–2)
BILIRUB SERPL-MCNC: 0.4 MG/DL (ref 0–1.2)
BUN BLDV-MCNC: 15 MG/DL (ref 6–23)
C-REACTIVE PROTEIN: 3.7 MG/DL (ref 0–0.4)
CALCIUM SERPL-MCNC: 9.7 MG/DL (ref 8.6–10.2)
CHLORIDE BLD-SCNC: 102 MMOL/L (ref 98–107)
CO2: 24 MMOL/L (ref 22–29)
CREAT SERPL-MCNC: 0.7 MG/DL (ref 0.5–1)
EOSINOPHILS ABSOLUTE: 0.14 E9/L (ref 0.05–0.5)
EOSINOPHILS RELATIVE PERCENT: 1.8 % (ref 0–6)
GFR AFRICAN AMERICAN: >60
GFR NON-AFRICAN AMERICAN: >60 ML/MIN/1.73
GLUCOSE BLD-MCNC: 127 MG/DL (ref 74–99)
HCT VFR BLD CALC: 40.9 % (ref 34–48)
HEMOGLOBIN: 13.6 G/DL (ref 11.5–15.5)
IMMATURE GRANULOCYTES #: 0.05 E9/L
IMMATURE GRANULOCYTES %: 0.6 % (ref 0–5)
LYMPHOCYTES ABSOLUTE: 1.11 E9/L (ref 1.5–4)
LYMPHOCYTES RELATIVE PERCENT: 14.2 % (ref 20–42)
MCH RBC QN AUTO: 30.1 PG (ref 26–35)
MCHC RBC AUTO-ENTMCNC: 33.3 % (ref 32–34.5)
MCV RBC AUTO: 90.5 FL (ref 80–99.9)
MONOCYTES ABSOLUTE: 0.84 E9/L (ref 0.1–0.95)
MONOCYTES RELATIVE PERCENT: 10.8 % (ref 2–12)
NEUTROPHILS ABSOLUTE: 5.62 E9/L (ref 1.8–7.3)
NEUTROPHILS RELATIVE PERCENT: 72 % (ref 43–80)
PDW BLD-RTO: 12.6 FL (ref 11.5–15)
PLATELET # BLD: 239 E9/L (ref 130–450)
PMV BLD AUTO: 9.7 FL (ref 7–12)
POTASSIUM SERPL-SCNC: 3.9 MMOL/L (ref 3.5–5)
RBC # BLD: 4.52 E12/L (ref 3.5–5.5)
SEDIMENTATION RATE, ERYTHROCYTE: 43 MM/HR (ref 0–20)
SODIUM BLD-SCNC: 140 MMOL/L (ref 132–146)
TOTAL PROTEIN: 7.1 G/DL (ref 6.4–8.3)
WBC # BLD: 7.8 E9/L (ref 4.5–11.5)

## 2022-06-30 PROCEDURE — 85651 RBC SED RATE NONAUTOMATED: CPT

## 2022-06-30 PROCEDURE — 85025 COMPLETE CBC W/AUTO DIFF WBC: CPT

## 2022-06-30 PROCEDURE — 96374 THER/PROPH/DIAG INJ IV PUSH: CPT

## 2022-06-30 PROCEDURE — 2580000003 HC RX 258: Performed by: SPECIALIST

## 2022-06-30 PROCEDURE — 6360000002 HC RX W HCPCS: Performed by: SPECIALIST

## 2022-06-30 PROCEDURE — 86140 C-REACTIVE PROTEIN: CPT

## 2022-06-30 PROCEDURE — 80053 COMPREHEN METABOLIC PANEL: CPT

## 2022-06-30 PROCEDURE — 36415 COLL VENOUS BLD VENIPUNCTURE: CPT

## 2022-06-30 RX ORDER — HEPARIN SODIUM (PORCINE) LOCK FLUSH IV SOLN 100 UNIT/ML 100 UNIT/ML
300 SOLUTION INTRAVENOUS PRN
Status: DISCONTINUED | OUTPATIENT
Start: 2022-06-30 | End: 2022-07-01 | Stop reason: HOSPADM

## 2022-06-30 RX ORDER — SODIUM CHLORIDE 0.9 % (FLUSH) 0.9 %
5-40 SYRINGE (ML) INJECTION PRN
Status: CANCELLED | OUTPATIENT
Start: 2022-07-01

## 2022-06-30 RX ORDER — EPINEPHRINE 1 MG/ML
0.3 INJECTION, SOLUTION, CONCENTRATE INTRAVENOUS PRN
Status: CANCELLED | OUTPATIENT
Start: 2022-07-01

## 2022-06-30 RX ORDER — DIPHENHYDRAMINE HYDROCHLORIDE 50 MG/ML
50 INJECTION INTRAMUSCULAR; INTRAVENOUS
Status: CANCELLED | OUTPATIENT
Start: 2022-07-01

## 2022-06-30 RX ORDER — DIPHENHYDRAMINE HCL 25 MG
50 TABLET ORAL
Status: CANCELLED
Start: 2022-07-01

## 2022-06-30 RX ORDER — HEPARIN SODIUM (PORCINE) LOCK FLUSH IV SOLN 100 UNIT/ML 100 UNIT/ML
300 SOLUTION INTRAVENOUS PRN
Status: CANCELLED | OUTPATIENT
Start: 2022-07-01

## 2022-06-30 RX ORDER — SODIUM CHLORIDE 0.9 % (FLUSH) 0.9 %
5-40 SYRINGE (ML) INJECTION PRN
Status: DISCONTINUED | OUTPATIENT
Start: 2022-06-30 | End: 2022-07-01 | Stop reason: HOSPADM

## 2022-06-30 RX ADMIN — SODIUM CHLORIDE, PRESERVATIVE FREE 10 ML: 5 INJECTION INTRAVENOUS at 11:09

## 2022-06-30 RX ADMIN — WATER 2000 MG: 1 INJECTION INTRAMUSCULAR; INTRAVENOUS; SUBCUTANEOUS at 11:16

## 2022-06-30 RX ADMIN — SODIUM CHLORIDE, PRESERVATIVE FREE 10 ML: 5 INJECTION INTRAVENOUS at 11:11

## 2022-06-30 RX ADMIN — SODIUM CHLORIDE, PRESERVATIVE FREE 10 ML: 5 INJECTION INTRAVENOUS at 11:21

## 2022-06-30 RX ADMIN — Medication 300 UNITS: at 11:22

## 2022-06-30 NOTE — PROGRESS NOTES
Patient instructed on s/s infection/complication with PICC line to report and instructed on keeping PICC dressing clean, dry and intact patient verbalizes understanding. Tolerated infusion well. Reviewed therapy plan, offered education material and/or discharge material, reviewed medication information and signs and symptoms  and educated on possible side effects, verbalizes good knowledge of current plan patient verbalizes understanding, and has no signs or symptoms to report at this time. Patient discharged. Patient alert and oriented x3. No distress noted. Vital signs stable. Patient denies any new or worsening pain. Patient denies any needs. All questions answered. Next appointment scheduled.  Declines copy of AVS.

## 2022-06-30 NOTE — TELEPHONE ENCOUNTER
She is not easy to deal with.  Would send letter to contact office and if she doesn't she has made her choice

## 2022-06-30 NOTE — TELEPHONE ENCOUNTER
Addended by: ANGELICA GARCIA on: 11/22/2019 12:13 PM     Modules accepted: Orders     [facility] Initial Follow Up Call    Outreach made within 2 business days of discharge: Yes    Patient: Cleave Najjar Patient : 1945   MRN: [id number]  Reason for Admission: Ureteral stone  Discharge Date: 22       Spoke with: 2nd attempt,unable to reach. Left message to return call back.     Discharge department/facility: [facility]    Scheduled appointment with PCP within 7-14 days    Follow Up  Future Appointments   Date Time Provider Toño Castillo   2022 11:30 AM [facility] RM 7 [facility]   2022 11:30 AM [facility] RM 3 [facility]   7/3/2022 11:30 AM [facility] RM 4 [facility]   2022 11:30 AM [facility] RM 6 [facility]   2022 11:00 AM [facility] RM 4 [facility]   2022 11:00 AM [facility] RM 5 [facility]   2022 11:30 AM [facility] RM 8 [facility]   2022 11:30 AM [facility] RM 8 [facility]   2022 11:00 AM [facility] RM 5 [facility]   7/10/2022 11:00 AM [facility] RM 5 [facility]   2022 11:30 AM [facility]IC RM 2 [facility]   2022  1:30 PM DAHIANA Deng - [facility]   2022 11:30 AM [facility] RM 6 [facility]   2022  1:15 PM MD ADELITA Robertson [facility]       Priya Mcrae [facility] [place]

## 2022-07-01 ENCOUNTER — HOSPITAL ENCOUNTER (OUTPATIENT)
Dept: INFUSION THERAPY | Age: 77
Setting detail: INFUSION SERIES
Discharge: HOME | End: 2022-07-01
Payer: MEDICARE

## 2022-07-01 VITALS
WEIGHT: 213 LBS | HEIGHT: 65 IN | TEMPERATURE: 97.9 F | SYSTOLIC BLOOD PRESSURE: 184 MMHG | HEART RATE: 116 BPM | OXYGEN SATURATION: 97 % | DIASTOLIC BLOOD PRESSURE: 99 MMHG | BODY MASS INDEX: 35.49 KG/M2 | RESPIRATION RATE: 18 BRPM

## 2022-07-01 DIAGNOSIS — N13.30 HYDRONEPHROSIS, UNSPECIFIED HYDRONEPHROSIS TYPE: Primary | ICD-10-CM

## 2022-07-01 DIAGNOSIS — A41.51 SEPTICEMIA DUE TO E. COLI (HCC): ICD-10-CM

## 2022-07-01 PROCEDURE — 6360000002 HC RX W HCPCS: Performed by: SPECIALIST

## 2022-07-01 PROCEDURE — 96374 THER/PROPH/DIAG INJ IV PUSH: CPT

## 2022-07-01 PROCEDURE — 2580000003 HC RX 258: Performed by: SPECIALIST

## 2022-07-01 RX ORDER — DIPHENHYDRAMINE HCL 25 MG
50 TABLET ORAL
Status: CANCELLED
Start: 2022-07-02

## 2022-07-01 RX ORDER — DIPHENHYDRAMINE HYDROCHLORIDE 50 MG/ML
50 INJECTION INTRAMUSCULAR; INTRAVENOUS
Status: CANCELLED | OUTPATIENT
Start: 2022-07-02

## 2022-07-01 RX ORDER — HEPARIN SODIUM (PORCINE) LOCK FLUSH IV SOLN 100 UNIT/ML 100 UNIT/ML
300 SOLUTION INTRAVENOUS PRN
Status: DISCONTINUED | OUTPATIENT
Start: 2022-07-01 | End: 2022-07-02 | Stop reason: HOSPADM

## 2022-07-01 RX ORDER — HEPARIN SODIUM (PORCINE) LOCK FLUSH IV SOLN 100 UNIT/ML 100 UNIT/ML
300 SOLUTION INTRAVENOUS PRN
Status: CANCELLED | OUTPATIENT
Start: 2022-07-02

## 2022-07-01 RX ORDER — SODIUM CHLORIDE 0.9 % (FLUSH) 0.9 %
5-40 SYRINGE (ML) INJECTION PRN
Status: DISCONTINUED | OUTPATIENT
Start: 2022-07-01 | End: 2022-07-02 | Stop reason: HOSPADM

## 2022-07-01 RX ORDER — SODIUM CHLORIDE 0.9 % (FLUSH) 0.9 %
5-40 SYRINGE (ML) INJECTION PRN
Status: CANCELLED | OUTPATIENT
Start: 2022-07-02

## 2022-07-01 RX ORDER — EPINEPHRINE 1 MG/ML
0.3 INJECTION, SOLUTION, CONCENTRATE INTRAVENOUS PRN
Status: CANCELLED | OUTPATIENT
Start: 2022-07-02

## 2022-07-01 RX ADMIN — SODIUM CHLORIDE, PRESERVATIVE FREE 10 ML: 5 INJECTION INTRAVENOUS at 11:40

## 2022-07-01 RX ADMIN — Medication 300 UNITS: at 11:46

## 2022-07-01 RX ADMIN — WATER 2000 MG: 1 INJECTION INTRAMUSCULAR; INTRAVENOUS; SUBCUTANEOUS at 11:41

## 2022-07-01 RX ADMIN — SODIUM CHLORIDE, PRESERVATIVE FREE 10 ML: 5 INJECTION INTRAVENOUS at 11:46

## 2022-07-01 ASSESSMENT — PAIN DESCRIPTION - LOCATION: LOCATION: BACK

## 2022-07-01 ASSESSMENT — PAIN SCALES - GENERAL: PAINLEVEL_OUTOF10: 2

## 2022-07-01 ASSESSMENT — PAIN DESCRIPTION - ORIENTATION: ORIENTATION: RIGHT;MID

## 2022-07-01 ASSESSMENT — PAIN DESCRIPTION - FREQUENCY: FREQUENCY: CONTINUOUS

## 2022-07-02 ENCOUNTER — HOSPITAL ENCOUNTER (OUTPATIENT)
Dept: INFUSION THERAPY | Age: 77
Setting detail: INFUSION SERIES
Discharge: HOME | End: 2022-07-02
Payer: MEDICARE

## 2022-07-02 VITALS
HEART RATE: 112 BPM | RESPIRATION RATE: 18 BRPM | SYSTOLIC BLOOD PRESSURE: 169 MMHG | WEIGHT: 213 LBS | HEIGHT: 65 IN | TEMPERATURE: 97.8 F | DIASTOLIC BLOOD PRESSURE: 106 MMHG | BODY MASS INDEX: 35.49 KG/M2 | OXYGEN SATURATION: 98 %

## 2022-07-02 DIAGNOSIS — A41.51 SEPTICEMIA DUE TO E. COLI (HCC): ICD-10-CM

## 2022-07-02 DIAGNOSIS — N13.30 HYDRONEPHROSIS, UNSPECIFIED HYDRONEPHROSIS TYPE: Primary | ICD-10-CM

## 2022-07-02 PROCEDURE — 96374 THER/PROPH/DIAG INJ IV PUSH: CPT

## 2022-07-02 PROCEDURE — 6360000002 HC RX W HCPCS: Performed by: SPECIALIST

## 2022-07-02 PROCEDURE — 2580000003 HC RX 258: Performed by: SPECIALIST

## 2022-07-02 RX ORDER — HEPARIN SODIUM (PORCINE) LOCK FLUSH IV SOLN 100 UNIT/ML 100 UNIT/ML
300 SOLUTION INTRAVENOUS PRN
Status: DISCONTINUED | OUTPATIENT
Start: 2022-07-02 | End: 2022-07-03 | Stop reason: HOSPADM

## 2022-07-02 RX ORDER — EPINEPHRINE 1 MG/ML
0.3 INJECTION, SOLUTION, CONCENTRATE INTRAVENOUS PRN
Status: CANCELLED | OUTPATIENT
Start: 2022-07-03

## 2022-07-02 RX ORDER — HEPARIN SODIUM (PORCINE) LOCK FLUSH IV SOLN 100 UNIT/ML 100 UNIT/ML
300 SOLUTION INTRAVENOUS PRN
Status: CANCELLED | OUTPATIENT
Start: 2022-07-03

## 2022-07-02 RX ORDER — SODIUM CHLORIDE 0.9 % (FLUSH) 0.9 %
5-40 SYRINGE (ML) INJECTION PRN
Status: DISCONTINUED | OUTPATIENT
Start: 2022-07-02 | End: 2022-07-03 | Stop reason: HOSPADM

## 2022-07-02 RX ORDER — DIPHENHYDRAMINE HYDROCHLORIDE 50 MG/ML
50 INJECTION INTRAMUSCULAR; INTRAVENOUS
Status: CANCELLED | OUTPATIENT
Start: 2022-07-03

## 2022-07-02 RX ORDER — SODIUM CHLORIDE 0.9 % (FLUSH) 0.9 %
5-40 SYRINGE (ML) INJECTION PRN
Status: CANCELLED | OUTPATIENT
Start: 2022-07-03

## 2022-07-02 RX ORDER — DIPHENHYDRAMINE HCL 25 MG
50 TABLET ORAL
Status: CANCELLED
Start: 2022-07-03

## 2022-07-02 RX ADMIN — WATER 2000 MG: 1 INJECTION INTRAMUSCULAR; INTRAVENOUS; SUBCUTANEOUS at 11:31

## 2022-07-02 RX ADMIN — Medication 300 UNITS: at 11:36

## 2022-07-02 RX ADMIN — SODIUM CHLORIDE, PRESERVATIVE FREE 10 ML: 5 INJECTION INTRAVENOUS at 11:37

## 2022-07-02 RX ADMIN — SODIUM CHLORIDE, PRESERVATIVE FREE 10 ML: 5 INJECTION INTRAVENOUS at 11:30

## 2022-07-02 ASSESSMENT — PAIN - FUNCTIONAL ASSESSMENT: PAIN_FUNCTIONAL_ASSESSMENT: PREVENTS OR INTERFERES SOME ACTIVE ACTIVITIES AND ADLS

## 2022-07-02 ASSESSMENT — PAIN DESCRIPTION - PAIN TYPE: TYPE: ACUTE PAIN

## 2022-07-02 ASSESSMENT — PAIN DESCRIPTION - ORIENTATION: ORIENTATION: RIGHT;MID

## 2022-07-02 ASSESSMENT — PAIN DESCRIPTION - LOCATION: LOCATION: BACK

## 2022-07-02 ASSESSMENT — PAIN DESCRIPTION - FREQUENCY: FREQUENCY: CONTINUOUS

## 2022-07-02 ASSESSMENT — PAIN SCALES - GENERAL: PAINLEVEL_OUTOF10: 3

## 2022-07-02 ASSESSMENT — PAIN DESCRIPTION - DESCRIPTORS: DESCRIPTORS: DISCOMFORT

## 2022-07-02 ASSESSMENT — PAIN DESCRIPTION - ONSET: ONSET: ON-GOING

## 2022-07-02 NOTE — PROGRESS NOTES
Patient instructed on s/s infection/complication with PICC line to report and instructed on keeping PICC dressing clean, dry and intact patient verbalizes understanding. Tolerated iv push well. Reviewed therapy plan, offered education material and/or discharge material, reviewed medication information and signs and symptoms  and educated on possible side effects, verbalizes good knowledge of current plan patient verbalizes understanding, and has no signs or symptoms to report at this time. Patient discharged. Patient alert and oriented x3. No distress noted. Vital signs stable. Patient denies any new or worsening pain. Patient denies any needs. All questions answered. Next appointment scheduled.

## 2022-07-03 ENCOUNTER — HOSPITAL ENCOUNTER (OUTPATIENT)
Dept: INFUSION THERAPY | Age: 77
Setting detail: INFUSION SERIES
Discharge: HOME | End: 2022-07-03
Payer: MEDICARE

## 2022-07-03 VITALS
HEART RATE: 87 BPM | SYSTOLIC BLOOD PRESSURE: 148 MMHG | DIASTOLIC BLOOD PRESSURE: 91 MMHG | BODY MASS INDEX: 35.49 KG/M2 | OXYGEN SATURATION: 98 % | WEIGHT: 213 LBS | TEMPERATURE: 97.7 F | RESPIRATION RATE: 18 BRPM | HEIGHT: 65 IN

## 2022-07-03 DIAGNOSIS — A41.51 SEPTICEMIA DUE TO E. COLI (HCC): ICD-10-CM

## 2022-07-03 DIAGNOSIS — N13.30 HYDRONEPHROSIS, UNSPECIFIED HYDRONEPHROSIS TYPE: Primary | ICD-10-CM

## 2022-07-03 PROCEDURE — 96374 THER/PROPH/DIAG INJ IV PUSH: CPT

## 2022-07-03 PROCEDURE — 6360000002 HC RX W HCPCS: Performed by: SPECIALIST

## 2022-07-03 PROCEDURE — 2580000003 HC RX 258: Performed by: SPECIALIST

## 2022-07-03 RX ORDER — HEPARIN SODIUM (PORCINE) LOCK FLUSH IV SOLN 100 UNIT/ML 100 UNIT/ML
300 SOLUTION INTRAVENOUS PRN
Status: CANCELLED | OUTPATIENT
Start: 2022-07-04

## 2022-07-03 RX ORDER — SODIUM CHLORIDE 0.9 % (FLUSH) 0.9 %
5-40 SYRINGE (ML) INJECTION PRN
Status: DISCONTINUED | OUTPATIENT
Start: 2022-07-03 | End: 2022-07-04 | Stop reason: HOSPADM

## 2022-07-03 RX ORDER — DIPHENHYDRAMINE HCL 25 MG
50 TABLET ORAL
Status: CANCELLED
Start: 2022-07-04

## 2022-07-03 RX ORDER — DIPHENHYDRAMINE HYDROCHLORIDE 50 MG/ML
50 INJECTION INTRAMUSCULAR; INTRAVENOUS
Status: CANCELLED | OUTPATIENT
Start: 2022-07-04

## 2022-07-03 RX ORDER — EPINEPHRINE 1 MG/ML
0.3 INJECTION, SOLUTION, CONCENTRATE INTRAVENOUS PRN
Status: CANCELLED | OUTPATIENT
Start: 2022-07-04

## 2022-07-03 RX ORDER — HEPARIN SODIUM (PORCINE) LOCK FLUSH IV SOLN 100 UNIT/ML 100 UNIT/ML
300 SOLUTION INTRAVENOUS PRN
Status: DISCONTINUED | OUTPATIENT
Start: 2022-07-03 | End: 2022-07-04 | Stop reason: HOSPADM

## 2022-07-03 RX ORDER — SODIUM CHLORIDE 0.9 % (FLUSH) 0.9 %
5-40 SYRINGE (ML) INJECTION PRN
Status: CANCELLED | OUTPATIENT
Start: 2022-07-04

## 2022-07-03 RX ADMIN — SODIUM CHLORIDE, PRESERVATIVE FREE 10 ML: 5 INJECTION INTRAVENOUS at 11:27

## 2022-07-03 RX ADMIN — Medication 300 UNITS: at 11:34

## 2022-07-03 RX ADMIN — WATER 2000 MG: 1 INJECTION INTRAMUSCULAR; INTRAVENOUS; SUBCUTANEOUS at 11:28

## 2022-07-03 RX ADMIN — SODIUM CHLORIDE, PRESERVATIVE FREE 10 ML: 5 INJECTION INTRAVENOUS at 11:33

## 2022-07-03 ASSESSMENT — PAIN DESCRIPTION - DESCRIPTORS: DESCRIPTORS: ACHING

## 2022-07-03 ASSESSMENT — PAIN DESCRIPTION - FREQUENCY: FREQUENCY: CONTINUOUS

## 2022-07-03 ASSESSMENT — PAIN SCALES - GENERAL: PAINLEVEL_OUTOF10: 3

## 2022-07-03 ASSESSMENT — PAIN DESCRIPTION - ORIENTATION: ORIENTATION: RIGHT;LOWER

## 2022-07-03 ASSESSMENT — PAIN DESCRIPTION - LOCATION: LOCATION: BACK;OTHER (COMMENT)

## 2022-07-04 ENCOUNTER — HOSPITAL ENCOUNTER (OUTPATIENT)
Dept: INFUSION THERAPY | Age: 77
Setting detail: INFUSION SERIES
Discharge: HOME | End: 2022-07-04
Payer: MEDICARE

## 2022-07-04 VITALS
SYSTOLIC BLOOD PRESSURE: 146 MMHG | HEART RATE: 111 BPM | RESPIRATION RATE: 18 BRPM | HEIGHT: 65 IN | OXYGEN SATURATION: 98 % | TEMPERATURE: 97.6 F | DIASTOLIC BLOOD PRESSURE: 89 MMHG | WEIGHT: 213 LBS | BODY MASS INDEX: 35.49 KG/M2

## 2022-07-04 DIAGNOSIS — N13.30 HYDRONEPHROSIS, UNSPECIFIED HYDRONEPHROSIS TYPE: Primary | ICD-10-CM

## 2022-07-04 DIAGNOSIS — A41.51 SEPTICEMIA DUE TO E. COLI (HCC): ICD-10-CM

## 2022-07-04 PROCEDURE — 2580000003 HC RX 258: Performed by: SPECIALIST

## 2022-07-04 PROCEDURE — 6360000002 HC RX W HCPCS: Performed by: SPECIALIST

## 2022-07-04 PROCEDURE — 96374 THER/PROPH/DIAG INJ IV PUSH: CPT

## 2022-07-04 RX ORDER — HEPARIN SODIUM (PORCINE) LOCK FLUSH IV SOLN 100 UNIT/ML 100 UNIT/ML
300 SOLUTION INTRAVENOUS PRN
Status: CANCELLED | OUTPATIENT
Start: 2022-07-05

## 2022-07-04 RX ORDER — DIPHENHYDRAMINE HYDROCHLORIDE 50 MG/ML
50 INJECTION INTRAMUSCULAR; INTRAVENOUS
Status: CANCELLED | OUTPATIENT
Start: 2022-07-05

## 2022-07-04 RX ORDER — EPINEPHRINE 1 MG/ML
0.3 INJECTION, SOLUTION, CONCENTRATE INTRAVENOUS PRN
Status: CANCELLED | OUTPATIENT
Start: 2022-07-05

## 2022-07-04 RX ORDER — SODIUM CHLORIDE 0.9 % (FLUSH) 0.9 %
5-40 SYRINGE (ML) INJECTION PRN
Status: DISCONTINUED | OUTPATIENT
Start: 2022-07-04 | End: 2022-07-05 | Stop reason: HOSPADM

## 2022-07-04 RX ORDER — SODIUM CHLORIDE 0.9 % (FLUSH) 0.9 %
5-40 SYRINGE (ML) INJECTION PRN
Status: CANCELLED | OUTPATIENT
Start: 2022-07-05

## 2022-07-04 RX ORDER — HEPARIN SODIUM (PORCINE) LOCK FLUSH IV SOLN 100 UNIT/ML 100 UNIT/ML
300 SOLUTION INTRAVENOUS PRN
Status: DISCONTINUED | OUTPATIENT
Start: 2022-07-04 | End: 2022-07-05 | Stop reason: HOSPADM

## 2022-07-04 RX ORDER — DIPHENHYDRAMINE HCL 25 MG
50 TABLET ORAL
Status: CANCELLED
Start: 2022-07-05

## 2022-07-04 RX ADMIN — Medication 300 UNITS: at 11:33

## 2022-07-04 RX ADMIN — WATER 2000 MG: 1 INJECTION INTRAMUSCULAR; INTRAVENOUS; SUBCUTANEOUS at 11:27

## 2022-07-04 RX ADMIN — SODIUM CHLORIDE, PRESERVATIVE FREE 10 ML: 5 INJECTION INTRAVENOUS at 11:27

## 2022-07-04 RX ADMIN — SODIUM CHLORIDE, PRESERVATIVE FREE 10 ML: 5 INJECTION INTRAVENOUS at 11:33

## 2022-07-04 ASSESSMENT — PAIN DESCRIPTION - DESCRIPTORS: DESCRIPTORS: OTHER (COMMENT)

## 2022-07-04 ASSESSMENT — PAIN DESCRIPTION - LOCATION: LOCATION: OTHER (COMMENT)

## 2022-07-04 ASSESSMENT — PAIN DESCRIPTION - FREQUENCY: FREQUENCY: CONTINUOUS

## 2022-07-04 ASSESSMENT — PAIN DESCRIPTION - ORIENTATION: ORIENTATION: RIGHT

## 2022-07-04 ASSESSMENT — PAIN SCALES - GENERAL: PAINLEVEL_OUTOF10: 1

## 2022-07-05 ENCOUNTER — HOSPITAL ENCOUNTER (OUTPATIENT)
Dept: INFUSION THERAPY | Age: 77
Setting detail: INFUSION SERIES
Discharge: HOME | End: 2022-07-05
Payer: MEDICARE

## 2022-07-05 VITALS
BODY MASS INDEX: 35.49 KG/M2 | RESPIRATION RATE: 18 BRPM | OXYGEN SATURATION: 99 % | DIASTOLIC BLOOD PRESSURE: 94 MMHG | HEIGHT: 65 IN | SYSTOLIC BLOOD PRESSURE: 152 MMHG | WEIGHT: 213 LBS | TEMPERATURE: 98 F | HEART RATE: 108 BPM

## 2022-07-05 DIAGNOSIS — N13.30 HYDRONEPHROSIS, UNSPECIFIED HYDRONEPHROSIS TYPE: Primary | ICD-10-CM

## 2022-07-05 DIAGNOSIS — A41.51 SEPTICEMIA DUE TO E. COLI (HCC): ICD-10-CM

## 2022-07-05 LAB
ALBUMIN SERPL-MCNC: 4.1 G/DL (ref 3.5–5.2)
ALP BLD-CCNC: 69 U/L (ref 35–104)
ALT SERPL-CCNC: 31 U/L (ref 0–32)
ANION GAP SERPL CALCULATED.3IONS-SCNC: 12 MMOL/L (ref 7–16)
AST SERPL-CCNC: 19 U/L (ref 0–31)
BASOPHILS ABSOLUTE: 0.04 E9/L (ref 0–0.2)
BASOPHILS RELATIVE PERCENT: 0.6 % (ref 0–2)
BILIRUB SERPL-MCNC: 0.4 MG/DL (ref 0–1.2)
BUN BLDV-MCNC: 15 MG/DL (ref 6–23)
C-REACTIVE PROTEIN: 0.9 MG/DL (ref 0–0.4)
CALCIUM SERPL-MCNC: 9.9 MG/DL (ref 8.6–10.2)
CHLORIDE BLD-SCNC: 104 MMOL/L (ref 98–107)
CO2: 24 MMOL/L (ref 22–29)
CREAT SERPL-MCNC: 0.8 MG/DL (ref 0.5–1)
EOSINOPHILS ABSOLUTE: 0.15 E9/L (ref 0.05–0.5)
EOSINOPHILS RELATIVE PERCENT: 2.2 % (ref 0–6)
GFR AFRICAN AMERICAN: >60
GFR NON-AFRICAN AMERICAN: >60 ML/MIN/1.73
GLUCOSE BLD-MCNC: 115 MG/DL (ref 74–99)
HCT VFR BLD CALC: 42.7 % (ref 34–48)
HEMOGLOBIN: 14 G/DL (ref 11.5–15.5)
IMMATURE GRANULOCYTES #: 0.04 E9/L
IMMATURE GRANULOCYTES %: 0.6 % (ref 0–5)
LYMPHOCYTES ABSOLUTE: 1.11 E9/L (ref 1.5–4)
LYMPHOCYTES RELATIVE PERCENT: 16.4 % (ref 20–42)
MCH RBC QN AUTO: 30.2 PG (ref 26–35)
MCHC RBC AUTO-ENTMCNC: 32.8 % (ref 32–34.5)
MCV RBC AUTO: 92 FL (ref 80–99.9)
MONOCYTES ABSOLUTE: 0.61 E9/L (ref 0.1–0.95)
MONOCYTES RELATIVE PERCENT: 9 % (ref 2–12)
NEUTROPHILS ABSOLUTE: 4.82 E9/L (ref 1.8–7.3)
NEUTROPHILS RELATIVE PERCENT: 71.2 % (ref 43–80)
PDW BLD-RTO: 12.9 FL (ref 11.5–15)
PLATELET # BLD: 301 E9/L (ref 130–450)
PMV BLD AUTO: 9.3 FL (ref 7–12)
POTASSIUM SERPL-SCNC: 4.1 MMOL/L (ref 3.5–5)
RBC # BLD: 4.64 E12/L (ref 3.5–5.5)
SEDIMENTATION RATE, ERYTHROCYTE: 30 MM/HR (ref 0–20)
SODIUM BLD-SCNC: 140 MMOL/L (ref 132–146)
TOTAL PROTEIN: 7.1 G/DL (ref 6.4–8.3)
WBC # BLD: 6.8 E9/L (ref 4.5–11.5)

## 2022-07-05 PROCEDURE — 36415 COLL VENOUS BLD VENIPUNCTURE: CPT

## 2022-07-05 PROCEDURE — 80053 COMPREHEN METABOLIC PANEL: CPT

## 2022-07-05 PROCEDURE — 96374 THER/PROPH/DIAG INJ IV PUSH: CPT

## 2022-07-05 PROCEDURE — 86140 C-REACTIVE PROTEIN: CPT

## 2022-07-05 PROCEDURE — 2580000003 HC RX 258: Performed by: SPECIALIST

## 2022-07-05 PROCEDURE — 85651 RBC SED RATE NONAUTOMATED: CPT

## 2022-07-05 PROCEDURE — 6360000002 HC RX W HCPCS: Performed by: SPECIALIST

## 2022-07-05 PROCEDURE — 85025 COMPLETE CBC W/AUTO DIFF WBC: CPT

## 2022-07-05 RX ORDER — HEPARIN SODIUM (PORCINE) LOCK FLUSH IV SOLN 100 UNIT/ML 100 UNIT/ML
300 SOLUTION INTRAVENOUS PRN
Status: DISCONTINUED | OUTPATIENT
Start: 2022-07-05 | End: 2022-07-06 | Stop reason: HOSPADM

## 2022-07-05 RX ORDER — DIPHENHYDRAMINE HYDROCHLORIDE 50 MG/ML
50 INJECTION INTRAMUSCULAR; INTRAVENOUS
Status: CANCELLED | OUTPATIENT
Start: 2022-07-06

## 2022-07-05 RX ORDER — EPINEPHRINE 1 MG/ML
0.3 INJECTION, SOLUTION, CONCENTRATE INTRAVENOUS PRN
Status: CANCELLED | OUTPATIENT
Start: 2022-07-06

## 2022-07-05 RX ORDER — HEPARIN SODIUM (PORCINE) LOCK FLUSH IV SOLN 100 UNIT/ML 100 UNIT/ML
300 SOLUTION INTRAVENOUS PRN
Status: CANCELLED | OUTPATIENT
Start: 2022-07-06

## 2022-07-05 RX ORDER — DIPHENHYDRAMINE HCL 25 MG
50 TABLET ORAL
Status: CANCELLED
Start: 2022-07-06

## 2022-07-05 RX ORDER — SODIUM CHLORIDE 0.9 % (FLUSH) 0.9 %
5-40 SYRINGE (ML) INJECTION PRN
Status: CANCELLED | OUTPATIENT
Start: 2022-07-06

## 2022-07-05 RX ORDER — SODIUM CHLORIDE 0.9 % (FLUSH) 0.9 %
5-40 SYRINGE (ML) INJECTION PRN
Status: DISCONTINUED | OUTPATIENT
Start: 2022-07-05 | End: 2022-07-06 | Stop reason: HOSPADM

## 2022-07-05 RX ADMIN — SODIUM CHLORIDE, PRESERVATIVE FREE 10 ML: 5 INJECTION INTRAVENOUS at 11:01

## 2022-07-05 RX ADMIN — SODIUM CHLORIDE, PRESERVATIVE FREE 10 ML: 5 INJECTION INTRAVENOUS at 10:54

## 2022-07-05 RX ADMIN — WATER 2000 MG: 1 INJECTION INTRAMUSCULAR; INTRAVENOUS; SUBCUTANEOUS at 10:56

## 2022-07-05 RX ADMIN — Medication 300 UNITS: at 11:02

## 2022-07-05 RX ADMIN — SODIUM CHLORIDE, PRESERVATIVE FREE 10 ML: 5 INJECTION INTRAVENOUS at 10:52

## 2022-07-05 ASSESSMENT — PAIN SCALES - GENERAL: PAINLEVEL_OUTOF10: 1

## 2022-07-05 ASSESSMENT — PAIN DESCRIPTION - FREQUENCY: FREQUENCY: CONTINUOUS

## 2022-07-05 ASSESSMENT — PAIN DESCRIPTION - ORIENTATION: ORIENTATION: RIGHT

## 2022-07-05 ASSESSMENT — PAIN DESCRIPTION - DESCRIPTORS: DESCRIPTORS: ACHING

## 2022-07-05 ASSESSMENT — PAIN DESCRIPTION - LOCATION: LOCATION: OTHER (COMMENT)

## 2022-07-06 ENCOUNTER — HOSPITAL ENCOUNTER (OUTPATIENT)
Dept: INFUSION THERAPY | Age: 77
Setting detail: INFUSION SERIES
Discharge: HOME | End: 2022-07-06
Payer: MEDICARE

## 2022-07-06 VITALS
OXYGEN SATURATION: 97 % | DIASTOLIC BLOOD PRESSURE: 89 MMHG | TEMPERATURE: 97.7 F | SYSTOLIC BLOOD PRESSURE: 141 MMHG | RESPIRATION RATE: 18 BRPM | HEART RATE: 107 BPM

## 2022-07-06 DIAGNOSIS — N13.30 HYDRONEPHROSIS, UNSPECIFIED HYDRONEPHROSIS TYPE: Primary | ICD-10-CM

## 2022-07-06 DIAGNOSIS — A41.51 SEPTICEMIA DUE TO E. COLI (HCC): ICD-10-CM

## 2022-07-06 PROCEDURE — 96374 THER/PROPH/DIAG INJ IV PUSH: CPT

## 2022-07-06 PROCEDURE — 2580000003 HC RX 258: Performed by: SPECIALIST

## 2022-07-06 PROCEDURE — 6360000002 HC RX W HCPCS: Performed by: SPECIALIST

## 2022-07-06 RX ORDER — DIPHENHYDRAMINE HYDROCHLORIDE 50 MG/ML
50 INJECTION INTRAMUSCULAR; INTRAVENOUS
Status: CANCELLED | OUTPATIENT
Start: 2022-07-07

## 2022-07-06 RX ORDER — SODIUM CHLORIDE 0.9 % (FLUSH) 0.9 %
5-40 SYRINGE (ML) INJECTION PRN
Status: DISCONTINUED | OUTPATIENT
Start: 2022-07-06 | End: 2022-07-07 | Stop reason: HOSPADM

## 2022-07-06 RX ORDER — HEPARIN SODIUM (PORCINE) LOCK FLUSH IV SOLN 100 UNIT/ML 100 UNIT/ML
300 SOLUTION INTRAVENOUS PRN
Status: CANCELLED | OUTPATIENT
Start: 2022-07-07

## 2022-07-06 RX ORDER — HEPARIN SODIUM (PORCINE) LOCK FLUSH IV SOLN 100 UNIT/ML 100 UNIT/ML
300 SOLUTION INTRAVENOUS PRN
Status: DISCONTINUED | OUTPATIENT
Start: 2022-07-06 | End: 2022-07-07 | Stop reason: HOSPADM

## 2022-07-06 RX ORDER — SODIUM CHLORIDE 0.9 % (FLUSH) 0.9 %
5-40 SYRINGE (ML) INJECTION PRN
Status: CANCELLED | OUTPATIENT
Start: 2022-07-07

## 2022-07-06 RX ORDER — EPINEPHRINE 1 MG/ML
0.3 INJECTION, SOLUTION, CONCENTRATE INTRAVENOUS PRN
Status: CANCELLED | OUTPATIENT
Start: 2022-07-07

## 2022-07-06 RX ORDER — DIPHENHYDRAMINE HCL 25 MG
50 TABLET ORAL
Status: CANCELLED
Start: 2022-07-07

## 2022-07-06 RX ADMIN — Medication 300 UNITS: at 11:07

## 2022-07-06 RX ADMIN — SODIUM CHLORIDE, PRESERVATIVE FREE 10 ML: 5 INJECTION INTRAVENOUS at 10:58

## 2022-07-06 RX ADMIN — SODIUM CHLORIDE, PRESERVATIVE FREE 10 ML: 5 INJECTION INTRAVENOUS at 11:07

## 2022-07-06 RX ADMIN — WATER 2000 MG: 1 INJECTION INTRAMUSCULAR; INTRAVENOUS; SUBCUTANEOUS at 10:59

## 2022-07-06 NOTE — PROGRESS NOTES
Patient tolerated infusion well. Patient alert and oriented x3. No distress noted. Vital signs stable. Patient denies any new or worsening pain. Patient educated on signs and symptoms of reaction to medication. Educated patient on possible side effects and treatment of medication. Patient verbalized understanding. Offered patient education an/or discharge material.  Patient declined. Patient denies any needs. All questions answered. Tolerated infusion well. Reviewed therapy plan, offered education material and/or discharge material, reviewed medication information and signs and symptoms  and educated on possible side effects, verbalizes good knowledge of current plan patient verbalizes understanding, and has no signs or symptoms to report at this time. Patient discharged. Patient alert and oriented x3. No distress noted. Vital signs stable. Patient denies any new or worsening pain. Patient denies any needs. All questions answered. Next appointment scheduled. declinescopy of AVS. Patient instructed on s/s infection/complication with midline to report and instructed on keeping midline dressing clean, dry and intact patient verbalizes understanding.

## 2022-07-07 ENCOUNTER — OFFICE VISIT (OUTPATIENT)
Dept: FAMILY MEDICINE CLINIC | Age: 77
End: 2022-07-07
Payer: MEDICARE

## 2022-07-07 ENCOUNTER — HOSPITAL ENCOUNTER (OUTPATIENT)
Dept: INFUSION THERAPY | Age: 77
Setting detail: INFUSION SERIES
Discharge: HOME | End: 2022-07-07
Payer: MEDICARE

## 2022-07-07 VITALS
TEMPERATURE: 98.4 F | HEIGHT: 65 IN | DIASTOLIC BLOOD PRESSURE: 82 MMHG | RESPIRATION RATE: 18 BRPM | BODY MASS INDEX: 33.92 KG/M2 | SYSTOLIC BLOOD PRESSURE: 138 MMHG | HEART RATE: 118 BPM | WEIGHT: 203.6 LBS | OXYGEN SATURATION: 97 %

## 2022-07-07 VITALS
SYSTOLIC BLOOD PRESSURE: 137 MMHG | DIASTOLIC BLOOD PRESSURE: 87 MMHG | RESPIRATION RATE: 16 BRPM | TEMPERATURE: 97.2 F | HEART RATE: 79 BPM

## 2022-07-07 DIAGNOSIS — N20.1 URETERAL STONE: ICD-10-CM

## 2022-07-07 DIAGNOSIS — Z09 HOSPITAL DISCHARGE FOLLOW-UP: Primary | ICD-10-CM

## 2022-07-07 DIAGNOSIS — H40.1131 PRIMARY OPEN ANGLE GLAUCOMA (POAG) OF BOTH EYES, MILD STAGE: ICD-10-CM

## 2022-07-07 DIAGNOSIS — A41.51 SEPTICEMIA DUE TO E. COLI (HCC): ICD-10-CM

## 2022-07-07 DIAGNOSIS — N13.30 HYDRONEPHROSIS, UNSPECIFIED HYDRONEPHROSIS TYPE: Primary | ICD-10-CM

## 2022-07-07 LAB
ALBUMIN SERPL-MCNC: 3.9 G/DL (ref 3.5–5.2)
ALP BLD-CCNC: 67 U/L (ref 35–104)
ALT SERPL-CCNC: 26 U/L (ref 0–32)
ANION GAP SERPL CALCULATED.3IONS-SCNC: 11 MMOL/L (ref 7–16)
AST SERPL-CCNC: 18 U/L (ref 0–31)
BASOPHILS ABSOLUTE: 0.03 E9/L (ref 0–0.2)
BASOPHILS RELATIVE PERCENT: 0.5 % (ref 0–2)
BILIRUB SERPL-MCNC: 0.4 MG/DL (ref 0–1.2)
BUN BLDV-MCNC: 18 MG/DL (ref 6–23)
CALCIUM SERPL-MCNC: 9.9 MG/DL (ref 8.6–10.2)
CHLORIDE BLD-SCNC: 103 MMOL/L (ref 98–107)
CO2: 23 MMOL/L (ref 22–29)
CREAT SERPL-MCNC: 0.7 MG/DL (ref 0.5–1)
EOSINOPHILS ABSOLUTE: 0.12 E9/L (ref 0.05–0.5)
EOSINOPHILS RELATIVE PERCENT: 1.9 % (ref 0–6)
GFR AFRICAN AMERICAN: >60
GFR NON-AFRICAN AMERICAN: >60 ML/MIN/1.73
GLUCOSE BLD-MCNC: 107 MG/DL (ref 74–99)
HCT VFR BLD CALC: 41.4 % (ref 34–48)
HEMOGLOBIN: 13.6 G/DL (ref 11.5–15.5)
IMMATURE GRANULOCYTES #: 0.02 E9/L
IMMATURE GRANULOCYTES %: 0.3 % (ref 0–5)
LYMPHOCYTES ABSOLUTE: 1.2 E9/L (ref 1.5–4)
LYMPHOCYTES RELATIVE PERCENT: 18.8 % (ref 20–42)
MCH RBC QN AUTO: 30.1 PG (ref 26–35)
MCHC RBC AUTO-ENTMCNC: 32.9 % (ref 32–34.5)
MCV RBC AUTO: 91.6 FL (ref 80–99.9)
MONOCYTES ABSOLUTE: 0.62 E9/L (ref 0.1–0.95)
MONOCYTES RELATIVE PERCENT: 9.7 % (ref 2–12)
NEUTROPHILS ABSOLUTE: 4.38 E9/L (ref 1.8–7.3)
NEUTROPHILS RELATIVE PERCENT: 68.8 % (ref 43–80)
PDW BLD-RTO: 12.9 FL (ref 11.5–15)
PLATELET # BLD: 301 E9/L (ref 130–450)
PMV BLD AUTO: 9.7 FL (ref 7–12)
POTASSIUM SERPL-SCNC: 4.2 MMOL/L (ref 3.5–5)
RBC # BLD: 4.52 E12/L (ref 3.5–5.5)
SODIUM BLD-SCNC: 137 MMOL/L (ref 132–146)
TOTAL PROTEIN: 7.2 G/DL (ref 6.4–8.3)
WBC # BLD: 6.4 E9/L (ref 4.5–11.5)

## 2022-07-07 PROCEDURE — 80053 COMPREHEN METABOLIC PANEL: CPT

## 2022-07-07 PROCEDURE — 1111F DSCHRG MED/CURRENT MED MERGE: CPT | Performed by: FAMILY MEDICINE

## 2022-07-07 PROCEDURE — 6360000002 HC RX W HCPCS: Performed by: SPECIALIST

## 2022-07-07 PROCEDURE — 2580000003 HC RX 258: Performed by: SPECIALIST

## 2022-07-07 PROCEDURE — 85025 COMPLETE CBC W/AUTO DIFF WBC: CPT

## 2022-07-07 PROCEDURE — 36415 COLL VENOUS BLD VENIPUNCTURE: CPT

## 2022-07-07 PROCEDURE — 96374 THER/PROPH/DIAG INJ IV PUSH: CPT

## 2022-07-07 PROCEDURE — 99495 TRANSJ CARE MGMT MOD F2F 14D: CPT | Performed by: FAMILY MEDICINE

## 2022-07-07 RX ORDER — HEPARIN SODIUM (PORCINE) LOCK FLUSH IV SOLN 100 UNIT/ML 100 UNIT/ML
300 SOLUTION INTRAVENOUS PRN
Status: DISCONTINUED | OUTPATIENT
Start: 2022-07-07 | End: 2022-07-08 | Stop reason: HOSPADM

## 2022-07-07 RX ORDER — HEPARIN SODIUM (PORCINE) LOCK FLUSH IV SOLN 100 UNIT/ML 100 UNIT/ML
300 SOLUTION INTRAVENOUS PRN
Status: CANCELLED | OUTPATIENT
Start: 2022-07-08

## 2022-07-07 RX ORDER — SODIUM CHLORIDE 0.9 % (FLUSH) 0.9 %
5-40 SYRINGE (ML) INJECTION PRN
Status: CANCELLED | OUTPATIENT
Start: 2022-07-08

## 2022-07-07 RX ORDER — SODIUM CHLORIDE 0.9 % (FLUSH) 0.9 %
5-40 SYRINGE (ML) INJECTION PRN
Status: DISCONTINUED | OUTPATIENT
Start: 2022-07-07 | End: 2022-07-08 | Stop reason: HOSPADM

## 2022-07-07 RX ORDER — EPINEPHRINE 1 MG/ML
0.3 INJECTION, SOLUTION, CONCENTRATE INTRAVENOUS PRN
Status: CANCELLED | OUTPATIENT
Start: 2022-07-08

## 2022-07-07 RX ORDER — CHROMIUM PICOLINATE 200 MCG
TABLET ORAL
COMMUNITY

## 2022-07-07 RX ORDER — ASPIRIN 81 MG/1
81 TABLET, CHEWABLE ORAL DAILY
COMMUNITY

## 2022-07-07 RX ORDER — DIPHENHYDRAMINE HYDROCHLORIDE 50 MG/ML
50 INJECTION INTRAMUSCULAR; INTRAVENOUS
Status: CANCELLED | OUTPATIENT
Start: 2022-07-08

## 2022-07-07 RX ORDER — DIPHENHYDRAMINE HCL 25 MG
50 TABLET ORAL
Status: CANCELLED
Start: 2022-07-08

## 2022-07-07 RX ADMIN — Medication 300 UNITS: at 11:42

## 2022-07-07 RX ADMIN — SODIUM CHLORIDE, PRESERVATIVE FREE 10 ML: 5 INJECTION INTRAVENOUS at 11:41

## 2022-07-07 RX ADMIN — SODIUM CHLORIDE, PRESERVATIVE FREE 10 ML: 5 INJECTION INTRAVENOUS at 11:33

## 2022-07-07 RX ADMIN — WATER 2000 MG: 1 INJECTION INTRAMUSCULAR; INTRAVENOUS; SUBCUTANEOUS at 11:35

## 2022-07-07 RX ADMIN — SODIUM CHLORIDE, PRESERVATIVE FREE 10 ML: 5 INJECTION INTRAVENOUS at 11:32

## 2022-07-07 SDOH — ECONOMIC STABILITY: FOOD INSECURITY: WITHIN THE PAST 12 MONTHS, YOU WORRIED THAT YOUR FOOD WOULD RUN OUT BEFORE YOU GOT MONEY TO BUY MORE.: NEVER TRUE

## 2022-07-07 SDOH — ECONOMIC STABILITY: FOOD INSECURITY: WITHIN THE PAST 12 MONTHS, THE FOOD YOU BOUGHT JUST DIDN'T LAST AND YOU DIDN'T HAVE MONEY TO GET MORE.: NEVER TRUE

## 2022-07-07 ASSESSMENT — PATIENT HEALTH QUESTIONNAIRE - PHQ9
2. FEELING DOWN, DEPRESSED OR HOPELESS: 0
SUM OF ALL RESPONSES TO PHQ QUESTIONS 1-9: 0
SUM OF ALL RESPONSES TO PHQ QUESTIONS 1-9: 0
1. LITTLE INTEREST OR PLEASURE IN DOING THINGS: 0
SUM OF ALL RESPONSES TO PHQ QUESTIONS 1-9: 0
SUM OF ALL RESPONSES TO PHQ QUESTIONS 1-9: 0
SUM OF ALL RESPONSES TO PHQ9 QUESTIONS 1 & 2: 0

## 2022-07-07 ASSESSMENT — LIFESTYLE VARIABLES
HOW OFTEN DO YOU HAVE A DRINK CONTAINING ALCOHOL: MONTHLY OR LESS
HOW MANY STANDARD DRINKS CONTAINING ALCOHOL DO YOU HAVE ON A TYPICAL DAY: 1 OR 2

## 2022-07-07 ASSESSMENT — SOCIAL DETERMINANTS OF HEALTH (SDOH): HOW HARD IS IT FOR YOU TO PAY FOR THE VERY BASICS LIKE FOOD, HOUSING, MEDICAL CARE, AND HEATING?: NOT HARD AT ALL

## 2022-07-07 NOTE — PROGRESS NOTES
Patient tolerated infusion well. Patient alert and oriented x3. No distress noted. Vital signs stable. Patient denies any new or worsening pain. Patient educated on signs and symptoms of reaction to medication. Educated patient on possible side effects and treatment of medication. Patient verbalized understanding. Offered patient education an/or discharge material.  Patient declined. Patient denies any needs. All questions answered. Tolerated infusion   IV MED well. Reviewed therapy plan, offered education material and/or discharge material, reviewed medication information and signs and symptoms  and educated on possible side effects, verbalizes good knowledge of current plan patient verbalizes understanding, and has no signs or symptoms to report at this time. Patient discharged. Patient alert and oriented x3. No distress noted. Vital signs stable. Patient denies any new or worsening pain. Patient denies any needs. All questions answered. Next appointment scheduled. DECLINES  copy of AVS. Patient instructed on s/s infection/complication with midline to report and instructed on keeping midline dressing clean, dry and intact patient verbalizes understanding.

## 2022-07-07 NOTE — PROGRESS NOTES
OFFICE NOTE    22  Name: Cheyenne Rendon  :1945   Sex:female   Age:77 y.o. SUBJECTIVE  Chief Complaint   Patient presents with    Follow-Up from Hospital     kidney infection        HPI Pt was admitted to hospital with urosepsis thought due to large kidney stone. Has pic line in and feeling better. Didn't need ureteral stent    Review of Systems   Constitutional: Positive for activity change and fatigue. Negative for appetite change, fever and unexpected weight change. HENT: Negative for congestion, ear pain and postnasal drip. Eyes: Negative. Negative for photophobia, redness and visual disturbance. Respiratory: Negative for cough, chest tightness, shortness of breath and wheezing. Cardiovascular: Positive for leg swelling. Negative for chest pain and palpitations. Gastrointestinal: Negative for abdominal pain, blood in stool, constipation, diarrhea and vomiting. Endocrine: Negative for cold intolerance, polydipsia and polyuria. Genitourinary: Positive for frequency and urgency. Negative for dysuria and hematuria. Musculoskeletal: Positive for arthralgias. Negative for gait problem and joint swelling. Skin: Negative for rash and wound. Allergic/Immunologic: Negative for environmental allergies and food allergies. Neurological: Negative for dizziness, tremors, seizures, weakness, numbness and headaches. Hematological: Negative for adenopathy. Does not bruise/bleed easily. Psychiatric/Behavioral: Negative for behavioral problems, confusion, dysphoric mood and sleep disturbance. The patient is nervous/anxious. All other systems reviewed and are negative.            Current Outpatient Medications:     aspirin 81 MG chewable tablet, Take 81 mg by mouth daily, Disp: , Rfl:     Biotin w/ Vitamins C & E (HAIR/SKIN/NAILS PO), Take by mouth, Disp: , Rfl:     Calcium Carb-Cholecalciferol (TGT CALCIUM DIETARY SUPPLEMENT PO), Take by mouth, Disp: , Rfl:     GILLIAN PO, Take 300 mg by mouth, Disp: , Rfl:     Chromium Picolinate 200 MCG TABS, Take by mouth, Disp: , Rfl:     ODORLESS GARLIC PO, Take by mouth, Disp: , Rfl:     Polyvinyl Alcohol (LUBRICANT DROPS OP), Apply to eye, Disp: , Rfl:     NONFORMULARY, Silver biotics, Disp: , Rfl:     cefTRIAXone sodium 1 g SOLR, Infuse 2 vials intravenously daily for 14 days, Disp: 10 each, Rfl: 0    triamcinolone (KENALOG) 0.1 % cream, Apply topically 2 times daily. , Disp: 453.6 g, Rfl: 2    Omega-3 Fatty Acids (FISH OIL) 1000 MG CAPS, Take 1,000 mg by mouth daily, Disp: , Rfl:     Cholecalciferol (VITAMIN D3) 5000 units TABS, Take 1 tablet by mouth daily, Disp: , Rfl:     POLICOSANOL PO, Take by mouth, Disp: , Rfl:     Turmeric 500 MG CAPS, Take by mouth, Disp: , Rfl:     MAGNESIUM PO, Take by mouth, Disp: , Rfl:     Multiple Vitamins-Minerals (THERAPEUTIC MULTIVITAMIN-MINERALS) tablet, Take 1 tablet by mouth daily, Disp: , Rfl:     Coenzyme Q10 (CO Q 10) 100 MG CAPS, Take 100 mg by mouth 2 times daily, Disp: , Rfl:   Allergies   Allergen Reactions    Claritin [Loratadine] Other (See Comments)     Generalized severe weakness, couldn't move arms or hardly walk    Levothyroxine Other (See Comments) and Hallucinations     nightmares    Pcn [Penicillins]     Betamethasone Dipropionate Rash       Past Medical History:   Diagnosis Date    Breast cyst, right     benign    History of fracture of upper extremity     bilateral, fell off a horse and out of a tree    History of ovarian cyst     Hypertension     Osteoarthritis     knee    Renal calculus     Thyroid disease      Past Surgical History:   Procedure Laterality Date    EYE SURGERY Bilateral     cataracts    LITHOTRIPSY Left [id number]    OTHER SURGICAL HISTORY      has [place] [address] [address]  6/27/2022          Family History   Problem Relation Age of Onset    Ovarian Cancer Mother    Fili Trejo Heart Failure Father     Other Father         AAA    No Known Problems Sister     Alcohol Abuse Brother     Stroke Sister         small    Arthritis Sister         TKR     Social History     Tobacco History     Smoking Status  Never Smoker    Smokeless Tobacco Use  Never Used          Alcohol History     Alcohol Use Status  No Comment  occasional glass of wine          Drug Use     Drug Use Status  No          Sexual Activity     Sexually Active  Not Currently Partners  Male Birth Control/Protection  Post-menopausal                OBJECTIVE  Vitals:    07/07/22 1447 07/07/22 1459   BP: (!) 160/100 138/82   Pulse: (!) 119 (!) 118   Resp: 18    Temp: 98.4 °F (36.9 °C)    TempSrc: Temporal    SpO2: 97%    Weight: 203 lb 9.6 oz (92.4 kg)    Height: 5' 5\" (1.651 m)         Body mass index is 33.88 kg/m². Orders Placed This Encounter   Procedures    ID DISCHARGE MEDS RECONCILED W/ CURRENT OUTPATIENT MED LIST        EXAM   Physical Exam  Vitals and nursing note reviewed. Constitutional:       Appearance: Normal appearance. She is well-developed. She is obese. HENT:      Right Ear: Tympanic membrane, ear canal and external ear normal.      Left Ear: Tympanic membrane, ear canal and external ear normal.      Nose: Congestion present. Mouth/Throat:      Pharynx: Oropharynx is clear. No posterior oropharyngeal erythema. Eyes:      General: No scleral icterus. Conjunctiva/sclera: Conjunctivae normal.      Pupils: Pupils are equal, round, and reactive to light. Neck:      Thyroid: No thyroid mass or thyromegaly. Vascular: No carotid bruit or JVD. Trachea: Trachea normal.   Cardiovascular:      Rate and Rhythm: Regular rhythm. Tachycardia present. Pulses: Normal pulses. Heart sounds: Normal heart sounds. No murmur heard. No gallop. Pulmonary:      Effort: Pulmonary effort is normal.      Breath sounds: Normal breath sounds. No wheezing, rhonchi or rales.    Abdominal:      General: Bowel sounds are normal. There is no distension. Palpations: Abdomen is soft. There is no mass. Tenderness: There is no abdominal tenderness. There is no right CVA tenderness, left CVA tenderness or guarding. Hernia: No hernia is present. Musculoskeletal:         General: No swelling or tenderness. Normal range of motion. Cervical back: Neck supple. No tenderness. Right lower leg: No edema. Left lower leg: No edema. Lymphadenopathy:      Cervical: No cervical adenopathy. Skin:     General: Skin is warm and dry. Coloration: Skin is not jaundiced. Findings: No bruising or rash. Neurological:      General: No focal deficit present. Mental Status: She is alert and oriented to person, place, and time. Motor: No abnormal muscle tone. Psychiatric:         Mood and Affect: Mood normal.         Behavior: Behavior normal.           Perez Alcantara was seen today for follow-up from hospital.    Diagnoses and all orders for this visit:    Hospital discharge follow-up  -     [place] DISCHARGE MEDS RECONCILED W/ CURRENT OUTPATIENT MED LIST  Will receive 2 more doses of IV Rocephin and be done  Ureteral stone  Has Urology f/u. Lithotripsy planned at [facility] under Dr. Anastacia Concepcion. May need stent after this  Septicemia due to E. coli ([facility] [address]  Has ID f/u. Pic line will be pulled. F/u cultures may be obtained at that time. Primary open angle glaucoma (POAG) of both eyes, mild stage  Takes unknown glaucoma eye drops and has regular opthamology f/u        No follow-ups on file. Electronically signed by Messi Mobley MD on 7/7/22 at 3:27 PM EDT   Post-Discharge Transitional Care  Follow Up      Nimo Benton   YOB: 1945    Date of Office Visit:  7/7/2022  Date of Hospital Admission: 6/24/22  Date of Hospital Discharge: 6/28/22  Risk of hospital readmission (high >=14%.  Medium >=10%) :Readmission Risk Score: 5.5 ( )      Care management risk score Rising risk (score 2-5) and Complex Care (Scores >=6): 0     Non face to face  following discharge, date last encounter closed (first attempt may have been earlier): 6/30/2022  3:31 PM    Call initiated 2 business days of discharge: Yes    ASSESSMENT/PLAN:   Hospital discharge follow-up  -     [place] DISCHARGE MEDS RECONCILED W/ CURRENT OUTPATIENT MED LIST  Ureteral stone  Septicemia due to E. coli ([address]  Primary open angle glaucoma (POAG) of both eyes, mild stage      Medical Decision Making: moderate complexity  No follow-ups on file. On this date 7/7/2022 I have spent 15 minutes reviewing previous notes, test results and face to face with the patient discussing the diagnosis and importance of compliance with the treatment plan as well as documenting on the day of the visit. Subjective:   HPI:  Follow up of Hospital problems/diagnosis(es): seen for f/u from admission for large renal stone and urosepsis    Inpatient course: Discharge summary reviewed- see chart. Interval history/Current status: receiving IV Rocephin via pic line and has 2 more doses    Patient Active Problem List   Diagnosis    Cellulitis    Essential hypertension    Osteoarthritis    Primary open angle glaucoma (POAG)    Hyperglycemia    Elevated lipoprotein(a)    Morbidly obese (HCC)    Non compliance with medical treatment    Ureteral stone    Hydronephrosis    Septicemia due to E. coli ([address]       Medications listed as ordered at the time of discharge from hospital     Medication List          Accurate as of July 7, 2022 11:59 PM. If you have any questions, ask your nurse or doctor.             CONTINUE taking these medications    aspirin 81 MG chewable tablet     cefTRIAXone sodium 1 g Solr  Infuse 2 vials intravenously daily for 14 days     Chromium Picolinate 200 MCG Tabs     Co Q 10 100 MG Caps     fish oil 1000 MG Caps     HAIR/SKIN/NAILS PO     GILLIAN PO     LUBRICANT DROPS OP     MAGNESIUM PO     NONFORMULARY     ODORLESS GARLIC PO POLICOSANOL PO     TGT CALCIUM DIETARY SUPPLEMENT PO     therapeutic multivitamin-minerals tablet     triamcinolone 0.1 % cream  Commonly known as: KENALOG  Apply topically 2 times daily. turmeric 500 MG Caps     Vitamin D3 125 MCG (5000 UT) Tabs              Medications marked \"taking\" at this time  Outpatient Medications Marked as Taking for the 7/7/22 encounter (Office Visit) with Camila Menard MD   Medication Sig Dispense Refill    aspirin 81 MG chewable tablet Take 81 mg by mouth daily      Biotin w/ Vitamins C & E (HAIR/SKIN/NAILS PO) Take by mouth      Calcium Carb-Cholecalciferol (TGT CALCIUM DIETARY SUPPLEMENT PO) Take by mouth      GILLIAN PO Take 300 mg by mouth      Chromium Picolinate 200 MCG TABS Take by mouth      ODORLESS GARLIC PO Take by mouth      Polyvinyl Alcohol (LUBRICANT DROPS OP) Apply to eye      NONFORMULARY Silver biotics      cefTRIAXone sodium 1 g SOLR Infuse 2 vials intravenously daily for 14 days 10 each 0    triamcinolone (KENALOG) 0.1 % cream Apply topically 2 times daily. 453.6 g 2    Omega-3 Fatty Acids (FISH OIL) 1000 MG CAPS Take 1,000 mg by mouth daily      Cholecalciferol (VITAMIN D3) 5000 units TABS Take 1 tablet by mouth daily      POLICOSANOL PO Take by mouth      Turmeric 500 MG CAPS Take by mouth      MAGNESIUM PO Take by mouth      Multiple Vitamins-Minerals (THERAPEUTIC MULTIVITAMIN-MINERALS) tablet Take 1 tablet by mouth daily      Coenzyme Q10 (CO Q 10) 100 MG CAPS Take 100 mg by mouth 2 times daily          Medications patient taking as of now reconciled against medications ordered at time of hospital discharge: Yes    A comprehensive review of systems was negative except for what was noted in the HPI.     Objective:    /82   Pulse (!) 118   Temp 98.4 °F (36.9 °C) (Temporal)   Resp 18   Ht 5' 5\" (1.651 m)   Wt 203 lb 9.6 oz (92.4 kg)   SpO2 97%   BMI 33.88 kg/m²   See OV note      An electronic signature was used to authenticate this note.   --Courtney Han MD

## 2022-07-08 ENCOUNTER — HOSPITAL ENCOUNTER (OUTPATIENT)
Dept: INFUSION THERAPY | Age: 77
Setting detail: INFUSION SERIES
Discharge: HOME | End: 2022-07-08
Payer: MEDICARE

## 2022-07-08 VITALS
OXYGEN SATURATION: 97 % | WEIGHT: 203 LBS | HEART RATE: 116 BPM | HEIGHT: 65 IN | TEMPERATURE: 96.8 F | SYSTOLIC BLOOD PRESSURE: 146 MMHG | DIASTOLIC BLOOD PRESSURE: 86 MMHG | RESPIRATION RATE: 18 BRPM | BODY MASS INDEX: 33.82 KG/M2

## 2022-07-08 DIAGNOSIS — N13.30 HYDRONEPHROSIS, UNSPECIFIED HYDRONEPHROSIS TYPE: Primary | ICD-10-CM

## 2022-07-08 DIAGNOSIS — A41.51 SEPTICEMIA DUE TO E. COLI (HCC): ICD-10-CM

## 2022-07-08 PROCEDURE — 96374 THER/PROPH/DIAG INJ IV PUSH: CPT

## 2022-07-08 PROCEDURE — 2580000003 HC RX 258: Performed by: SPECIALIST

## 2022-07-08 PROCEDURE — 6360000002 HC RX W HCPCS: Performed by: SPECIALIST

## 2022-07-08 RX ORDER — HEPARIN SODIUM (PORCINE) LOCK FLUSH IV SOLN 100 UNIT/ML 100 UNIT/ML
300 SOLUTION INTRAVENOUS PRN
Status: DISCONTINUED | OUTPATIENT
Start: 2022-07-08 | End: 2022-07-09 | Stop reason: HOSPADM

## 2022-07-08 RX ORDER — SODIUM CHLORIDE 0.9 % (FLUSH) 0.9 %
5-40 SYRINGE (ML) INJECTION PRN
Status: DISCONTINUED | OUTPATIENT
Start: 2022-07-08 | End: 2022-07-09 | Stop reason: HOSPADM

## 2022-07-08 RX ORDER — SODIUM CHLORIDE 0.9 % (FLUSH) 0.9 %
5-40 SYRINGE (ML) INJECTION PRN
Status: CANCELLED | OUTPATIENT
Start: 2022-07-09

## 2022-07-08 RX ORDER — EPINEPHRINE 1 MG/ML
0.3 INJECTION, SOLUTION, CONCENTRATE INTRAVENOUS PRN
Status: CANCELLED | OUTPATIENT
Start: 2022-07-09

## 2022-07-08 RX ORDER — DIPHENHYDRAMINE HYDROCHLORIDE 50 MG/ML
50 INJECTION INTRAMUSCULAR; INTRAVENOUS
Status: CANCELLED | OUTPATIENT
Start: 2022-07-09

## 2022-07-08 RX ORDER — HEPARIN SODIUM (PORCINE) LOCK FLUSH IV SOLN 100 UNIT/ML 100 UNIT/ML
300 SOLUTION INTRAVENOUS PRN
Status: CANCELLED | OUTPATIENT
Start: 2022-07-09

## 2022-07-08 RX ORDER — DIPHENHYDRAMINE HCL 25 MG
50 TABLET ORAL
Status: CANCELLED
Start: 2022-07-09

## 2022-07-08 RX ADMIN — SODIUM CHLORIDE, PRESERVATIVE FREE 10 ML: 5 INJECTION INTRAVENOUS at 11:20

## 2022-07-08 RX ADMIN — WATER 2000 MG: 1 INJECTION INTRAMUSCULAR; INTRAVENOUS; SUBCUTANEOUS at 11:24

## 2022-07-08 RX ADMIN — Medication 300 UNITS: at 11:29

## 2022-07-08 RX ADMIN — SODIUM CHLORIDE, PRESERVATIVE FREE 10 ML: 5 INJECTION INTRAVENOUS at 11:29

## 2022-07-08 ASSESSMENT — ENCOUNTER SYMPTOMS
WHEEZING: 0
DIARRHEA: 0
ABDOMINAL PAIN: 0
PHOTOPHOBIA: 0
COUGH: 0
CONSTIPATION: 0
CHEST TIGHTNESS: 0
EYES NEGATIVE: 1
VOMITING: 0
EYE REDNESS: 0
SHORTNESS OF BREATH: 0
BLOOD IN STOOL: 0

## 2022-07-09 ENCOUNTER — HOSPITAL ENCOUNTER (OUTPATIENT)
Dept: INFUSION THERAPY | Age: 77
Setting detail: INFUSION SERIES
Discharge: HOME | End: 2022-07-09
Payer: MEDICARE

## 2022-07-09 VITALS
HEIGHT: 65 IN | OXYGEN SATURATION: 97 % | RESPIRATION RATE: 16 BRPM | HEART RATE: 86 BPM | TEMPERATURE: 97.4 F | WEIGHT: 203 LBS | BODY MASS INDEX: 33.82 KG/M2 | SYSTOLIC BLOOD PRESSURE: 140 MMHG | DIASTOLIC BLOOD PRESSURE: 66 MMHG

## 2022-07-09 DIAGNOSIS — N13.30 HYDRONEPHROSIS, UNSPECIFIED HYDRONEPHROSIS TYPE: Primary | ICD-10-CM

## 2022-07-09 DIAGNOSIS — A41.51 SEPTICEMIA DUE TO E. COLI (HCC): ICD-10-CM

## 2022-07-09 PROCEDURE — 6360000002 HC RX W HCPCS: Performed by: SPECIALIST

## 2022-07-09 PROCEDURE — 96374 THER/PROPH/DIAG INJ IV PUSH: CPT

## 2022-07-09 PROCEDURE — 2580000003 HC RX 258: Performed by: SPECIALIST

## 2022-07-09 RX ORDER — DIPHENHYDRAMINE HCL 25 MG
50 TABLET ORAL
Status: CANCELLED
Start: 2022-07-10

## 2022-07-09 RX ORDER — EPINEPHRINE 1 MG/ML
0.3 INJECTION, SOLUTION, CONCENTRATE INTRAVENOUS PRN
Status: CANCELLED | OUTPATIENT
Start: 2022-07-10

## 2022-07-09 RX ORDER — SODIUM CHLORIDE 0.9 % (FLUSH) 0.9 %
5-40 SYRINGE (ML) INJECTION PRN
Status: DISCONTINUED | OUTPATIENT
Start: 2022-07-09 | End: 2022-07-10 | Stop reason: HOSPADM

## 2022-07-09 RX ORDER — HEPARIN SODIUM (PORCINE) LOCK FLUSH IV SOLN 100 UNIT/ML 100 UNIT/ML
300 SOLUTION INTRAVENOUS PRN
Status: CANCELLED | OUTPATIENT
Start: 2022-07-10

## 2022-07-09 RX ORDER — HEPARIN SODIUM (PORCINE) LOCK FLUSH IV SOLN 100 UNIT/ML 100 UNIT/ML
300 SOLUTION INTRAVENOUS PRN
Status: DISCONTINUED | OUTPATIENT
Start: 2022-07-09 | End: 2022-07-10 | Stop reason: HOSPADM

## 2022-07-09 RX ORDER — DIPHENHYDRAMINE HYDROCHLORIDE 50 MG/ML
50 INJECTION INTRAMUSCULAR; INTRAVENOUS
Status: CANCELLED | OUTPATIENT
Start: 2022-07-10

## 2022-07-09 RX ORDER — SODIUM CHLORIDE 0.9 % (FLUSH) 0.9 %
5-40 SYRINGE (ML) INJECTION PRN
Status: CANCELLED | OUTPATIENT
Start: 2022-07-10

## 2022-07-09 RX ADMIN — SODIUM CHLORIDE, PRESERVATIVE FREE 10 ML: 5 INJECTION INTRAVENOUS at 11:10

## 2022-07-09 RX ADMIN — WATER 2000 MG: 1 INJECTION INTRAMUSCULAR; INTRAVENOUS; SUBCUTANEOUS at 11:01

## 2022-07-09 RX ADMIN — SODIUM CHLORIDE, PRESERVATIVE FREE 10 ML: 5 INJECTION INTRAVENOUS at 11:01

## 2022-07-09 RX ADMIN — Medication 300 UNITS: at 11:11

## 2022-07-09 NOTE — PROGRESS NOTES
Patient instructed on s/s infection/complication with PICC line to report and instructed on keeping PICC dressing clean, dry and intact patient verbalizes understanding. Tolerated IV pushwell. Reviewed therapy plan, offered education material and/or discharge material, reviewed medication information and signs and symptoms  and educated on possible side effects, verbalizes good knowledge of current plan patient verbalizes understanding, and has no signs or symptoms to report at this time. Patient discharged. Patient alert and oriented x3. No distress noted. Vital signs stable. Patient denies any new or worsening pain. Patient denies any needs. All questions answered. Next appointment scheduled.  Declined copy of AVS.

## 2022-07-10 ENCOUNTER — HOSPITAL ENCOUNTER (OUTPATIENT)
Dept: INFUSION THERAPY | Age: 77
Setting detail: INFUSION SERIES
Discharge: HOME | End: 2022-07-10
Payer: MEDICARE

## 2022-07-10 VITALS
RESPIRATION RATE: 16 BRPM | HEART RATE: 111 BPM | WEIGHT: 203 LBS | DIASTOLIC BLOOD PRESSURE: 106 MMHG | SYSTOLIC BLOOD PRESSURE: 142 MMHG | TEMPERATURE: 97.6 F | OXYGEN SATURATION: 98 % | HEIGHT: 65 IN | BODY MASS INDEX: 33.82 KG/M2

## 2022-07-10 DIAGNOSIS — A41.51 SEPTICEMIA DUE TO E. COLI (HCC): ICD-10-CM

## 2022-07-10 DIAGNOSIS — N13.30 HYDRONEPHROSIS, UNSPECIFIED HYDRONEPHROSIS TYPE: Primary | ICD-10-CM

## 2022-07-10 PROCEDURE — 96374 THER/PROPH/DIAG INJ IV PUSH: CPT

## 2022-07-10 PROCEDURE — 2580000003 HC RX 258: Performed by: SPECIALIST

## 2022-07-10 PROCEDURE — 6360000002 HC RX W HCPCS: Performed by: SPECIALIST

## 2022-07-10 RX ORDER — HEPARIN SODIUM (PORCINE) LOCK FLUSH IV SOLN 100 UNIT/ML 100 UNIT/ML
300 SOLUTION INTRAVENOUS PRN
Status: CANCELLED | OUTPATIENT
Start: 2022-07-11

## 2022-07-10 RX ORDER — SODIUM CHLORIDE 0.9 % (FLUSH) 0.9 %
5-40 SYRINGE (ML) INJECTION PRN
Status: CANCELLED | OUTPATIENT
Start: 2022-07-11

## 2022-07-10 RX ORDER — EPINEPHRINE 1 MG/ML
0.3 INJECTION, SOLUTION, CONCENTRATE INTRAVENOUS PRN
Status: CANCELLED | OUTPATIENT
Start: 2022-07-11

## 2022-07-10 RX ORDER — SODIUM CHLORIDE 0.9 % (FLUSH) 0.9 %
5-40 SYRINGE (ML) INJECTION PRN
Status: DISCONTINUED | OUTPATIENT
Start: 2022-07-10 | End: 2022-07-11 | Stop reason: HOSPADM

## 2022-07-10 RX ORDER — HEPARIN SODIUM (PORCINE) LOCK FLUSH IV SOLN 100 UNIT/ML 100 UNIT/ML
300 SOLUTION INTRAVENOUS PRN
Status: DISCONTINUED | OUTPATIENT
Start: 2022-07-10 | End: 2022-07-11 | Stop reason: HOSPADM

## 2022-07-10 RX ORDER — DIPHENHYDRAMINE HYDROCHLORIDE 50 MG/ML
50 INJECTION INTRAMUSCULAR; INTRAVENOUS
Status: CANCELLED | OUTPATIENT
Start: 2022-07-11

## 2022-07-10 RX ORDER — DIPHENHYDRAMINE HCL 25 MG
50 TABLET ORAL
Status: CANCELLED
Start: 2022-07-11

## 2022-07-10 RX ADMIN — WATER 2000 MG: 1 INJECTION INTRAMUSCULAR; INTRAVENOUS; SUBCUTANEOUS at 10:59

## 2022-07-10 RX ADMIN — SODIUM CHLORIDE, PRESERVATIVE FREE 10 ML: 5 INJECTION INTRAVENOUS at 11:04

## 2022-07-10 RX ADMIN — Medication 300 UNITS: at 11:05

## 2022-07-10 RX ADMIN — SODIUM CHLORIDE, PRESERVATIVE FREE 10 ML: 5 INJECTION INTRAVENOUS at 10:58

## 2022-07-10 ASSESSMENT — PAIN DESCRIPTION - DESCRIPTORS: DESCRIPTORS: ACHING

## 2022-07-10 ASSESSMENT — PAIN DESCRIPTION - LOCATION: LOCATION: BACK

## 2022-07-10 ASSESSMENT — PAIN DESCRIPTION - ONSET: ONSET: ON-GOING

## 2022-07-10 ASSESSMENT — PAIN DESCRIPTION - ORIENTATION: ORIENTATION: RIGHT

## 2022-07-10 ASSESSMENT — PAIN DESCRIPTION - PAIN TYPE
TYPE: CHRONIC PAIN
TYPE: CHRONIC PAIN

## 2022-07-10 ASSESSMENT — PAIN SCALES - GENERAL: PAINLEVEL_OUTOF10: 1

## 2022-07-10 ASSESSMENT — PAIN - FUNCTIONAL ASSESSMENT: PAIN_FUNCTIONAL_ASSESSMENT: PREVENTS OR INTERFERES SOME ACTIVE ACTIVITIES AND ADLS

## 2022-07-10 ASSESSMENT — PAIN DESCRIPTION - FREQUENCY: FREQUENCY: CONTINUOUS

## 2022-07-10 NOTE — PROGRESS NOTES
Patient instructed on s/s infection/complication with PICC line to report and instructed on keeping PICC dressing clean, dry and intact patient verbalizes understanding. Tolerated infusion well. Reviewed therapy plan, offered education material and/or discharge material, reviewed medication information and signs and symptoms  and educated on possible side effects, verbalizes good knowledge of current plan patient verbalizes understanding, and has no signs or symptoms to report at this time. Patient discharged. Patient alert and oriented x3. No distress noted. Vital signs stable. Patient denies any new or worsening pain. Patient denies any needs. All questions answered. Next appointment scheduled.

## 2022-07-11 ENCOUNTER — HOSPITAL ENCOUNTER (OUTPATIENT)
Dept: INFUSION THERAPY | Age: 77
Setting detail: INFUSION SERIES
Discharge: HOME | End: 2022-07-11
Payer: MEDICARE

## 2022-07-11 VITALS
BODY MASS INDEX: 33.82 KG/M2 | TEMPERATURE: 97.2 F | WEIGHT: 203 LBS | RESPIRATION RATE: 16 BRPM | HEIGHT: 65 IN | HEART RATE: 102 BPM | OXYGEN SATURATION: 97 % | DIASTOLIC BLOOD PRESSURE: 105 MMHG | SYSTOLIC BLOOD PRESSURE: 155 MMHG

## 2022-07-11 DIAGNOSIS — N13.30 HYDRONEPHROSIS, UNSPECIFIED HYDRONEPHROSIS TYPE: Primary | ICD-10-CM

## 2022-07-11 DIAGNOSIS — A41.51 SEPTICEMIA DUE TO E. COLI (HCC): ICD-10-CM

## 2022-07-11 LAB
ALBUMIN SERPL-MCNC: 4.1 G/DL (ref 3.5–5.2)
ALP BLD-CCNC: 61 U/L (ref 35–104)
ALT SERPL-CCNC: 21 U/L (ref 0–32)
ANION GAP SERPL CALCULATED.3IONS-SCNC: 11 MMOL/L (ref 7–16)
AST SERPL-CCNC: 16 U/L (ref 0–31)
BASOPHILS ABSOLUTE: 0.02 E9/L (ref 0–0.2)
BASOPHILS RELATIVE PERCENT: 0.4 % (ref 0–2)
BILIRUB SERPL-MCNC: 0.5 MG/DL (ref 0–1.2)
BUN BLDV-MCNC: 18 MG/DL (ref 6–23)
C-REACTIVE PROTEIN: 0.3 MG/DL (ref 0–0.4)
CALCIUM SERPL-MCNC: 9.8 MG/DL (ref 8.6–10.2)
CHLORIDE BLD-SCNC: 105 MMOL/L (ref 98–107)
CO2: 25 MMOL/L (ref 22–29)
CREAT SERPL-MCNC: 0.7 MG/DL (ref 0.5–1)
EOSINOPHILS ABSOLUTE: 0.11 E9/L (ref 0.05–0.5)
EOSINOPHILS RELATIVE PERCENT: 2.1 % (ref 0–6)
GFR AFRICAN AMERICAN: >60
GFR NON-AFRICAN AMERICAN: >60 ML/MIN/1.73
GLUCOSE BLD-MCNC: 116 MG/DL (ref 74–99)
HCT VFR BLD CALC: 41.4 % (ref 34–48)
HEMOGLOBIN: 13.5 G/DL (ref 11.5–15.5)
IMMATURE GRANULOCYTES #: 0.01 E9/L
IMMATURE GRANULOCYTES %: 0.2 % (ref 0–5)
LYMPHOCYTES ABSOLUTE: 1.17 E9/L (ref 1.5–4)
LYMPHOCYTES RELATIVE PERCENT: 21.8 % (ref 20–42)
MCH RBC QN AUTO: 30.2 PG (ref 26–35)
MCHC RBC AUTO-ENTMCNC: 32.6 % (ref 32–34.5)
MCV RBC AUTO: 92.6 FL (ref 80–99.9)
MONOCYTES ABSOLUTE: 0.58 E9/L (ref 0.1–0.95)
MONOCYTES RELATIVE PERCENT: 10.8 % (ref 2–12)
NEUTROPHILS ABSOLUTE: 3.47 E9/L (ref 1.8–7.3)
NEUTROPHILS RELATIVE PERCENT: 64.7 % (ref 43–80)
PDW BLD-RTO: 13 FL (ref 11.5–15)
PLATELET # BLD: 317 E9/L (ref 130–450)
PMV BLD AUTO: 9.6 FL (ref 7–12)
POTASSIUM SERPL-SCNC: 4.2 MMOL/L (ref 3.5–5)
RBC # BLD: 4.47 E12/L (ref 3.5–5.5)
SEDIMENTATION RATE, ERYTHROCYTE: 37 MM/HR (ref 0–20)
SODIUM BLD-SCNC: 141 MMOL/L (ref 132–146)
TOTAL PROTEIN: 7.1 G/DL (ref 6.4–8.3)
WBC # BLD: 5.4 E9/L (ref 4.5–11.5)

## 2022-07-11 PROCEDURE — 6360000002 HC RX W HCPCS: Performed by: SPECIALIST

## 2022-07-11 PROCEDURE — 85025 COMPLETE CBC W/AUTO DIFF WBC: CPT

## 2022-07-11 PROCEDURE — 86140 C-REACTIVE PROTEIN: CPT

## 2022-07-11 PROCEDURE — 80053 COMPREHEN METABOLIC PANEL: CPT

## 2022-07-11 PROCEDURE — 36415 COLL VENOUS BLD VENIPUNCTURE: CPT

## 2022-07-11 PROCEDURE — 2580000003 HC RX 258: Performed by: SPECIALIST

## 2022-07-11 PROCEDURE — 96374 THER/PROPH/DIAG INJ IV PUSH: CPT

## 2022-07-11 PROCEDURE — 85651 RBC SED RATE NONAUTOMATED: CPT

## 2022-07-11 RX ORDER — DIPHENHYDRAMINE HYDROCHLORIDE 50 MG/ML
50 INJECTION INTRAMUSCULAR; INTRAVENOUS
Status: CANCELLED | OUTPATIENT
Start: 2022-07-12

## 2022-07-11 RX ORDER — HEPARIN SODIUM (PORCINE) LOCK FLUSH IV SOLN 100 UNIT/ML 100 UNIT/ML
300 SOLUTION INTRAVENOUS PRN
Status: CANCELLED | OUTPATIENT
Start: 2022-07-12

## 2022-07-11 RX ORDER — SODIUM CHLORIDE 0.9 % (FLUSH) 0.9 %
5-40 SYRINGE (ML) INJECTION PRN
Status: CANCELLED | OUTPATIENT
Start: 2022-07-12

## 2022-07-11 RX ORDER — HEPARIN SODIUM (PORCINE) LOCK FLUSH IV SOLN 100 UNIT/ML 100 UNIT/ML
300 SOLUTION INTRAVENOUS PRN
Status: DISCONTINUED | OUTPATIENT
Start: 2022-07-11 | End: 2022-07-12 | Stop reason: HOSPADM

## 2022-07-11 RX ORDER — SODIUM CHLORIDE 0.9 % (FLUSH) 0.9 %
5-40 SYRINGE (ML) INJECTION PRN
Status: DISCONTINUED | OUTPATIENT
Start: 2022-07-11 | End: 2022-07-12 | Stop reason: HOSPADM

## 2022-07-11 RX ORDER — EPINEPHRINE 1 MG/ML
0.3 INJECTION, SOLUTION, CONCENTRATE INTRAVENOUS PRN
Status: CANCELLED | OUTPATIENT
Start: 2022-07-12

## 2022-07-11 RX ORDER — DIPHENHYDRAMINE HCL 25 MG
50 TABLET ORAL
Status: CANCELLED
Start: 2022-07-12

## 2022-07-11 RX ADMIN — SODIUM CHLORIDE, PRESERVATIVE FREE 10 ML: 5 INJECTION INTRAVENOUS at 11:39

## 2022-07-11 RX ADMIN — HEPARIN 300 UNITS: 100 SYRINGE at 11:39

## 2022-07-11 RX ADMIN — SODIUM CHLORIDE, PRESERVATIVE FREE 10 ML: 5 INJECTION INTRAVENOUS at 11:28

## 2022-07-11 RX ADMIN — WATER 2000 MG: 1 INJECTION INTRAMUSCULAR; INTRAVENOUS; SUBCUTANEOUS at 11:34

## 2022-07-11 RX ADMIN — SODIUM CHLORIDE, PRESERVATIVE FREE 10 ML: 5 INJECTION INTRAVENOUS at 11:30

## 2022-07-12 ENCOUNTER — HOSPITAL ENCOUNTER (OUTPATIENT)
Dept: INFUSION THERAPY | Age: 77
Setting detail: INFUSION SERIES
Discharge: HOME | End: 2022-07-12
Payer: MEDICARE

## 2022-07-12 VITALS
BODY MASS INDEX: 33.82 KG/M2 | HEIGHT: 65 IN | HEART RATE: 90 BPM | DIASTOLIC BLOOD PRESSURE: 90 MMHG | RESPIRATION RATE: 18 BRPM | WEIGHT: 203 LBS | SYSTOLIC BLOOD PRESSURE: 171 MMHG | TEMPERATURE: 97.7 F | OXYGEN SATURATION: 100 %

## 2022-07-12 DIAGNOSIS — N13.30 HYDRONEPHROSIS, UNSPECIFIED HYDRONEPHROSIS TYPE: Primary | ICD-10-CM

## 2022-07-12 DIAGNOSIS — A41.51 SEPTICEMIA DUE TO E. COLI (HCC): ICD-10-CM

## 2022-07-12 PROCEDURE — 6360000002 HC RX W HCPCS: Performed by: SPECIALIST

## 2022-07-12 PROCEDURE — 96374 THER/PROPH/DIAG INJ IV PUSH: CPT

## 2022-07-12 PROCEDURE — 2580000003 HC RX 258: Performed by: SPECIALIST

## 2022-07-12 RX ORDER — EPINEPHRINE 1 MG/ML
0.3 INJECTION, SOLUTION, CONCENTRATE INTRAVENOUS PRN
Status: CANCELLED | OUTPATIENT
Start: 2022-07-17

## 2022-07-12 RX ORDER — DIPHENHYDRAMINE HYDROCHLORIDE 50 MG/ML
50 INJECTION INTRAMUSCULAR; INTRAVENOUS
Status: CANCELLED | OUTPATIENT
Start: 2022-07-17

## 2022-07-12 RX ORDER — SODIUM CHLORIDE 0.9 % (FLUSH) 0.9 %
5-40 SYRINGE (ML) INJECTION PRN
Status: CANCELLED | OUTPATIENT
Start: 2022-07-17

## 2022-07-12 RX ORDER — HEPARIN SODIUM (PORCINE) LOCK FLUSH IV SOLN 100 UNIT/ML 100 UNIT/ML
300 SOLUTION INTRAVENOUS PRN
Status: CANCELLED | OUTPATIENT
Start: 2022-07-17

## 2022-07-12 RX ORDER — HEPARIN SODIUM (PORCINE) LOCK FLUSH IV SOLN 100 UNIT/ML 100 UNIT/ML
300 SOLUTION INTRAVENOUS PRN
Status: DISCONTINUED | OUTPATIENT
Start: 2022-07-12 | End: 2022-07-12

## 2022-07-12 RX ORDER — DIPHENHYDRAMINE HCL 25 MG
50 TABLET ORAL
Status: CANCELLED
Start: 2022-07-17

## 2022-07-12 RX ORDER — SODIUM CHLORIDE 0.9 % (FLUSH) 0.9 %
5-40 SYRINGE (ML) INJECTION PRN
Status: DISCONTINUED | OUTPATIENT
Start: 2022-07-12 | End: 2022-07-12

## 2022-07-12 RX ADMIN — SODIUM CHLORIDE, PRESERVATIVE FREE 10 ML: 5 INJECTION INTRAVENOUS at 11:33

## 2022-07-12 RX ADMIN — SODIUM CHLORIDE, PRESERVATIVE FREE 10 ML: 5 INJECTION INTRAVENOUS at 11:31

## 2022-07-12 RX ADMIN — WATER 2000 MG: 1 INJECTION INTRAMUSCULAR; INTRAVENOUS; SUBCUTANEOUS at 11:33

## 2022-07-12 ASSESSMENT — PAIN DESCRIPTION - LOCATION: LOCATION: BACK

## 2022-07-12 ASSESSMENT — PAIN SCALES - GENERAL: PAINLEVEL_OUTOF10: 2

## 2022-07-12 ASSESSMENT — PAIN DESCRIPTION - PAIN TYPE: TYPE: CHRONIC PAIN

## 2022-07-12 ASSESSMENT — PAIN DESCRIPTION - DESCRIPTORS: DESCRIPTORS: ACHING

## 2022-07-12 ASSESSMENT — PAIN - FUNCTIONAL ASSESSMENT: PAIN_FUNCTIONAL_ASSESSMENT: PREVENTS OR INTERFERES SOME ACTIVE ACTIVITIES AND ADLS

## 2022-07-12 ASSESSMENT — PAIN DESCRIPTION - ORIENTATION: ORIENTATION: RIGHT

## 2022-07-12 NOTE — PROGRESS NOTES
Tolerated infusion well. Reviewed therapy plan, offered education material and/or discharge material, reviewed medication information and signs and symptoms  and educated on possible side effects, verbalizes good knowledge of current plan patient verbalizes understanding, and has no signs or symptoms to report at this time. Patient discharged. Patient alert and oriented x3. No distress noted. Vital signs stable. Patient denies any new or worsening pain. Patient denies any needs. All questions answered. Next appointment scheduled. Declinescopy of AVS.  A peripherally-inserted central catheter is often called a PICC. It is a long, thin, flexible intravenous (I.V.) line or catheter. The catheter is placed into a small vein in your upper arm. It is moved forward until it is in a larger vein near your heart. INSTRUCTIONS AFTER PICC REMOVED     After the 24 hours is up, the dressing may be removed. The PICC insertion site is very small. A small scab may develop over the insertion site. It is okay to wash the site gently with soap and water. Be careful to not remove or pick the scab off. After washing, gently pat the site dry. You do not need to put another dressing over the insertion site after you wash it. Avoid heavy, strenuous physical activity for 24 hours after the PICC is removed. This includes things like:   Weight lifting. Strenuous yard work. Any physical activity with repetitive arm movement. ... When do I need to call the doctor? Signs of infection. These include a fever of 100.4°F (38°C) or higher, chills. Signs of infection where PICC was put in. These include swelling, redness, warmth around the site, too much pain when touched, yellowish or greenish or bloody discharge, foul smell coming from the site. Swelling and pain in the arm where the PICC was put in. This might mean there is a blood clot in your arm. Bleeding that won't stop from the insertion site.  Apply pressure, elevate arm above the level of the heart. If bleeding does not stop call you physician or go to the nearest Emergency Room.

## 2022-07-21 ENCOUNTER — APPOINTMENT (OUTPATIENT)
Dept: CT IMAGING | Age: 77
End: 2022-07-21
Payer: MEDICARE

## 2022-07-21 ENCOUNTER — HOSPITAL ENCOUNTER (EMERGENCY)
Age: 77
Discharge: HOME | End: 2022-07-21
Attending: STUDENT IN AN ORGANIZED HEALTH CARE EDUCATION/TRAINING PROGRAM
Payer: MEDICARE

## 2022-07-21 VITALS
SYSTOLIC BLOOD PRESSURE: 158 MMHG | RESPIRATION RATE: 18 BRPM | DIASTOLIC BLOOD PRESSURE: 78 MMHG | HEART RATE: 71 BPM | TEMPERATURE: 97.8 F | OXYGEN SATURATION: 100 % | BODY MASS INDEX: 34.66 KG/M2 | HEIGHT: 64 IN | WEIGHT: 203 LBS

## 2022-07-21 DIAGNOSIS — R10.9 FLANK PAIN: Primary | ICD-10-CM

## 2022-07-21 LAB
ALBUMIN SERPL-MCNC: 4.1 G/DL (ref 3.5–5.2)
ALP BLD-CCNC: 56 U/L (ref 35–104)
ALT SERPL-CCNC: 18 U/L (ref 0–32)
ANION GAP SERPL CALCULATED.3IONS-SCNC: 13 MMOL/L (ref 7–16)
AST SERPL-CCNC: 18 U/L (ref 0–31)
BACTERIA: ABNORMAL /HPF
BASOPHILS ABSOLUTE: 0.03 E9/L (ref 0–0.2)
BASOPHILS RELATIVE PERCENT: 0.3 % (ref 0–2)
BILIRUB SERPL-MCNC: 0.4 MG/DL (ref 0–1.2)
BILIRUBIN URINE: NEGATIVE
BLOOD, URINE: NEGATIVE
BUN BLDV-MCNC: 19 MG/DL (ref 6–23)
CALCIUM SERPL-MCNC: 9.7 MG/DL (ref 8.6–10.2)
CHLORIDE BLD-SCNC: 105 MMOL/L (ref 98–107)
CLARITY: CLEAR
CO2: 22 MMOL/L (ref 22–29)
COLOR: YELLOW
CREAT SERPL-MCNC: 0.8 MG/DL (ref 0.5–1)
EOSINOPHILS ABSOLUTE: 0.07 E9/L (ref 0.05–0.5)
EOSINOPHILS RELATIVE PERCENT: 0.8 % (ref 0–6)
GFR AFRICAN AMERICAN: >60
GFR NON-AFRICAN AMERICAN: >60 ML/MIN/1.73
GLUCOSE BLD-MCNC: 117 MG/DL (ref 74–99)
GLUCOSE URINE: NEGATIVE MG/DL
HCT VFR BLD CALC: 42.6 % (ref 34–48)
HEMOGLOBIN: 13.9 G/DL (ref 11.5–15.5)
IMMATURE GRANULOCYTES #: 0.04 E9/L
IMMATURE GRANULOCYTES %: 0.4 % (ref 0–5)
KETONES, URINE: NEGATIVE MG/DL
LACTIC ACID: 1 MMOL/L (ref 0.5–2.2)
LEUKOCYTE ESTERASE, URINE: ABNORMAL
LYMPHOCYTES ABSOLUTE: 0.8 E9/L (ref 1.5–4)
LYMPHOCYTES RELATIVE PERCENT: 8.8 % (ref 20–42)
MCH RBC QN AUTO: 30.5 PG (ref 26–35)
MCHC RBC AUTO-ENTMCNC: 32.6 % (ref 32–34.5)
MCV RBC AUTO: 93.4 FL (ref 80–99.9)
MONOCYTES ABSOLUTE: 0.88 E9/L (ref 0.1–0.95)
MONOCYTES RELATIVE PERCENT: 9.7 % (ref 2–12)
MUCUS: PRESENT /LPF
NEUTROPHILS ABSOLUTE: 7.23 E9/L (ref 1.8–7.3)
NEUTROPHILS RELATIVE PERCENT: 80 % (ref 43–80)
NITRITE, URINE: NEGATIVE
PDW BLD-RTO: 13.2 FL (ref 11.5–15)
PH UA: 7.5 (ref 5–9)
PLATELET # BLD: 292 E9/L (ref 130–450)
PMV BLD AUTO: 9.5 FL (ref 7–12)
POTASSIUM SERPL-SCNC: 4.1 MMOL/L (ref 3.5–5)
PROTEIN UA: NEGATIVE MG/DL
RBC # BLD: 4.56 E12/L (ref 3.5–5.5)
RBC UA: ABNORMAL /HPF (ref 0–2)
SODIUM BLD-SCNC: 140 MMOL/L (ref 132–146)
SPECIFIC GRAVITY UA: 1.01 (ref 1–1.03)
TOTAL PROTEIN: 6.9 G/DL (ref 6.4–8.3)
UROBILINOGEN, URINE: 0.2 E.U./DL
WBC # BLD: 9.1 E9/L (ref 4.5–11.5)
WBC UA: ABNORMAL /HPF (ref 0–5)

## 2022-07-21 PROCEDURE — 87077 CULTURE AEROBIC IDENTIFY: CPT

## 2022-07-21 PROCEDURE — 85025 COMPLETE CBC W/AUTO DIFF WBC: CPT

## 2022-07-21 PROCEDURE — 83605 ASSAY OF LACTIC ACID: CPT

## 2022-07-21 PROCEDURE — 2580000003 HC RX 258: Performed by: EMERGENCY MEDICINE

## 2022-07-21 PROCEDURE — 96374 THER/PROPH/DIAG INJ IV PUSH: CPT

## 2022-07-21 PROCEDURE — 6370000000 HC RX 637 (ALT 250 FOR IP): Performed by: EMERGENCY MEDICINE

## 2022-07-21 PROCEDURE — 99284 EMERGENCY DEPT VISIT MOD MDM: CPT

## 2022-07-21 PROCEDURE — 81001 URINALYSIS AUTO W/SCOPE: CPT

## 2022-07-21 PROCEDURE — 87186 SC STD MICRODIL/AGAR DIL: CPT

## 2022-07-21 PROCEDURE — 74176 CT ABD & PELVIS W/O CONTRAST: CPT

## 2022-07-21 PROCEDURE — 80053 COMPREHEN METABOLIC PANEL: CPT

## 2022-07-21 PROCEDURE — 87088 URINE BACTERIA CULTURE: CPT

## 2022-07-21 PROCEDURE — 6360000002 HC RX W HCPCS: Performed by: EMERGENCY MEDICINE

## 2022-07-21 PROCEDURE — 96375 TX/PRO/DX INJ NEW DRUG ADDON: CPT

## 2022-07-21 RX ORDER — NAPROXEN 250 MG/1
375 TABLET ORAL ONCE
Status: COMPLETED | OUTPATIENT
Start: 2022-07-21 | End: 2022-07-21

## 2022-07-21 RX ORDER — ONDANSETRON 4 MG/1
4 TABLET, ORALLY DISINTEGRATING ORAL 3 TIMES DAILY PRN
Qty: 21 TABLET | Refills: 0 | Status: SHIPPED | OUTPATIENT
Start: 2022-07-21

## 2022-07-21 RX ORDER — 0.9 % SODIUM CHLORIDE 0.9 %
1000 INTRAVENOUS SOLUTION INTRAVENOUS ONCE
Status: COMPLETED | OUTPATIENT
Start: 2022-07-21 | End: 2022-07-21

## 2022-07-21 RX ORDER — OXYCODONE HYDROCHLORIDE AND ACETAMINOPHEN 5; 325 MG/1; MG/1
1 TABLET ORAL EVERY 6 HOURS PRN
Qty: 12 TABLET | Refills: 0 | Status: SHIPPED | OUTPATIENT
Start: 2022-07-21 | End: 2022-07-24

## 2022-07-21 RX ORDER — ONDANSETRON 2 MG/ML
8 INJECTION INTRAMUSCULAR; INTRAVENOUS ONCE
Status: COMPLETED | OUTPATIENT
Start: 2022-07-21 | End: 2022-07-21

## 2022-07-21 RX ORDER — MORPHINE SULFATE 4 MG/ML
4 INJECTION, SOLUTION INTRAMUSCULAR; INTRAVENOUS ONCE
Status: COMPLETED | OUTPATIENT
Start: 2022-07-21 | End: 2022-07-21

## 2022-07-21 RX ORDER — NAPROXEN 500 MG/1
500 TABLET ORAL 2 TIMES DAILY WITH MEALS
Qty: 100 TABLET | Refills: 0 | Status: SHIPPED | OUTPATIENT
Start: 2022-07-21 | End: 2022-08-02

## 2022-07-21 RX ADMIN — NAPROXEN 375 MG: 250 TABLET ORAL at 15:30

## 2022-07-21 RX ADMIN — SODIUM CHLORIDE 1000 ML: 9 INJECTION, SOLUTION INTRAVENOUS at 13:37

## 2022-07-21 RX ADMIN — ONDANSETRON 8 MG: 2 INJECTION INTRAMUSCULAR; INTRAVENOUS at 13:38

## 2022-07-21 RX ADMIN — MORPHINE SULFATE 4 MG: 4 INJECTION, SOLUTION INTRAMUSCULAR; INTRAVENOUS at 13:38

## 2022-07-21 ASSESSMENT — PAIN SCALES - GENERAL
PAINLEVEL_OUTOF10: 7
PAINLEVEL_OUTOF10: 6

## 2022-07-21 ASSESSMENT — ENCOUNTER SYMPTOMS
SINUS PAIN: 0
BACK PAIN: 0
NAUSEA: 1
ABDOMINAL PAIN: 0
SORE THROAT: 0
DIARRHEA: 0
SHORTNESS OF BREATH: 0
EYE PAIN: 0
VOMITING: 0

## 2022-07-21 ASSESSMENT — PAIN DESCRIPTION - DESCRIPTORS: DESCRIPTORS: ACHING;DISCOMFORT

## 2022-07-21 ASSESSMENT — PAIN DESCRIPTION - LOCATION: LOCATION: OTHER (COMMENT)

## 2022-07-21 ASSESSMENT — PAIN DESCRIPTION - ORIENTATION: ORIENTATION: RIGHT

## 2022-07-21 NOTE — ED NOTES
Department of Emergency Medicine  FIRST PROVIDER TRIAGE NOTE             Independent MLP           7/21/22  12:38 PM EDT    Date of Encounter: 7/21/22   MRN: [id number]      HPI: Nimo Benton is a 68 y.o. female who presents to the ED for Flank Pain (Rt side started this am . Known kidney stone)     Patient is a 66-year-old presenting with right flank pain. Patient has a known kidney stone and was recently admitted. Patient is scheduled for lithotripsy on August 3. Patient states it has not been bothering her until now and she has the worst pain therefore is back in for further evaluation    ROS: Negative for cp, sob, fever, cough, or vomiting. PE: Gen Appearance/Constitutional: alert  HEENT: NC/NT. PERRLA,  Airway patent. Neck: supple     Initial Plan of Care: All treatment areas with department are currently occupied. Plan to order/Initiate the following while awaiting opening in ED: labs and imaging studies.   Initiate Treatment-Testing, Proceed toTreatment Area When Bed Available for ED Attending/MLP to Continue Care    Electronically signed by Jabari Dooley PA-C   DD: 7/21/22       Jabari Dooley PA-C  07/21/22 [id number]

## 2022-07-21 NOTE — ED PROVIDER NOTES
Chief Complaint   Patient presents with    Flank Pain     Rt side started this am . Known kidney stone       66-year-old female past medical history of hypertension presenting today with acute onset of right-sided flank pain. Nothing has made it better or worse, associated with nausea and vomiting. She was seen in the hospital on 6/24 and had a septic kidney stone, she had a PICC line in place and has been receiving Rocephin IV since then. She said they were unable to do the operation due to ureter the stone at that time that she is supposed to have a surgery on 8/3 to correct that. The pain has been nonradiating, she is not experiencing dysuria or hematuria, no fever or chills. No other abdominal pain. No vaginal bleeding or other discharge. It has been constant since it started and is very sharp in nature. No other acute complaints. Review of Systems   Constitutional:  Negative for chills and fever. HENT:  Negative for ear pain, sinus pain and sore throat. Eyes:  Negative for pain. Respiratory:  Negative for shortness of breath. Cardiovascular:  Negative for chest pain. Gastrointestinal:  Positive for nausea. Negative for abdominal pain, diarrhea and vomiting. Genitourinary:  Positive for flank pain. Negative for pelvic pain. Musculoskeletal:  Negative for back pain and neck pain. Skin:  Negative for rash. Neurological:  Negative for seizures and headaches. Hematological:  Negative for adenopathy. All other systems reviewed and are negative. Physical Exam  Vitals and nursing note reviewed. Constitutional:       General: She is not in acute distress. Appearance: She is well-developed. She is not toxic-appearing. HENT:      Head: Normocephalic and atraumatic. Right Ear: External ear normal.      Left Ear: External ear normal.      Nose: Nose normal.      Mouth/Throat:      Mouth: Mucous membranes are moist.      Pharynx: Oropharynx is clear.    Eyes: Pupils: Pupils are equal, round, and reactive to light. Cardiovascular:      Rate and Rhythm: Normal rate and regular rhythm. Heart sounds: Normal heart sounds. No murmur heard. Pulmonary:      Effort: Pulmonary effort is normal. No respiratory distress. Breath sounds: Normal breath sounds. No wheezing. Abdominal:      General: Bowel sounds are normal.      Palpations: Abdomen is soft. Tenderness: There is abdominal tenderness. There is no guarding or rebound. Comments: Right flank pain tenderness to palpation   Musculoskeletal:         General: No swelling. Cervical back: Normal range of motion and neck supple. Skin:     General: Skin is warm and dry. Coloration: Skin is not pale. Neurological:      Mental Status: She is alert and oriented to person, place, and time. Procedures       MDM  Number of Diagnoses or Management Options  Flank pain  Diagnosis management comments: 29-year-old female past medical history of hypertension presenting with acute onset of right-sided flank pain. On arrival to emergency room she is slightly hypertensive otherwise hemodynamically stable. Afebrile. Lab work did not demonstrate evidence this leukocytosis or UTI, lactic acid was within normal limits and electrolytes and kidney function were normal as well. No acute kidney pathology noted. CT abdomen pelvis demonstrated a large 1.1 cm obstructing stone in her left UPJ. Nonobstructing stones on the right side. Urology was in the emergency department and the case was run past them, they note that since the patient already has a procedure scheduled for 8/3 that she should keep her procedure as she is not septic, not demonstrating for infection. That being said patient was given extremely strict return precautions that if she were to develop increasing pain or fever or any other worsening symptoms that she should return to the emergency department for further evaluation.   Otherwise she will continue to follow-up with primary care as well as urology outpatient. Verbalized understanding the plan. ED Course as of 07/21/22 2005 Thu Jul 21, 2022   1508 Patient still experiencing some discomfort, will discuss the case with urology prior to her leaving as they are in the department. [MM]   6375 Discussed with urology, they note that the procedure she has occurred once a week and as she is not septic, we can pain control her and have her sent home with very strict return precautions and continue her regular follow-up. [MM]      ED Course User Index  [MM] Quinn Garcia DO        ED Course as of 07/21/22 2005 Th Jul 21, 2022   1508 Patient still experiencing some discomfort, will discuss the case with urology prior to her leaving as they are in the department. [MM]   0641 Discussed with urology, they note that the procedure she has occurred once a week and as she is not septic, we can pain control her and have her sent home with very strict return precautions and continue her regular follow-up. [MM]      ED Course User Index  [MM] Quinn Garcia DO       --------------------------------------------- PAST HISTORY ---------------------------------------------  Past Medical History:  has a past medical history of Breast cyst, right, History of fracture of upper extremity, History of ovarian cyst, Hypertension, Osteoarthritis, Renal calculus, and Thyroid disease. Past Surgical History:  has a past surgical history that includes ovarian cyst removal; Lithotripsy (Left, [id number]); other surgical history; picc line insertion nurse (6/27/2022); and eye surgery (Bilateral). Social History:  reports that she has never smoked. She has never used smokeless tobacco. She reports that she does not drink alcohol and does not use drugs. Family History: family history includes Alcohol Abuse in her brother; Arthritis in her sister; Heart Failure in her father; No Known Problems in her sister;  Other in her father; Ovarian Cancer in her mother; Stroke in her sister. The patients home medications have been reviewed.     Allergies: Claritin [loratadine], Levothyroxine, Pcn [penicillins], and Betamethasone dipropionate    -------------------------------------------------- RESULTS -------------------------------------------------  Labs:  Results for orders placed or performed during the hospital encounter of 07/21/22   CBC with Auto Differential   Result Value Ref Range    WBC 9.1 4.5 - 11.5 E9/L    RBC 4.56 3.50 - 5.50 E12/L    Hemoglobin 13.9 11.5 - 15.5 g/dL    Hematocrit 42.6 34.0 - 48.0 %    MCV 93.4 80.0 - 99.9 fL    MCH 30.5 26.0 - 35.0 pg    MCHC 32.6 32.0 - 34.5 %    RDW 13.2 11.5 - 15.0 fL    Platelets 182 277 - 001 E9/L    MPV 9.5 7.0 - 12.0 fL    Neutrophils % 80.0 43.0 - 80.0 %    Immature Granulocytes % 0.4 0.0 - 5.0 %    Lymphocytes % 8.8 (L) 20.0 - 42.0 %    Monocytes % 9.7 2.0 - 12.0 %    Eosinophils % 0.8 0.0 - 6.0 %    Basophils % 0.3 0.0 - 2.0 %    Neutrophils Absolute 7.23 1.80 - 7.30 E9/L    Immature Granulocytes # 0.04 E9/L    Lymphocytes Absolute 0.80 (L) 1.50 - 4.00 E9/L    Monocytes Absolute 0.88 0.10 - 0.95 E9/L    Eosinophils Absolute 0.07 0.05 - 0.50 E9/L    Basophils Absolute 0.03 0.00 - 0.20 E9/L   Comprehensive Metabolic Panel   Result Value Ref Range    Sodium 140 132 - 146 mmol/L    Potassium 4.1 3.5 - 5.0 mmol/L    Chloride 105 98 - 107 mmol/L    CO2 22 22 - 29 mmol/L    Anion Gap 13 7 - 16 mmol/L    Glucose 117 (H) 74 - 99 mg/dL    BUN 19 6 - 23 mg/dL    Creatinine 0.8 0.5 - 1.0 mg/dL    GFR Non-African American >60 >=60 mL/min/1.73    GFR African American >60     Calcium 9.7 8.6 - 10.2 mg/dL    Total Protein 6.9 6.4 - 8.3 g/dL    Albumin 4.1 3.5 - 5.2 g/dL    Total Bilirubin 0.4 0.0 - 1.2 mg/dL    Alkaline Phosphatase 56 35 - 104 U/L    ALT 18 0 - 32 U/L    AST 18 0 - 31 U/L   Lactic Acid   Result Value Ref Range    Lactic Acid 1.0 0.5 - 2.2 mmol/L   Urinalysis with Microscopic   Result Value Ref Range    Color, UA Yellow Straw/Yellow    Clarity, UA Clear Clear    Glucose, Ur Negative Negative mg/dL    Bilirubin Urine Negative Negative    Ketones, Urine Negative Negative mg/dL    Specific Gravity, UA 1.015 1.005 - 1.030    Blood, Urine Negative Negative    pH, UA 7.5 5.0 - 9.0    Protein, UA Negative Negative mg/dL    Urobilinogen, Urine 0.2 <2.0 E.U./dL    Nitrite, Urine Negative Negative    Leukocyte Esterase, Urine TRACE (A) Negative    Mucus, UA Present (A) None Seen /LPF    WBC, UA NONE 0 - 5 /HPF    RBC, UA 0-1 0 - 2 /HPF    Bacteria, UA FEW (A) None Seen /HPF       Radiology:  CT ABDOMEN PELVIS WO CONTRAST Additional Contrast? None   Final Result   There is moderate obstruction of the right renal collecting system with large   1.1 cm stone at the left UPJ. Some stranding seen surrounding the left   kidney. Right kidney reveals several nonobstructing stones seen in the lower pole of   the left kidney. Remainder of the abdomen and pelvis is grossly unremarkable.             ------------------------- NURSING NOTES AND VITALS REVIEWED ---------------------------  Date / Time Roomed:  7/21/2022 12:38 PM  ED Bed Assignment:  [id number]    The nursing notes within the ED encounter and vital signs as below have been reviewed. BP (!) 158/78   Pulse 71   Temp 97.8 °F (36.6 °C) (Temporal)   Resp 18   Ht 5' 4\" (1.626 m)   Wt 203 lb (92.1 kg)   SpO2 100%   BMI 34.84 kg/m²   Oxygen Saturation Interpretation: Normal      ------------------------------------------ PROGRESS NOTES ------------------------------------------  I have spoken with the patient and discussed todays results, in addition to providing specific details for the plan of care and counseling regarding the diagnosis and prognosis. Their questions are answered at this time and they are agreeable with the plan. I discussed at length with them reasons for immediate return here for re evaluation.  They will followup with primary care by calling their office tomorrow. --------------------------------- ADDITIONAL PROVIDER NOTES ---------------------------------  At this time the patient is without objective evidence of an acute process requiring hospitalization or inpatient management. They have remained hemodynamically stable throughout their entire ED visit and are stable for discharge with outpatient follow-up. The plan has been discussed in detail and they are aware of the specific conditions for emergent return, as well as the importance of follow-up. Discharge Medication List as of 7/21/2022  4:43 PM        START taking these medications    Details   naproxen (NAPROSYN) 500 MG tablet Take 1 tablet by mouth in the morning and 1 tablet in the evening. Take with meals. , Disp-100 tablet, R-0Print      ondansetron (ZOFRAN-ODT) 4 MG disintegrating tablet Take 1 tablet by mouth 3 times daily as needed for Nausea or Vomiting, Disp-21 tablet, R-0Print      oxyCODONE-acetaminophen (PERCOCET) 5-325 MG per tablet Take 1 tablet by mouth every 6 hours as needed for Pain for up to 3 days. Intended supply: 3 days. Take lowest dose possible to manage pain, Disp-12 tablet, R-0Print             Diagnosis:  1. Flank pain        Disposition:  Patient's disposition: Discharge to home  Patient's condition is stable.            Leontine Gitelman, DO  Resident  07/21/22 2005

## 2022-07-21 NOTE — DISCHARGE INSTRUCTIONS
He began to experience any fevers chills or any worsening of your symptoms, return to the emergency department. Symptomatic management with Tylenol, ibuprofen, ice, heat, drink plenty of fluids. Keep your follow-up appointment as indicated.

## 2022-07-23 ENCOUNTER — HOSPITAL ENCOUNTER (EMERGENCY)
Age: 77
Discharge: HOME | End: 2022-07-23
Attending: EMERGENCY MEDICINE
Payer: MEDICARE

## 2022-07-23 VITALS
HEIGHT: 64 IN | SYSTOLIC BLOOD PRESSURE: 168 MMHG | BODY MASS INDEX: 34.66 KG/M2 | TEMPERATURE: 97.4 F | HEART RATE: 75 BPM | OXYGEN SATURATION: 97 % | DIASTOLIC BLOOD PRESSURE: 97 MMHG | RESPIRATION RATE: 14 BRPM | WEIGHT: 203 LBS

## 2022-07-23 DIAGNOSIS — T78.40XA ALLERGIC REACTION TO DRUG, INITIAL ENCOUNTER: Primary | ICD-10-CM

## 2022-07-23 LAB
ORGANISM: ABNORMAL
URINE CULTURE, ROUTINE: ABNORMAL

## 2022-07-23 PROCEDURE — 99283 EMERGENCY DEPT VISIT LOW MDM: CPT

## 2022-07-23 PROCEDURE — 6370000000 HC RX 637 (ALT 250 FOR IP): Performed by: EMERGENCY MEDICINE

## 2022-07-23 RX ORDER — FAMOTIDINE 20 MG/1
20 TABLET, FILM COATED ORAL ONCE
Status: COMPLETED | OUTPATIENT
Start: 2022-07-23 | End: 2022-07-23

## 2022-07-23 RX ORDER — FAMOTIDINE 20 MG/1
20 TABLET, FILM COATED ORAL 2 TIMES DAILY
Qty: 14 TABLET | Refills: 0 | Status: SHIPPED | OUTPATIENT
Start: 2022-07-23 | End: 2022-08-02 | Stop reason: ALTCHOICE

## 2022-07-23 RX ADMIN — FAMOTIDINE 20 MG: 20 TABLET ORAL at 09:57

## 2022-07-23 ASSESSMENT — ENCOUNTER SYMPTOMS
DIARRHEA: 0
ABDOMINAL DISTENTION: 0
SHORTNESS OF BREATH: 0
ABDOMINAL PAIN: 0
FACIAL SWELLING: 1
NAUSEA: 0
WHEEZING: 0
SINUS PRESSURE: 0
BACK PAIN: 0
EYE PAIN: 0
SORE THROAT: 0
TROUBLE SWALLOWING: 1
EYE REDNESS: 0
EYE DISCHARGE: 0
VOICE CHANGE: 0
COUGH: 0
THROAT SWELLING: 1
VOMITING: 0
PERI-ORBITAL EDEMA: 1

## 2022-07-23 ASSESSMENT — PAIN - FUNCTIONAL ASSESSMENT: PAIN_FUNCTIONAL_ASSESSMENT: NONE - DENIES PAIN

## 2022-07-23 NOTE — ED PROVIDER NOTES
58-year-old female presents to the emergency department with facial swelling and a rash. Patient received an IV medication on 21 July and had an allergic reaction to this. She states she has been taking Benadryl which has helped with the rash but when she woke up this morning having the facial swelling and the concerns that she felt like her throat was tightening up she came into the emergency department for further evaluation she did take Benadryl prior to arrival and states there has been improvement of her symptoms. She states at this time no throat tightening no vision changes she does admit to a rash on her back and on her left upper extremity. She states no other complaints at this time including lightheadedness dizziness chest tightness wheezing or other complaints at this time    The history is provided by the patient. Rash  Location:  Shoulder/arm and torso  Shoulder/arm rash location:  L forearm and R forearm  Torso rash location:  Upper back and lower back  Quality: itchiness    Severity:  Mild  Onset quality:  Gradual  Duration:  2 days  Timing:  Intermittent  Progression:  Worsening  Chronicity:  New  Relieved by:  Nothing  Worsened by:  Nothing  Ineffective treatments:  None tried  Associated symptoms: periorbital edema and throat swelling    Associated symptoms: no abdominal pain, no diarrhea, no fatigue, no fever, no headaches, no joint pain, no myalgias, no nausea, no shortness of breath, no sore throat, not vomiting and not wheezing       Review of Systems   Constitutional:  Negative for chills, fatigue and fever. HENT:  Positive for facial swelling and trouble swallowing. Negative for ear pain, sinus pressure, sore throat and voice change. Eyes:  Negative for pain, discharge and redness. Respiratory:  Negative for cough, shortness of breath and wheezing. Cardiovascular:  Negative for chest pain.    Gastrointestinal:  Negative for abdominal distention, abdominal pain, diarrhea, nausea and vomiting. Genitourinary:  Negative for dysuria and frequency. Musculoskeletal:  Negative for arthralgias, back pain and myalgias. Skin:  Positive for rash. Negative for wound. Neurological:  Negative for weakness and headaches. Hematological:  Negative for adenopathy. All other systems reviewed and are negative. Physical Exam  Constitutional:       Appearance: Normal appearance. HENT:      Head: Normocephalic and atraumatic. Nose: Nose normal.   Eyes:      Extraocular Movements: Extraocular movements intact. Pupils: Pupils are equal, round, and reactive to light. Neck:      Trachea: Phonation normal.   Cardiovascular:      Rate and Rhythm: Normal rate and regular rhythm. Pulmonary:      Effort: Pulmonary effort is normal.      Breath sounds: Normal breath sounds. Abdominal:      General: Abdomen is flat. Bowel sounds are normal. There is no distension. Palpations: Abdomen is soft. Tenderness: There is no abdominal tenderness. There is no guarding. Musculoskeletal:         General: Normal range of motion. Cervical back: Normal range of motion and neck supple. Skin:     General: Skin is warm. Capillary Refill: Capillary refill takes less than 2 seconds. Neurological:      General: No focal deficit present. Mental Status: She is alert and oriented to person, place, and time. Procedures     MDM  Number of Diagnoses or Management Options  Allergic reaction to drug, initial encounter  Diagnosis management comments: Patient seen and examined. Patient appears to be having symptoms related to an allergic reaction from medication provided a few days ago. Patient took Benadryl this morning which improved symptoms. On evaluation she does have some rash that appears urticarial on back and upper extremities. Additional Pepcid was provided on reevaluation patient has improvement of the rash and states she has improvement.   She will be discharged with additional Pepcid to go with her Benadryl and to take as needed. Patient was discharged with outpatient follow-up               --------------------------------------------- PAST HISTORY ---------------------------------------------  Past Medical History:  has a past medical history of Breast cyst, right, History of fracture of upper extremity, History of ovarian cyst, Hypertension, Osteoarthritis, Renal calculus, and Thyroid disease. Past Surgical History:  has a past surgical history that includes ovarian cyst removal; Lithotripsy (Left, [id number]); other surgical history; picc line insertion nurse (6/27/2022); and eye surgery (Bilateral). Social History:  reports that she has never smoked. She has never used smokeless tobacco. She reports that she does not drink alcohol and does not use drugs. Family History: family history includes Alcohol Abuse in her brother; Arthritis in her sister; Heart Failure in her father; No Known Problems in her sister; Other in her father; Ovarian Cancer in her mother; Stroke in her sister. The patients home medications have been reviewed. Allergies: Claritin [loratadine], Levothyroxine, Pcn [penicillins], and Betamethasone dipropionate    -------------------------------------------------- RESULTS -------------------------------------------------  Labs:  No results found for this visit on 07/23/22. Radiology:  No orders to display       ------------------------- NURSING NOTES AND VITALS REVIEWED ---------------------------  Date / Time Roomed:  7/23/2022  9:25 AM  ED Bed Assignment:  19/19    The nursing notes within the ED encounter and vital signs as below have been reviewed.    BP (!) 168/97   Pulse 75   Temp 97.4 °F (36.3 °C) (Temporal)   Resp 14   Ht 5' 4\" (1.626 m)   Wt 203 lb (92.1 kg)   SpO2 97%   BMI 34.84 kg/m²   Oxygen Saturation Interpretation: Normal      ------------------------------------------ PROGRESS NOTES ------------------------------------------  I have spoken with the patient and discussed todays results, in addition to providing specific details for the plan of care and counseling regarding the diagnosis and prognosis. Their questions are answered at this time and they are agreeable with the plan. I discussed at length with them reasons for immediate return here for re evaluation. They will followup with their primary care physician by calling their office tomorrow. --------------------------------- ADDITIONAL PROVIDER NOTES ---------------------------------  At this time the patient is without objective evidence of an acute process requiring hospitalization or inpatient management. They have remained hemodynamically stable throughout their entire ED visit and are stable for discharge with outpatient follow-up. The plan has been discussed in detail and they are aware of the specific conditions for emergent return, as well as the importance of follow-up. New Prescriptions    FAMOTIDINE (PEPCID) 20 MG TABLET    Take 1 tablet by mouth in the morning and 1 tablet before bedtime. Do all this for 7 days. Diagnosis:  1. Allergic reaction to drug, initial encounter        Disposition:  Patient's disposition: Discharge to home  Patient's condition is stable.        Tamara Wang DO  07/23/22 1058

## 2022-07-27 NOTE — DISCHARGE SUMMARY
Physician Discharge Summary     Patient ID:  Iraida Weston  [id number]  44 y.o.  1945    Admit date: 6/24/2022    Discharge date and time: 6/28/2022  4:37 PM     Admission Diagnoses: Ureteral stone [N20.1]    Discharge Diagnoses:   Gram-negative sepsis  E. coli bacteremia  Right ureteral stone  Complicated UTI  Volume overload  Patient Active Problem List   Diagnosis    Cellulitis    Essential hypertension    Osteoarthritis    Primary open angle glaucoma (POAG)    Hyperglycemia    Elevated lipoprotein(a)    Morbidly obese (HCC)    Non compliance with medical treatment    Ureteral stone    Hydronephrosis    Septicemia due to E. coli ([facility] [address]       Consults: ID    Procedures:     Hospital Course:   57-year-old woman admitted through emergency room due to right flank pain fever and chills  E. coli bacteremia was noted with right  ureteral stone  Responded well to IV fluids and Rocephin  Discharged home on IV Rocephin  Lithotripsy as an outpatient when cultures are clear  Discharge Exam:  See progress note from today    Disposition: Stable at the time of discharge  Discharge to home  Patient Instructions:   Discharge Medication List as of 6/28/2022  3:45 PM        START taking these medications    Details   cefTRIAXone sodium 1 g SOLR Infuse 2 vials intravenously daily for 14 days, Disp-10 each, R-0Print           CONTINUE these medications which have NOT CHANGED    Details   triamcinolone (KENALOG) 0.1 % cream Apply topically 2 times daily. , Disp-453.6 g, R-2, Normal      Omega-3 Fatty Acids (FISH OIL) 1000 MG CAPS Take 1,000 mg by mouth dailyHistorical Med      Cholecalciferol (VITAMIN D3) 5000 units TABS Take 1 tablet by mouth dailyHistorical Med      POLICOSANOL PO Take by mouthHistorical Med      Turmeric 500 MG CAPS Take by mouthHistorical Med      MAGNESIUM PO Take by mouthHistorical Med      latanoprost (XALATAN) 0.005 % ophthalmic solution U 1 GTT IN OU QHS, R-11Historical Med      Multiple Vitamins-Minerals (THERAPEUTIC MULTIVITAMIN-MINERALS) tablet Take 1 tablet by mouth daily      Coenzyme Q10 (CO Q 10) 100 MG CAPS Take 100 mg by mouth 2 times daily      !! NONFORMULARY 2 times daily VISION ESSENCE      !! NONFORMULARY 2 times daily KYOLIC  FORMULA 123 - TAKES 2 IN AM AND 2 IN PM,       !! - Potential duplicate medications found. Please discuss with provider.         Activity: As tolerated  Diet: General    Follow-up with PCP ID and urology    Note that over 40 minutes was spent in preparing discharge papers, discussing discharge with patient, medication review, etc.    Signed:  Heidi Matute MD  7/27/2022  3:58 PM

## 2022-08-01 ENCOUNTER — PREP FOR PROCEDURE (OUTPATIENT)
Dept: UROLOGY | Age: 77
End: 2022-08-01

## 2022-08-01 RX ORDER — SODIUM CHLORIDE 9 MG/ML
INJECTION, SOLUTION INTRAVENOUS CONTINUOUS
Status: CANCELLED | OUTPATIENT
Start: 2022-08-01

## 2022-08-01 RX ORDER — SODIUM CHLORIDE 0.9 % (FLUSH) 0.9 %
5-40 SYRINGE (ML) INJECTION EVERY 12 HOURS SCHEDULED
Status: CANCELLED | OUTPATIENT
Start: 2022-08-01

## 2022-08-01 RX ORDER — SODIUM CHLORIDE 0.9 % (FLUSH) 0.9 %
5-40 SYRINGE (ML) INJECTION PRN
Status: CANCELLED | OUTPATIENT
Start: 2022-08-01

## 2022-08-01 RX ORDER — SODIUM CHLORIDE 9 MG/ML
INJECTION, SOLUTION INTRAVENOUS PRN
Status: CANCELLED | OUTPATIENT
Start: 2022-08-01

## 2022-08-02 NOTE — PROGRESS NOTES
[facility] PRE-ADMISSION TESTING INSTRUCTIONS    The Preadmission Testing patient is instructed accordingly using the following criteria (check applicable):    ARRIVAL INSTRUCTIONS:  [x] Parking the day of Surgery is located in the Main Entrance lot. Upon entering the door, make an immediate right to the surgery reception desk    [x] Bring photo ID and insurance card    [x] Bring in a copy of Living will or Durable Power of  papers. [x] Please be sure to arrange for responsible adult to provide transportation to and from the hospital    [x] Please arrange for responsible adult to be with you for the 24 hour period post procedure due to having anesthesia    [x] If you awake am of surgery not feeling well or have temperature >100 please call [phone]    GENERAL INSTRUCTIONS:    [x] Nothing by mouth after midnight, including gum, candy, mints or water    [x] You may brush your teeth, but do not swallow any water    [] Take medications as instructed with 1-2 oz of water    [x] Stop herbal supplements and vitamins 5 days prior to procedure    [x] Shower or bath with soap, lather and rinse well, AM of Surgery, no lotion, powders or creams to surgical site    [x] Pt states she was told to take Dulcolax - she is to call Dr Morro Zamudio office to clarify    [x] No tobacco products within 24 hours of surgery     [x] No alcohol or illegal drug use within 24 hours of surgery.     [x] Jewelry, body piercing's, eyeglasses, contact lenses and dentures are not permitted into surgery (bring cases)      [x] Please do not wear any nail polish, make up or hair products on the day of surgery    [x] You can expect a call the business day prior to procedure to notify you if your arrival time changes    [x] If you receive a survey after surgery we would greatly appreciate your comments    [x] Please notify surgeon if you develop any illness between now and time of surgery (cold, cough, sore throat, fever, nausea, vomiting) or any signs of infections  including skin, wounds, and dental.    [x]  The Outpatient Pharmacy is available to fill your prescription here on your day of surgery, ask your preop nurse for details

## 2022-08-03 ENCOUNTER — HOSPITAL ENCOUNTER (OUTPATIENT)
Age: 77
Setting detail: OUTPATIENT SURGERY
Discharge: HOME | End: 2022-08-03
Attending: STUDENT IN AN ORGANIZED HEALTH CARE EDUCATION/TRAINING PROGRAM | Admitting: STUDENT IN AN ORGANIZED HEALTH CARE EDUCATION/TRAINING PROGRAM
Payer: MEDICARE

## 2022-08-03 ENCOUNTER — ANESTHESIA (OUTPATIENT)
Dept: OPERATING ROOM | Age: 77
End: 2022-08-03
Payer: MEDICARE

## 2022-08-03 ENCOUNTER — ANESTHESIA EVENT (OUTPATIENT)
Dept: OPERATING ROOM | Age: 77
End: 2022-08-03
Payer: MEDICARE

## 2022-08-03 VITALS
DIASTOLIC BLOOD PRESSURE: 89 MMHG | SYSTOLIC BLOOD PRESSURE: 172 MMHG | RESPIRATION RATE: 16 BRPM | HEART RATE: 70 BPM | BODY MASS INDEX: 34.66 KG/M2 | OXYGEN SATURATION: 96 % | HEIGHT: 64 IN | TEMPERATURE: 97.6 F | WEIGHT: 203 LBS

## 2022-08-03 DIAGNOSIS — N20.1 CALCULUS OF URETER: Primary | ICD-10-CM

## 2022-08-03 LAB
EKG ATRIAL RATE: 88 BPM
EKG P AXIS: -9 DEGREES
EKG P-R INTERVAL: 180 MS
EKG Q-T INTERVAL: 382 MS
EKG QRS DURATION: 110 MS
EKG QTC CALCULATION (BAZETT): 462 MS
EKG R AXIS: 1 DEGREES
EKG T AXIS: -27 DEGREES
EKG VENTRICULAR RATE: 88 BPM

## 2022-08-03 PROCEDURE — 7100000001 HC PACU RECOVERY - ADDTL 15 MIN: Performed by: STUDENT IN AN ORGANIZED HEALTH CARE EDUCATION/TRAINING PROGRAM

## 2022-08-03 PROCEDURE — 3600000014 HC SURGERY LEVEL 4 ADDTL 15MIN: Performed by: STUDENT IN AN ORGANIZED HEALTH CARE EDUCATION/TRAINING PROGRAM

## 2022-08-03 PROCEDURE — 3600000003 HC SURGERY LEVEL 3 BASE: Performed by: STUDENT IN AN ORGANIZED HEALTH CARE EDUCATION/TRAINING PROGRAM

## 2022-08-03 PROCEDURE — 3600000013 HC SURGERY LEVEL 3 ADDTL 15MIN: Performed by: STUDENT IN AN ORGANIZED HEALTH CARE EDUCATION/TRAINING PROGRAM

## 2022-08-03 PROCEDURE — 3600000004 HC SURGERY LEVEL 4 BASE: Performed by: STUDENT IN AN ORGANIZED HEALTH CARE EDUCATION/TRAINING PROGRAM

## 2022-08-03 PROCEDURE — 6360000002 HC RX W HCPCS: Performed by: NURSE PRACTITIONER

## 2022-08-03 PROCEDURE — C2617 STENT, NON-COR, TEM W/O DEL: HCPCS | Performed by: STUDENT IN AN ORGANIZED HEALTH CARE EDUCATION/TRAINING PROGRAM

## 2022-08-03 PROCEDURE — 93005 ELECTROCARDIOGRAM TRACING: CPT | Performed by: STUDENT IN AN ORGANIZED HEALTH CARE EDUCATION/TRAINING PROGRAM

## 2022-08-03 PROCEDURE — 6360000002 HC RX W HCPCS: Performed by: NURSE ANESTHETIST, CERTIFIED REGISTERED

## 2022-08-03 PROCEDURE — 7100000011 HC PHASE II RECOVERY - ADDTL 15 MIN: Performed by: STUDENT IN AN ORGANIZED HEALTH CARE EDUCATION/TRAINING PROGRAM

## 2022-08-03 PROCEDURE — 3700000001 HC ADD 15 MINUTES (ANESTHESIA): Performed by: STUDENT IN AN ORGANIZED HEALTH CARE EDUCATION/TRAINING PROGRAM

## 2022-08-03 PROCEDURE — 7100000010 HC PHASE II RECOVERY - FIRST 15 MIN: Performed by: STUDENT IN AN ORGANIZED HEALTH CARE EDUCATION/TRAINING PROGRAM

## 2022-08-03 PROCEDURE — 7100000000 HC PACU RECOVERY - FIRST 15 MIN: Performed by: STUDENT IN AN ORGANIZED HEALTH CARE EDUCATION/TRAINING PROGRAM

## 2022-08-03 PROCEDURE — 2580000003 HC RX 258: Performed by: NURSE PRACTITIONER

## 2022-08-03 PROCEDURE — 2709999900 HC NON-CHARGEABLE SUPPLY: Performed by: STUDENT IN AN ORGANIZED HEALTH CARE EDUCATION/TRAINING PROGRAM

## 2022-08-03 PROCEDURE — C1769 GUIDE WIRE: HCPCS | Performed by: STUDENT IN AN ORGANIZED HEALTH CARE EDUCATION/TRAINING PROGRAM

## 2022-08-03 PROCEDURE — 3700000000 HC ANESTHESIA ATTENDED CARE: Performed by: STUDENT IN AN ORGANIZED HEALTH CARE EDUCATION/TRAINING PROGRAM

## 2022-08-03 DEVICE — URETERAL STENT
Type: IMPLANTABLE DEVICE | Site: URETER | Status: FUNCTIONAL
Brand: POLARIS™ ULTRA

## 2022-08-03 RX ORDER — SODIUM CHLORIDE 9 MG/ML
INJECTION, SOLUTION INTRAVENOUS PRN
Status: DISCONTINUED | OUTPATIENT
Start: 2022-08-03 | End: 2022-08-03 | Stop reason: HOSPADM

## 2022-08-03 RX ORDER — SODIUM CHLORIDE 0.9 % (FLUSH) 0.9 %
5-40 SYRINGE (ML) INJECTION EVERY 12 HOURS SCHEDULED
Status: DISCONTINUED | OUTPATIENT
Start: 2022-08-03 | End: 2022-08-03 | Stop reason: HOSPADM

## 2022-08-03 RX ORDER — ONDANSETRON 2 MG/ML
INJECTION INTRAMUSCULAR; INTRAVENOUS PRN
Status: DISCONTINUED | OUTPATIENT
Start: 2022-08-03 | End: 2022-08-03 | Stop reason: SDUPTHER

## 2022-08-03 RX ORDER — DIPHENHYDRAMINE HYDROCHLORIDE 50 MG/ML
12.5 INJECTION INTRAMUSCULAR; INTRAVENOUS
Status: DISCONTINUED | OUTPATIENT
Start: 2022-08-03 | End: 2022-08-03 | Stop reason: HOSPADM

## 2022-08-03 RX ORDER — SODIUM CHLORIDE 0.9 % (FLUSH) 0.9 %
5-40 SYRINGE (ML) INJECTION PRN
Status: DISCONTINUED | OUTPATIENT
Start: 2022-08-03 | End: 2022-08-03 | Stop reason: HOSPADM

## 2022-08-03 RX ORDER — TAMSULOSIN HYDROCHLORIDE 0.4 MG/1
0.4 CAPSULE ORAL DAILY
Qty: 30 CAPSULE | Refills: 0 | Status: SHIPPED | OUTPATIENT
Start: 2022-08-03

## 2022-08-03 RX ORDER — SODIUM CHLORIDE 9 MG/ML
INJECTION, SOLUTION INTRAVENOUS CONTINUOUS
Status: DISCONTINUED | OUTPATIENT
Start: 2022-08-03 | End: 2022-08-03 | Stop reason: HOSPADM

## 2022-08-03 RX ORDER — OXYCODONE HYDROCHLORIDE AND ACETAMINOPHEN 5; 325 MG/1; MG/1
1 TABLET ORAL EVERY 6 HOURS PRN
Qty: 12 TABLET | Refills: 0 | Status: SHIPPED | OUTPATIENT
Start: 2022-08-03 | End: 2022-08-06

## 2022-08-03 RX ORDER — MEPERIDINE HYDROCHLORIDE 25 MG/ML
12.5 INJECTION INTRAMUSCULAR; INTRAVENOUS; SUBCUTANEOUS EVERY 10 MIN PRN
Status: DISCONTINUED | OUTPATIENT
Start: 2022-08-03 | End: 2022-08-03 | Stop reason: HOSPADM

## 2022-08-03 RX ORDER — FENTANYL CITRATE 50 UG/ML
50 INJECTION, SOLUTION INTRAMUSCULAR; INTRAVENOUS EVERY 5 MIN PRN
Status: DISCONTINUED | OUTPATIENT
Start: 2022-08-03 | End: 2022-08-03 | Stop reason: HOSPADM

## 2022-08-03 RX ORDER — FENTANYL CITRATE 50 UG/ML
INJECTION, SOLUTION INTRAMUSCULAR; INTRAVENOUS PRN
Status: DISCONTINUED | OUTPATIENT
Start: 2022-08-03 | End: 2022-08-03 | Stop reason: SDUPTHER

## 2022-08-03 RX ORDER — PROPOFOL 10 MG/ML
INJECTION, EMULSION INTRAVENOUS PRN
Status: DISCONTINUED | OUTPATIENT
Start: 2022-08-03 | End: 2022-08-03 | Stop reason: SDUPTHER

## 2022-08-03 RX ORDER — PROCHLORPERAZINE EDISYLATE 5 MG/ML
5 INJECTION INTRAMUSCULAR; INTRAVENOUS
Status: DISCONTINUED | OUTPATIENT
Start: 2022-08-03 | End: 2022-08-03 | Stop reason: HOSPADM

## 2022-08-03 RX ADMIN — SODIUM CHLORIDE: 9 INJECTION, SOLUTION INTRAVENOUS at 09:45

## 2022-08-03 RX ADMIN — VANCOMYCIN HYDROCHLORIDE 1500 MG: 10 INJECTION, POWDER, LYOPHILIZED, FOR SOLUTION INTRAVENOUS at 08:13

## 2022-08-03 RX ADMIN — FENTANYL CITRATE 25 MCG: 50 INJECTION, SOLUTION INTRAMUSCULAR; INTRAVENOUS at 10:35

## 2022-08-03 RX ADMIN — FENTANYL CITRATE 50 MCG: 50 INJECTION, SOLUTION INTRAMUSCULAR; INTRAVENOUS at 09:55

## 2022-08-03 RX ADMIN — PROPOFOL 200 MG: 10 INJECTION, EMULSION INTRAVENOUS at 09:55

## 2022-08-03 RX ADMIN — FENTANYL CITRATE 25 MCG: 50 INJECTION, SOLUTION INTRAMUSCULAR; INTRAVENOUS at 10:18

## 2022-08-03 RX ADMIN — ONDANSETRON 4 MG: 2 INJECTION INTRAMUSCULAR; INTRAVENOUS at 10:33

## 2022-08-03 ASSESSMENT — PAIN SCALES - GENERAL
PAINLEVEL_OUTOF10: 0

## 2022-08-03 ASSESSMENT — PAIN DESCRIPTION - DESCRIPTORS: DESCRIPTORS: SORE

## 2022-08-03 ASSESSMENT — LIFESTYLE VARIABLES: SMOKING_STATUS: 0

## 2022-08-03 ASSESSMENT — PAIN - FUNCTIONAL ASSESSMENT: PAIN_FUNCTIONAL_ASSESSMENT: 0-10

## 2022-08-03 NOTE — PROGRESS NOTES
CLINICAL PHARMACY NOTE: MEDS TO BEDS    Total # of Prescriptions Filled: 2   The following medications were delivered to the patient:  Tamsulosin 0.4 mg  Percocet 5-325 mg    Additional Documentation:

## 2022-08-03 NOTE — DISCHARGE INSTRUCTIONS
Extracorporeal Shock Wave Lithotripsy (ESWL)    These are general instructions for you to follow after your surgery. Your doctor or a member of his or her staff will outline any additional or special instructions that pertain to you. What is an ESWL? A ESWL is a procedure that uses sound waves from outside the body to break up stones in the kidney. The patient is placed on a special table and x-ray is used to focus waterborne shock waves through a localized area of the body onto the kidney stones. The stones are broken up into small pieces, which can then pass through urination. Some patient may require a ureteral stent after the procedure. Occasionally, the patient may require a second procedure to remove small stone fragments from the ureter. The success of an ESWL depends on the size and location of the stone, and also the hardness of the stone. The patients height and weight should also be considered before performing this surgery. How long does surgery take? Surgery will take 1-2 hours to complete. Will I have to stay in the hospital? No. This is an outpatient procedure, so you should be able to go home the same day that the procedure is performed. Are there risks with an ESWL procedure? There are risks with any surgical procedure. Risks of ESWL in particular include infection, bleeding, and difficulty urinating. Steinstrasse can occur when several stones fragments line up in the ureter and block the flow of urine. General anesthesia also has the risk of breathing problems, heart attack and stroke in patients who are high risk. Medications:  - Your information packet will contain a list of medications that need to be stopped before surgery. - Do not take Aspirin, Motrin or Ibuprofen for 2 weeks before surgery. - Stop taking all herbal remedies 2 weeks before your surgery.     - Please make certain that we know if you take medication that affected bleeding or is a blood thinner.  Examples of these include Coumadin, Warfarin or Plavix. A safe plan will need to be made about how and when you take this medication near the time of your surgery. The Day of Surgery  - Please report to the designated area at your confirmed arrival time. -  Before you are taken to the operating room, you will change into a gown and have an IV started. - An anesthesiologist will come speak with you about the surgery and answer any questions that you may have. - Your family may stay with you in the pre-op area until you are taken to the operating room. Home Going Instructions: Activity: You are encouraged to walk every day, increasing the distance each day. You may go up and down steps, but please rest when you are tired. - Do NOT drive for 2-3 weeks after surgery or until you are not taking pain medications. You may ride in a car or plane. Be sure to get up and walk every 1-2 hours when traveling long distances. - Avoid strenuous activity for 2-4 weeks after surgery (running, jumping, lifting more than 10 lbs.)    Diet and Fluid Intake: Eating a well balanced diet is important. If you were on a specific diet before your surgery, you should return to that diet. Otherwise, there are no diet restrictions after surgery. Do NOT drink alcoholic beverages while taking pain medications. Drink at least 8 glasses of fluid per day, preferably water. Bowel Management: Constipation sometimes occurs after surgery, especially when taking pain medication. Eating a well-balanced diet and maintaining a good fluid intake are often all that is necessary to return to you pre-surgical bowel regimen. It is important not to strain excessively when having a bowel movement for the first several weeks following your surgery. A stool softener such as Colace may be helpful. You can purchase stool softeners without a prescription at your local drug store.    If a stool softener is not enough to relieve your constipation, you may try taking Milk of Magnesia. You may use over the counter medicine, such a Gas-X, if you experience gas pains after surgery. Ureteral Stent: Most people experience some discomfort with a stent in place. Common symptoms include back pain, urinary frequency and urgency. You may see some blood off and on in your urine, especially after you are active. While these symptoms are common with a stent, if you are having a lot of pain, please call our office to discuss your symptoms. Showering: You may shower when you get home from the hospital. Please avoid swimming, hot tubs and baths for 2-3 weeks after your procedure. Pain Medications: Your doctor will prescribe the appropriate pain medication for you. Take these pills only as directed and only if you need them. If you are experiencing mild discomfort, you may take Tylenol or Ibuprofen. NEVER mix alcohol with prescription pain medications. Pain medication may make you drowsy. Do not take pain medication when doing any activity that requires coordination, such as driving. Infection: Report the following warning signs of infection to your doctor immediately:  A temperature of 101 degrees or greater  Unable to urinate. Sudden onset of increased pain or tenderness   Increased amount of blood in the urine, or passing large blood clots    Miscellaneous  It is normal for you to have minor discomfort after your surgery. However, if there are any significant changes in your condition--such as shortness of breath, difficulty breathing, or pain or uneven swelling in your legs--please go to the Emergency Room. Return to Work: If you work in an office and are not lifting or doing strenuous activity, you may return to work when comfortable. If your work involves strenuous activity, you may need more time before returning to work. If necessary, our office can provide a letter stating the date that you may return to work.     Follow-up Appointments    Follow-up appointments will be scheduled by our office. If you have any questions, please call [phone]    Please obtain a XRAY 1 week prior to your appointment.

## 2022-08-03 NOTE — ANESTHESIA POSTPROCEDURE EVALUATION
Department of Anesthesiology  Postprocedure Note    Patient: Bobby Krugre  MRN: [id number]  YOB: 1945  Date of evaluation: 8/3/2022      Procedure Summary     Date: 08/03/22 Room / Location: [facility] OR 07 / [facility]    Anesthesia Start: 5646 Anesthesia Stop: 1231    Procedure: RIGHT ESWL EXTRACORPOREAL SHOCK WAVE LITHOTRIPSY CYSTOSCOPY RIGHT STENT INSERTION (Right) Diagnosis:       Kidney stone      (Kidney stone [N20.0])    Surgeons: Adri Caruso MD Responsible Provider: Peyton Richter MD    Anesthesia Type: general ASA Status: 3          Anesthesia Type: No value filed.     Dulce Phase I: Dulce Score: 10    Dulce Phase II:        Anesthesia Post Evaluation    Patient location during evaluation: bedside  Patient participation: complete - patient participated  Level of consciousness: sleepy but conscious  Pain score: 1  Airway patency: patent  Nausea & Vomiting: no vomiting and no nausea  Complications: no  Cardiovascular status: blood pressure returned to baseline  Respiratory status: acceptable and room air  Hydration status: stable

## 2022-08-03 NOTE — ANESTHESIA PRE PROCEDURE
Department of Anesthesiology  Preprocedure Note       Name:  Alice Zhu   Age:  68 y.o.  :  1945                                          MRN:  [id number]         Date:  8/3/2022      Surgeon: Ila Bey):  Torey Ruby MD    Procedure: Procedure(s):  RIGHT ESWL EXTRACORPOREAL SHOCK WAVE LITHOTRIPSY CYSTOSCOPY RIGHT STENT INSERTION    Medications prior to admission:   Prior to Admission medications    Medication Sig Start Date End Date Taking? Authorizing Provider   Calcium Carbonate Antacid (TUMS PO) Take by mouth as needed   Yes Historical Provider, MD   ondansetron (ZOFRAN-ODT) 4 MG disintegrating tablet Take 1 tablet by mouth 3 times daily as needed for Nausea or Vomiting 22   Lauren Lombardo DO   aspirin 81 MG chewable tablet Take 81 mg by mouth daily    Historical Provider, MD   Biotin w/ Vitamins C & E (HAIR/SKIN/NAILS PO) Take by mouth    Historical Provider, MD   Calcium Carb-Cholecalciferol (TGT CALCIUM DIETARY SUPPLEMENT PO) Take by mouth    Historical Provider, MD   GILLIAN PO Take 300 mg by mouth    Historical Provider, MD   Chromium Picolinate 200 MCG TABS Take by mouth    Historical Provider, MD   ODORLESS GARLIC PO Take by mouth    Historical Provider, MD   Polyvinyl Alcohol (LUBRICANT DROPS OP) Apply to eye    Historical ProviderMD HARTLEY Silver biotics    Historical Provider, MD   triamcinolone (KENALOG) 0.1 % cream Apply topically 2 times daily. Patient taking differently: Apply topically as needed Apply topically 2 times daily.  21   Nicholas Carter MD   Omega-3 Fatty Acids (FISH OIL) 1000 MG CAPS Take 1,000 mg by mouth daily    Historical Provider, MD   Cholecalciferol (VITAMIN D3) 5000 units TABS Take 1 tablet by mouth daily    Historical Provider, MD   POLICOSANOL PO Take by mouth    Historical Provider, MD   Turmeric 500 MG CAPS Take by mouth    Historical Provider, MD   MAGNESIUM PO Take by mouth    Historical Provider, MD   Multiple Vitamins-Minerals with OTC med \"Thyromend\" which could not be found in the medication search on 8/2/2022       Past Surgical History:        Procedure Laterality Date    EYE SURGERY Bilateral     cataracts    LITHOTRIPSY Left 04/14/2016    OTHER SURGICAL HISTORY      [place] [address] [address]  06/27/2022    Removed 7/12/2022    TOTAL KNEE ARTHROPLASTY Right 2017       Social History:    Social History     Tobacco Use    Smoking status: Never    Smokeless tobacco: Never   Substance Use Topics    Alcohol use: No     Comment: occasional glass of wine                                Counseling given: Not Answered      Vital Signs (Current):   Vitals:    08/02/22 0826 08/03/22 0744   BP:  (!) 174/94   Pulse:  91   Resp:  16   Temp:  96.9 °F (36.1 °C)   TempSrc:  Temporal   SpO2:  95%   Weight: 203 lb (92.1 kg) 203 lb (92.1 kg)   Height: 5' 4\" (1.626 m) 5' 4\" (1.626 m)                                              BP Readings from Last 3 Encounters:   08/03/22 (!) 174/94   07/23/22 (!) 168/97   07/21/22 (!) 158/78       NPO Status: Time of last liquid consumption: 2230                        Time of last solid consumption: 1600                        Date of last liquid consumption: 08/02/22                        Date of last solid food consumption: 08/02/22    BMI:   Wt Readings from Last 3 Encounters:   08/03/22 203 lb (92.1 kg)   07/23/22 203 lb (92.1 kg)   07/21/22 203 lb (92.1 kg)     Body mass index is 34.84 kg/m².     CBC:   Lab Results   Component Value Date/Time    WBC 9.1 07/21/2022 01:18 PM    RBC 4.56 07/21/2022 01:18 PM    HGB 13.9 07/21/2022 01:18 PM    HCT 42.6 07/21/2022 01:18 PM    MCV 93.4 07/21/2022 01:18 PM    RDW 13.2 07/21/2022 01:18 PM     07/21/2022 01:18 PM       CMP:   Lab Results   Component Value Date/Time     07/21/2022 01:18 PM    K 4.1 07/21/2022 01:18 PM    K 3.9 06/28/2022 07:49 AM     07/21/2022 01:18 PM CO2 22 07/21/2022 01:18 PM    BUN 19 07/21/2022 01:18 PM    CREATININE 0.8 07/21/2022 01:18 PM    GFRAA >60 07/21/2022 01:18 PM    LABGLOM >60 07/21/2022 01:18 PM    GLUCOSE 117 07/21/2022 01:18 PM    PROT 6.9 07/21/2022 01:18 PM    CALCIUM 9.7 07/21/2022 01:18 PM    BILITOT 0.4 07/21/2022 01:18 PM    ALKPHOS 56 07/21/2022 01:18 PM    AST 18 07/21/2022 01:18 PM    ALT 18 07/21/2022 01:18 PM       POC Tests: No results for input(s): POCGLU, POCNA, POCK, POCCL, POCBUN, POCHEMO, POCHCT in the last 72 hours. Coags:   Lab Results   Component Value Date/Time    PROTIME 9.8 03/12/2016 08:35 AM    INR 0.9 03/12/2016 08:35 AM    APTT 32.3 03/12/2016 08:35 AM       HCG (If Applicable): No results found for: PREGTESTUR, PREGSERUM, HCG, HCGQUANT     ABGs: No results found for: PHART, PO2ART, VKK7VIO, GTC4TIZ, BEART, G8ESTLKL     Type & Screen (If Applicable):  No results found for: LABABO, LABRH    Drug/Infectious Status (If Applicable):  No results found for: HIV, HEPCAB    COVID-19 Screening (If Applicable): No results found for: COVID19        Anesthesia Evaluation  Patient summary reviewed and Nursing notes reviewed no history of anesthetic complications:   Airway: Mallampati: III  TM distance: >3 FB   Neck ROM: full  Mouth opening: > = 3 FB   Dental:    (+) caps      Pulmonary:Negative Pulmonary ROS breath sounds clear to auscultation      (-) not a current smoker                           Cardiovascular:  Exercise tolerance: good (>4 METS),   (+) hypertension:,         Rhythm: regular  Rate: normal           Beta Blocker:  Not on Beta Blocker         Neuro/Psych:   (+) neuromuscular disease:,              ROS comment: scoliosis GI/Hepatic/Renal:   (+) renal disease: kidney stones,           Endo/Other:    (+) : arthritis: OA., .                 Abdominal:             Vascular: negative vascular ROS. Other Findings:           Anesthesia Plan      general     ASA 3       Induction: intravenous.       Anesthetic plan and risks discussed with patient and spouse.                         Ifeaniy Chow MD   8/3/2022        Agree with above assessment/plan    Michael Darden, CRNA

## 2022-08-03 NOTE — OP NOTE
Operative Note      Patient: Davida Jones  YOB: 1945  MRN: [id number]    Date of Procedure: 8/3/2022    Pre-Op Diagnosis: Kidney stone [N20.0]    Post-Op Diagnosis: Same       Procedure(s):  RIGHT ESWL EXTRACORPOREAL SHOCK WAVE LITHOTRIPSY CYSTOSCOPY RIGHT STENT INSERTION    Surgeon(s):  Stuart Hernandez MD    Assistant:   * No surgical staff found *    Anesthesia: General    Estimated Blood Loss (mL): Minimal    Complications: None    Specimens:   * No specimens in log *    Implants:  Implant Name Type Inv. Item Serial No.  Lot No. LRB No. Used Action   STENT URET 6FR L26CM PERCFLX HYDR+ DBL PGTL THRD 2 - [id number]  STENT URET 6FR L26CM PERCFLX HYDR+ DBL PGTL THRD 2  [employer] [facility]- [id number] Right 1 Implanted         Drains: * No LDAs found *    Findings: right renal stone    Detailed Description of Procedure:         67 yo  who presents with right renal calculus. Pt was counseled on all of the different treatment options. All R/B/ A were discussed with the patient. Informed consent was obtained and signed    Operation:     Pt was wheeled back into the operative suite. Identified by name band and number. Pt was then transferred onto the operative table. Anesthesia was delivered without complication. Surgical time out was taken and all in the room were in agreement. Pt was placed in the dorsal lithotomy position prepped and draped in normal sterile fashion. A 0.035 glide wire was then placed into the right ureteral oriface. This was seen to terminate in the kidney on KUB. A 6 x 26 JJ stent was then placed and found to have good curl in the renal pelvis and the urinary bladder. The bladder was then drained. The lithotriper was moved into position. The stone was identified. A total of 3000 shocks were delivered to the renal calculus at a rate of 90 shocks per minute. There was good fragmentation seen on KUB.      The patient tolerated the procedure well was awoken from anesthesia and taken to the PACU in stable condition. PLAN:     Pt will obtain a KUB in 2 weeks. Strain urine.      Claudene Feinstein, MD      Electronically signed by Altaf Peralta MD on 8/3/2022 at 10:38 AM

## 2022-08-15 ENCOUNTER — HOSPITAL ENCOUNTER (OUTPATIENT)
Age: 77
Discharge: HOME | End: 2022-08-17
Payer: MEDICARE

## 2022-08-15 ENCOUNTER — HOSPITAL ENCOUNTER (OUTPATIENT)
Dept: GENERAL RADIOLOGY | Age: 77
Discharge: HOME | End: 2022-08-17
Payer: MEDICARE

## 2022-08-15 DIAGNOSIS — N20.1 CALCULUS OF URETER: ICD-10-CM

## 2022-08-15 PROCEDURE — 74018 RADEX ABDOMEN 1 VIEW: CPT

## 2022-09-07 ENCOUNTER — OFFICE VISIT (OUTPATIENT)
Dept: FAMILY MEDICINE CLINIC | Age: 77
End: 2022-09-07
Payer: MEDICARE

## 2022-09-07 VITALS
HEART RATE: 81 BPM | WEIGHT: 202.4 LBS | SYSTOLIC BLOOD PRESSURE: 150 MMHG | TEMPERATURE: 97.5 F | OXYGEN SATURATION: 98 % | BODY MASS INDEX: 34.56 KG/M2 | DIASTOLIC BLOOD PRESSURE: 92 MMHG | HEIGHT: 64 IN | RESPIRATION RATE: 17 BRPM

## 2022-09-07 DIAGNOSIS — E03.9 ACQUIRED HYPOTHYROIDISM: ICD-10-CM

## 2022-09-07 DIAGNOSIS — E03.9 ACQUIRED HYPOTHYROIDISM: Primary | ICD-10-CM

## 2022-09-07 DIAGNOSIS — Z91.199 NON COMPLIANCE WITH MEDICAL TREATMENT: ICD-10-CM

## 2022-09-07 DIAGNOSIS — N20.1 URETERAL STONE: ICD-10-CM

## 2022-09-07 DIAGNOSIS — I10 ESSENTIAL HYPERTENSION: ICD-10-CM

## 2022-09-07 LAB
CHOLESTEROL, TOTAL: 326 MG/DL (ref 0–199)
CREATININE URINE: 68 MG/DL (ref 29–226)
HDLC SERPL-MCNC: 72 MG/DL
LDL CHOLESTEROL CALCULATED: 213 MG/DL (ref 0–99)
MICROALBUMIN UR-MCNC: 13.1 MG/L
MICROALBUMIN/CREAT UR-RTO: 19.3 (ref 0–30)
TRIGL SERPL-MCNC: 205 MG/DL (ref 0–149)
VLDLC SERPL CALC-MCNC: 41 MG/DL

## 2022-09-07 PROCEDURE — 99214 OFFICE O/P EST MOD 30 MIN: CPT | Performed by: FAMILY MEDICINE

## 2022-09-07 PROCEDURE — G8400 PT W/DXA NO RESULTS DOC: HCPCS | Performed by: FAMILY MEDICINE

## 2022-09-07 PROCEDURE — 1090F PRES/ABSN URINE INCON ASSESS: CPT | Performed by: FAMILY MEDICINE

## 2022-09-07 PROCEDURE — 1123F ACP DISCUSS/DSCN MKR DOCD: CPT | Performed by: FAMILY MEDICINE

## 2022-09-07 PROCEDURE — 1036F TOBACCO NON-USER: CPT | Performed by: FAMILY MEDICINE

## 2022-09-07 PROCEDURE — G8417 CALC BMI ABV UP PARAM F/U: HCPCS | Performed by: FAMILY MEDICINE

## 2022-09-07 PROCEDURE — G8427 DOCREV CUR MEDS BY ELIG CLIN: HCPCS | Performed by: FAMILY MEDICINE

## 2022-09-07 RX ORDER — TRIAMCINOLONE ACETONIDE 1 MG/G
CREAM TOPICAL
Qty: 453.6 G | Refills: 2 | Status: SHIPPED | OUTPATIENT
Start: 2022-09-07

## 2022-09-07 RX ORDER — CHROMIUM 200 MCG
TABLET ORAL
COMMUNITY

## 2022-09-07 RX ORDER — CLINDAMYCIN HYDROCHLORIDE 150 MG/1
150 CAPSULE ORAL 3 TIMES DAILY
COMMUNITY

## 2022-09-07 ASSESSMENT — ENCOUNTER SYMPTOMS
ABDOMINAL DISTENTION: 0
ANAL BLEEDING: 0
ABDOMINAL PAIN: 0
CHEST TIGHTNESS: 0
EYE REDNESS: 0
COUGH: 1
BACK PAIN: 1
PHOTOPHOBIA: 0
COLOR CHANGE: 0
WHEEZING: 0
SHORTNESS OF BREATH: 0

## 2022-09-07 NOTE — PROGRESS NOTES
OFFICE NOTE    22  Name: Alice Zhu  :1945   Sex:female   Age:77 y.o. SUBJECTIVE  Chief Complaint   Patient presents with    Hypertension       HPI was hospitilized for large ureteral stone. Had lithotripsy and stent recently removed. BP's have been elevated most recent readings. Says saw Dr. Alesha Domínguez who had her on 4 meds. Was allergic to \"all of them\" and just stopped going. Was also tried unsuccessfully on thyroid which she also says she wasl allergic to. Does not want to go on meds    Review of Systems   Constitutional:  Positive for fatigue. Negative for activity change and unexpected weight change. HENT:  Positive for congestion and hearing loss. Negative for postnasal drip. Eyes:  Negative for photophobia, redness and visual disturbance. Respiratory:  Positive for cough. Negative for chest tightness, shortness of breath and wheezing. Cardiovascular:  Positive for leg swelling. Negative for chest pain and palpitations. Gastrointestinal:  Negative for abdominal distention, abdominal pain and anal bleeding. Endocrine: Negative for polydipsia and polyuria. Genitourinary:  Positive for frequency and urgency. Negative for dyspareunia, dysuria and hematuria. Musculoskeletal:  Positive for arthralgias and back pain. Negative for myalgias. Skin:  Negative for color change, pallor and rash. Skin dry. Hair somewhat whispy   All other systems reviewed and are negative. Current Outpatient Medications:     Chromium 200 MCG TABS, Take by mouth, Disp: , Rfl:     Probiotic Product (PA PROBIOTIC COMPLEX PO), Take by mouth, Disp: , Rfl:     clindamycin (CLEOCIN) 150 MG capsule, Take 150 mg by mouth 3 times daily Before dental appointments, Disp: , Rfl:     triamcinolone (KENALOG) 0.1 % cream, Apply topically 2 times daily. , Disp: 453.6 g, Rfl: 2    aspirin 81 MG chewable tablet, Take 81 mg by mouth daily, Disp: , Rfl:     Biotin w/ Vitamins C & E (HAIR/SKIN/NAILS PO), Take Thyroid disease     Treated by pt with OTC med \"Thyromend\" which could not be found in the medication search on 8/2/2022     Past Surgical History:   Procedure Laterality Date    EYE SURGERY Bilateral     cataracts    LITHOTRIPSY Left 04/14/2016    LITHOTRIPSY Right 8/3/2022    RIGHT ESWL EXTRACORPOREAL SHOCK WAVE LITHOTRIPSY CYSTOSCOPY RIGHT STENT INSERTION performed by Roxane Rose MD at [facility]      has acupuncture tx with Dr. Urmila Carty in [address] [place] [address]  06/27/2022    Removed 7/12/2022    TOTAL KNEE ARTHROPLASTY Right 2017     Family History   Problem Relation Age of Onset    Ovarian Cancer Mother     Heart Failure Father     Other Father         AAA    No Known Problems Sister     Alcohol Abuse Brother     Stroke Sister         small    Arthritis Sister         TKR     Social History       Tobacco History       Smoking Status  Never      Smokeless Tobacco Use  Never              Alcohol History       Alcohol Use Status  No Comment  occasional glass of wine              Drug Use       Drug Use Status  No              Sexual Activity       Sexually Active  Not Asked Birth Control/Protection  Post-menopausal                    OBJECTIVE  Vitals:    09/07/22 1310 09/07/22 1323   BP: (!) 160/100 (!) 150/92   Pulse: 81    Resp: 17    Temp: 97.5 °F (36.4 °C)    TempSrc: Temporal    SpO2: 98%    Weight: 202 lb 6.4 oz (91.8 kg)    Height: 5' 4\" (1.626 m)         Body mass index is 34.74 kg/m².     Orders Placed This Encounter   Procedures    Lipid Panel     Standing Status:   Future     Number of Occurrences:   1     Standing Expiration Date:   9/7/2023    T4, Free     Standing Status:   Future     Number of Occurrences:   1     Standing Expiration Date:   9/7/2023    Thyroid Peroxidase Antibody     Standing Status:   Future     Number of Occurrences:   1     Standing Expiration Date:   9/7/2023    Microalbumin / Creatinine Urine Ratio     Standing Status:   Future     Number of Occurrences:   1     Standing Expiration Date:   9/7/2023        EXAM   Physical Exam  Vitals and nursing note reviewed. Constitutional:       Appearance: Normal appearance. She is obese. HENT:      Right Ear: Tympanic membrane and external ear normal.      Left Ear: Tympanic membrane and external ear normal.      Nose: Congestion present. Mouth/Throat:      Pharynx: Oropharynx is clear. No posterior oropharyngeal erythema. Eyes:      Conjunctiva/sclera: Conjunctivae normal.      Pupils: Pupils are equal, round, and reactive to light. Neck:      Vascular: No carotid bruit. Cardiovascular:      Rate and Rhythm: Normal rate and regular rhythm. Heart sounds: No murmur heard. Pulmonary:      Effort: Pulmonary effort is normal.      Breath sounds: Normal breath sounds. Abdominal:      General: Bowel sounds are normal.      Palpations: There is no mass. Tenderness: There is no abdominal tenderness. Musculoskeletal:         General: No swelling or tenderness. Cervical back: No tenderness. Right lower leg: Edema present. Left lower leg: Edema present. Lymphadenopathy:      Cervical: No cervical adenopathy. Skin:     Coloration: Skin is not jaundiced or pale. Findings: No bruising or rash. Neurological:      General: No focal deficit present. Mental Status: She is alert and oriented to person, place, and time. Sensory: No sensory deficit. Motor: Weakness present. Coordination: Coordination normal.      Gait: Gait normal.   Psychiatric:         Mood and Affect: Mood normal.         Behavior: Behavior normal.         El Rae was seen today for hypertension. Diagnoses and all orders for this visit:    Acquired hypothyroidism  -     Lipid Panel; Future  -     T4, Free; Future  -     Thyroid Peroxidase Antibody; Future  -     Microalbumin / Creatinine Urine Ratio; Future  Discussed this at some length.  Perhaps starting low and going slow will work  Essential hypertension  May be component of white coat but believe her BP is elevated. Will get BW but will need treated. Would probably start out with ARB  Ureteral stone  Stent removed, stone gone. Following this with Urology  Non compliance with medical treatment    Other orders  -     triamcinolone (KENALOG) 0.1 % cream; Apply topically 2 times daily. Needs to have BW done today or tomorrow. No follow-ups on file.     Electronically signed by Melba Ochoa MD on 9/7/22 at 1:34 PM EDT

## 2022-09-08 LAB — T4 FREE: 0.87 NG/DL (ref 0.93–1.7)

## 2022-09-10 LAB — THYROID PEROXIDASE (TPO) ABS: <4 IU/ML (ref 0–25)

## 2023-08-25 ENCOUNTER — HOSPITAL ENCOUNTER (EMERGENCY)
Age: 78
Discharge: HOME | End: 2023-08-25
Payer: MEDICARE

## 2023-08-25 ENCOUNTER — APPOINTMENT (OUTPATIENT)
Dept: CT IMAGING | Age: 78
End: 2023-08-25
Payer: MEDICARE

## 2023-08-25 VITALS
DIASTOLIC BLOOD PRESSURE: 82 MMHG | SYSTOLIC BLOOD PRESSURE: 142 MMHG | TEMPERATURE: 97.8 F | OXYGEN SATURATION: 99 % | RESPIRATION RATE: 16 BRPM | BODY MASS INDEX: 35.02 KG/M2 | HEART RATE: 78 BPM | WEIGHT: 204 LBS

## 2023-08-25 DIAGNOSIS — N30.00 ACUTE CYSTITIS WITHOUT HEMATURIA: ICD-10-CM

## 2023-08-25 DIAGNOSIS — R10.9 LEFT FLANK PAIN: Primary | ICD-10-CM

## 2023-08-25 DIAGNOSIS — N20.0 RENAL CALCULUS, LEFT: ICD-10-CM

## 2023-08-25 LAB
ALBUMIN SERPL-MCNC: 4.5 G/DL (ref 3.5–5.2)
ALP SERPL-CCNC: 58 U/L (ref 35–104)
ALT SERPL-CCNC: 18 U/L (ref 0–32)
ANION GAP SERPL CALCULATED.3IONS-SCNC: 12 MMOL/L (ref 7–16)
AST SERPL-CCNC: 17 U/L (ref 0–31)
BACTERIA URNS QL MICRO: ABNORMAL
BASOPHILS # BLD: 0.04 K/UL (ref 0–0.2)
BASOPHILS NFR BLD: 1 % (ref 0–2)
BILIRUB SERPL-MCNC: 0.5 MG/DL (ref 0–1.2)
BILIRUB UR QL STRIP: NEGATIVE
BUN SERPL-MCNC: 18 MG/DL (ref 6–23)
CALCIUM SERPL-MCNC: 9.9 MG/DL (ref 8.6–10.2)
CHLORIDE SERPL-SCNC: 105 MMOL/L (ref 98–107)
CLARITY UR: CLEAR
CO2 SERPL-SCNC: 25 MMOL/L (ref 22–29)
COLOR UR: YELLOW
CREAT SERPL-MCNC: 1 MG/DL (ref 0.5–1)
EOSINOPHIL # BLD: 0.02 K/UL (ref 0.05–0.5)
EOSINOPHILS RELATIVE PERCENT: 0 % (ref 0–6)
ERYTHROCYTE [DISTWIDTH] IN BLOOD BY AUTOMATED COUNT: 13.1 % (ref 11.5–15)
GFR SERPL CREATININE-BSD FRML MDRD: >60 ML/MIN/1.73M2
GLUCOSE SERPL-MCNC: 130 MG/DL (ref 74–99)
GLUCOSE UR STRIP-MCNC: NEGATIVE MG/DL
HCT VFR BLD AUTO: 45.7 % (ref 34–48)
HGB BLD-MCNC: 15.1 G/DL (ref 11.5–15.5)
HGB UR QL STRIP.AUTO: ABNORMAL
IMM GRANULOCYTES # BLD AUTO: <0.03 K/UL (ref 0–0.58)
IMM GRANULOCYTES NFR BLD: 0 % (ref 0–5)
KETONES UR STRIP-MCNC: NEGATIVE MG/DL
LEUKOCYTE ESTERASE UR QL STRIP: ABNORMAL
LYMPHOCYTES NFR BLD: 0.73 K/UL (ref 1.5–4)
LYMPHOCYTES RELATIVE PERCENT: 10 % (ref 20–42)
MCH RBC QN AUTO: 30.4 PG (ref 26–35)
MCHC RBC AUTO-ENTMCNC: 33 G/DL (ref 32–34.5)
MCV RBC AUTO: 92.1 FL (ref 80–99.9)
MONOCYTES NFR BLD: 0.62 K/UL (ref 0.1–0.95)
MONOCYTES NFR BLD: 8 % (ref 2–12)
NEUTROPHILS NFR BLD: 82 % (ref 43–80)
NEUTS SEG NFR BLD: 6.3 K/UL (ref 1.8–7.3)
NITRITE UR QL STRIP: POSITIVE
PH UR STRIP: 6 [PH] (ref 5–9)
PLATELET # BLD AUTO: 286 K/UL (ref 130–450)
PMV BLD AUTO: 9.3 FL (ref 7–12)
POTASSIUM SERPL-SCNC: 4.5 MMOL/L (ref 3.5–5)
PROT SERPL-MCNC: 7.4 G/DL (ref 6.4–8.3)
PROT UR STRIP-MCNC: NEGATIVE MG/DL
RBC # BLD AUTO: 4.96 M/UL (ref 3.5–5.5)
RBC #/AREA URNS HPF: ABNORMAL /HPF
SODIUM SERPL-SCNC: 142 MMOL/L (ref 132–146)
SP GR UR STRIP: 1.01 (ref 1–1.03)
UROBILINOGEN UR STRIP-ACNC: 0.2 EU/DL (ref 0–1)
WBC #/AREA URNS HPF: ABNORMAL /HPF
WBC OTHER # BLD: 7.7 K/UL (ref 4.5–11.5)

## 2023-08-25 PROCEDURE — 85025 COMPLETE CBC W/AUTO DIFF WBC: CPT

## 2023-08-25 PROCEDURE — 87088 URINE BACTERIA CULTURE: CPT

## 2023-08-25 PROCEDURE — 99285 EMERGENCY DEPT VISIT HI MDM: CPT

## 2023-08-25 PROCEDURE — 2580000003 HC RX 258: Performed by: PHYSICIAN ASSISTANT

## 2023-08-25 PROCEDURE — 6360000004 HC RX CONTRAST MEDICATION: Performed by: RADIOLOGY

## 2023-08-25 PROCEDURE — 74177 CT ABD & PELVIS W/CONTRAST: CPT

## 2023-08-25 PROCEDURE — 6360000002 HC RX W HCPCS: Performed by: PHYSICIAN ASSISTANT

## 2023-08-25 PROCEDURE — 81001 URINALYSIS AUTO W/SCOPE: CPT

## 2023-08-25 PROCEDURE — 80053 COMPREHEN METABOLIC PANEL: CPT

## 2023-08-25 PROCEDURE — 87086 URINE CULTURE/COLONY COUNT: CPT

## 2023-08-25 PROCEDURE — 96374 THER/PROPH/DIAG INJ IV PUSH: CPT

## 2023-08-25 RX ORDER — CEFDINIR 300 MG/1
300 CAPSULE ORAL 2 TIMES DAILY
Qty: 20 CAPSULE | Refills: 0 | Status: SHIPPED | OUTPATIENT
Start: 2023-08-25 | End: 2023-09-04

## 2023-08-25 RX ORDER — 0.9 % SODIUM CHLORIDE 0.9 %
1000 INTRAVENOUS SOLUTION INTRAVENOUS ONCE
Status: COMPLETED | OUTPATIENT
Start: 2023-08-25 | End: 2023-08-25

## 2023-08-25 RX ADMIN — IOPAMIDOL 75 ML: 755 INJECTION, SOLUTION INTRAVENOUS at 12:48

## 2023-08-25 RX ADMIN — SODIUM CHLORIDE 1000 ML: 9 INJECTION, SOLUTION INTRAVENOUS at 11:43

## 2023-08-25 RX ADMIN — WATER 2000 MG: 1 INJECTION INTRAMUSCULAR; INTRAVENOUS; SUBCUTANEOUS at 14:12

## 2023-08-25 ASSESSMENT — PAIN - FUNCTIONAL ASSESSMENT: PAIN_FUNCTIONAL_ASSESSMENT: NONE - DENIES PAIN

## 2023-08-25 NOTE — ED NOTES
Discharge instructions given. Patient verbalizes understanding. No other noted or stated problems at this time. Patient will follow up with primary care.       [facility], RN  08/25/23 4069

## 2023-08-25 NOTE — ED NOTES
Department of Emergency Medicine  FIRST PROVIDER TRIAGE NOTE             Independent MLP           8/25/23  10:09 AM EDT    Date of Encounter: 8/25/23   MRN: [id number]      HPI: Murphy Paulino is a 66 y.o. female who presents to the ED for Flank Pain (Left sided, onset 0500 this AM.  Hx of kidney stones)   Left sided flank pain starting this morning. States the pain woke her up. Denies urinary symptoms. Does have a history of stones. ROS: Negative for cp or sob. PE: Gen Appearance/Constitutional: alert  HEENT: NC/NT. PERRLA,  Airway patent. Initial Plan of Care: All treatment areas with department are currently occupied. Plan to order/Initiate the following while awaiting opening in ED.   Initiate Treatment-Testing, Proceed toTreatment Area When Bed Available for ED Attending/MLP to Continue Care    Electronically signed by DAHIANA Boo CNP   DD: 8/25/23      DAHIANA Boo CNP  08/25/23 1009

## 2023-08-25 NOTE — ED PROVIDER NOTES
Independent IDALIA Visit. [facility]  ED  Encounter Note  Admit Date/RoomTime: 2023 11:11 AM  ED Room:   NAME: Gael Hammonds  : 1945  MRN: [id number]  PCP: Amado Marcos MD    CHIEF COMPLAINT     Flank Pain (Left sided, onset 0500 this AM.  Hx of kidney stones)    HISTORY OF PRESENT ILLNESS        Gael Hammonds is a 66 y.o. female who presents to the ED by private vehicle for left flank pain, beginning 7 hour(s) ago. The complaint has been persistent and is mild in severity. The patient states that she awoke this morning to an aching pain in the left flank area. The pain has since decreased. She admits to experiencing nausea at onset but hasn't vomited. She denies any fever, chills, urinary urgency, frequency, hematuria, or burning while urinating. She admits to a history of kidney stones, the most recent being last year which resulted in an infection and had to take antibiotics for 10 days. She states that she is otherwise healthy and tries to drink plenty of fluids to prevent more of these episodes. REVIEW OF SYSTEMS     Pertinent positives and negatives are stated within HPI, all other systems reviewed and are negative. Past Medical History:  has a past medical history of Breast cyst, right, E coli bacteremia, Hypertension, Open-angle glaucoma, Osteoarthritis, Renal calculus, and Thyroid disease. Surgical History:  has a past surgical history that includes ovarian cyst removal; Lithotripsy (Left, 2016); other surgical history; picc line insertion nurse (2022); eye surgery (Bilateral); Total knee arthroplasty (Right, ); and Lithotripsy (Right, 8/3/2022). Social History:  reports that she has never smoked. She has never used smokeless tobacco. She reports that she does not drink alcohol and does not use drugs.   Family History: family history includes Alcohol Abuse in her brother; Arthritis in her sister;

## 2023-08-27 LAB
MICROORGANISM SPEC CULT: ABNORMAL
SPECIMEN DESCRIPTION: ABNORMAL

## 2023-09-06 ENCOUNTER — OFFICE VISIT (OUTPATIENT)
Dept: FAMILY MEDICINE CLINIC | Age: 78
End: 2023-09-06
Payer: MEDICARE

## 2023-09-06 VITALS
DIASTOLIC BLOOD PRESSURE: 82 MMHG | WEIGHT: 202 LBS | BODY MASS INDEX: 35.79 KG/M2 | HEART RATE: 84 BPM | SYSTOLIC BLOOD PRESSURE: 136 MMHG | HEIGHT: 63 IN | TEMPERATURE: 98 F | OXYGEN SATURATION: 97 %

## 2023-09-06 DIAGNOSIS — N13.30 HYDRONEPHROSIS OF LEFT KIDNEY: ICD-10-CM

## 2023-09-06 DIAGNOSIS — R33.9 URINARY RETENTION: Primary | ICD-10-CM

## 2023-09-06 DIAGNOSIS — R39.15 URINARY URGENCY: ICD-10-CM

## 2023-09-06 DIAGNOSIS — R33.9 URINARY RETENTION: ICD-10-CM

## 2023-09-06 LAB
BILIRUBIN, POC: NORMAL
BLOOD URINE, POC: NORMAL
CLARITY, POC: CLEAR
COLOR, POC: CLEAR
GLUCOSE URINE, POC: NORMAL
KETONES, POC: NORMAL
LEUKOCYTE EST, POC: NORMAL
NITRITE, POC: NORMAL
PH, POC: 6
PROTEIN, POC: NORMAL
SPECIFIC GRAVITY, POC: <=1.005
UROBILINOGEN, POC: 0.2

## 2023-09-06 PROCEDURE — 1090F PRES/ABSN URINE INCON ASSESS: CPT | Performed by: PHYSICIAN ASSISTANT

## 2023-09-06 PROCEDURE — 1036F TOBACCO NON-USER: CPT | Performed by: PHYSICIAN ASSISTANT

## 2023-09-06 PROCEDURE — G8427 DOCREV CUR MEDS BY ELIG CLIN: HCPCS | Performed by: PHYSICIAN ASSISTANT

## 2023-09-06 PROCEDURE — 81002 URINALYSIS NONAUTO W/O SCOPE: CPT | Performed by: PHYSICIAN ASSISTANT

## 2023-09-06 PROCEDURE — G8400 PT W/DXA NO RESULTS DOC: HCPCS | Performed by: PHYSICIAN ASSISTANT

## 2023-09-06 PROCEDURE — G8417 CALC BMI ABV UP PARAM F/U: HCPCS | Performed by: PHYSICIAN ASSISTANT

## 2023-09-06 PROCEDURE — 1123F ACP DISCUSS/DSCN MKR DOCD: CPT | Performed by: PHYSICIAN ASSISTANT

## 2023-09-06 PROCEDURE — 3075F SYST BP GE 130 - 139MM HG: CPT | Performed by: PHYSICIAN ASSISTANT

## 2023-09-06 PROCEDURE — 3079F DIAST BP 80-89 MM HG: CPT | Performed by: PHYSICIAN ASSISTANT

## 2023-09-06 PROCEDURE — 99204 OFFICE O/P NEW MOD 45 MIN: CPT | Performed by: PHYSICIAN ASSISTANT

## 2023-09-06 ASSESSMENT — ENCOUNTER SYMPTOMS
BACK PAIN: 0
ABDOMINAL PAIN: 0
PHOTOPHOBIA: 0
SORE THROAT: 0
COUGH: 0
VOMITING: 0
DIARRHEA: 0
NAUSEA: 0
SHORTNESS OF BREATH: 0

## 2023-09-06 NOTE — PROGRESS NOTES
normal.      Left Ear: Tympanic membrane normal.      Nose: Nose normal.      Mouth/Throat:      Mouth: Mucous membranes are moist.      Pharynx: Oropharynx is clear. Eyes:      Conjunctiva/sclera: Conjunctivae normal.      Pupils: Pupils are equal, round, and reactive to light. Cardiovascular:      Rate and Rhythm: Normal rate and regular rhythm. Pulmonary:      Effort: Pulmonary effort is normal. No respiratory distress. Breath sounds: Normal breath sounds. Abdominal:      General: Bowel sounds are normal. There is no distension. Palpations: Abdomen is soft. Tenderness: There is no abdominal tenderness. There is no right CVA tenderness, left CVA tenderness or guarding. Musculoskeletal:         General: Normal range of motion. Cervical back: Normal range of motion. No rigidity. Lymphadenopathy:      Cervical: No cervical adenopathy. Skin:     General: Skin is warm and dry. Neurological:      General: No focal deficit present. Mental Status: She is alert and oriented to person, place, and time. Mental status is at baseline. Psychiatric:         Mood and Affect: Mood normal.         Behavior: Behavior normal.         Thought Content: Thought content normal.         Judgment: Judgment normal.         Testing:       IMPRESSION:  There is a nonobstructing stones seen at the midpole and another at the upper  pole renal papilla left kidney. Mild hydronephrosis left kidney with inflammatory stranding noted left UPJ. Obstructing lesion is not identified. Findings could relate to recent  passage of stone. If there is continued concern recommend repeat CT now to  evaluate passage of contrast into the collecting system and ureters. Sigmoid diverticulosis without evidence of acute inflammatory change. Levoscoliosis thoracolumbar junction with associated degenerative changes.     Results for orders placed or performed in visit on 09/06/23   POCT Urinalysis no Micro

## 2023-09-07 LAB
CULTURE: NO GROWTH
SPECIMEN DESCRIPTION: NORMAL

## 2023-09-25 ENCOUNTER — OFFICE VISIT (OUTPATIENT)
Dept: FAMILY MEDICINE CLINIC | Age: 78
End: 2023-09-25
Payer: MEDICARE

## 2023-09-25 VITALS
OXYGEN SATURATION: 98 % | RESPIRATION RATE: 18 BRPM | HEIGHT: 63 IN | WEIGHT: 198.8 LBS | BODY MASS INDEX: 35.22 KG/M2 | TEMPERATURE: 97.7 F | SYSTOLIC BLOOD PRESSURE: 124 MMHG | HEART RATE: 74 BPM | DIASTOLIC BLOOD PRESSURE: 82 MMHG

## 2023-09-25 DIAGNOSIS — N20.0 NEPHROLITHIASIS: ICD-10-CM

## 2023-09-25 DIAGNOSIS — E66.01 SEVERE OBESITY (BMI 35.0-39.9) WITH COMORBIDITY (HCC): ICD-10-CM

## 2023-09-25 DIAGNOSIS — Z00.00 INITIAL MEDICARE ANNUAL WELLNESS VISIT: Primary | ICD-10-CM

## 2023-09-25 DIAGNOSIS — E78.49 OTHER HYPERLIPIDEMIA: ICD-10-CM

## 2023-09-25 DIAGNOSIS — M15.9 PRIMARY OSTEOARTHRITIS INVOLVING MULTIPLE JOINTS: ICD-10-CM

## 2023-09-25 DIAGNOSIS — E03.9 HYPOTHYROIDISM, UNSPECIFIED TYPE: ICD-10-CM

## 2023-09-25 DIAGNOSIS — R73.9 HYPERGLYCEMIA: ICD-10-CM

## 2023-09-25 DIAGNOSIS — Z91.199 NON COMPLIANCE WITH MEDICAL TREATMENT: ICD-10-CM

## 2023-09-25 PROCEDURE — G0438 PPPS, INITIAL VISIT: HCPCS | Performed by: FAMILY MEDICINE

## 2023-09-25 PROCEDURE — 1123F ACP DISCUSS/DSCN MKR DOCD: CPT | Performed by: FAMILY MEDICINE

## 2023-09-25 PROCEDURE — 3074F SYST BP LT 130 MM HG: CPT | Performed by: FAMILY MEDICINE

## 2023-09-25 PROCEDURE — 3078F DIAST BP <80 MM HG: CPT | Performed by: FAMILY MEDICINE

## 2023-09-25 RX ORDER — TRIAMCINOLONE ACETONIDE 1 MG/G
CREAM TOPICAL
Qty: 453.6 G | Refills: 2 | Status: SHIPPED | OUTPATIENT
Start: 2023-09-25

## 2023-09-25 SDOH — ECONOMIC STABILITY: FOOD INSECURITY: WITHIN THE PAST 12 MONTHS, YOU WORRIED THAT YOUR FOOD WOULD RUN OUT BEFORE YOU GOT MONEY TO BUY MORE.: NEVER TRUE

## 2023-09-25 SDOH — ECONOMIC STABILITY: INCOME INSECURITY: HOW HARD IS IT FOR YOU TO PAY FOR THE VERY BASICS LIKE FOOD, HOUSING, MEDICAL CARE, AND HEATING?: NOT HARD AT ALL

## 2023-09-25 SDOH — ECONOMIC STABILITY: HOUSING INSECURITY
IN THE LAST 12 MONTHS, WAS THERE A TIME WHEN YOU DID NOT HAVE A STEADY PLACE TO SLEEP OR SLEPT IN A SHELTER (INCLUDING NOW)?: NO

## 2023-09-25 SDOH — ECONOMIC STABILITY: FOOD INSECURITY: WITHIN THE PAST 12 MONTHS, THE FOOD YOU BOUGHT JUST DIDN'T LAST AND YOU DIDN'T HAVE MONEY TO GET MORE.: NEVER TRUE

## 2023-09-25 ASSESSMENT — PATIENT HEALTH QUESTIONNAIRE - PHQ9
SUM OF ALL RESPONSES TO PHQ QUESTIONS 1-9: 0
1. LITTLE INTEREST OR PLEASURE IN DOING THINGS: 0
SUM OF ALL RESPONSES TO PHQ QUESTIONS 1-9: 0
2. FEELING DOWN, DEPRESSED OR HOPELESS: 0
SUM OF ALL RESPONSES TO PHQ9 QUESTIONS 1 & 2: 0

## 2023-09-25 NOTE — PROGRESS NOTES
OFFICE NOTE    23  Name: Juve Sanders  :1945   Sex:female   Age:78 y.o. SUBJECTIVE  Chief Complaint   Patient presents with    Medicare AWV    Arthritis       HPI comes in for medicar AWV and checkup. Tends to avoid prescription medications in favor of unproven otc products    Review of Systems   Constitutional:  Positive for fatigue. Negative for activity change, appetite change and unexpected weight change. HENT:  Positive for congestion and sinus pressure. Eyes:  Negative for photophobia, redness and visual disturbance. Respiratory:  Negative for cough, shortness of breath and wheezing. Cardiovascular:  Positive for leg swelling. Negative for chest pain and palpitations. Gastrointestinal:  Negative for abdominal pain and blood in stool. Genitourinary:  Negative for dysuria, hematuria and pelvic pain. Musculoskeletal:  Positive for arthralgias. Skin:  Negative for pallor and rash. Neurological:  Negative for tremors, seizures, syncope, weakness and numbness. Psychiatric/Behavioral:  Positive for confusion. The patient is nervous/anxious. All other systems reviewed and are negative. Current Outpatient Medications:     triamcinolone (KENALOG) 0.1 % cream, Apply topically 2 times daily. , Disp: 453.6 g, Rfl: 2    Probiotic Product (PA PROBIOTIC COMPLEX PO), Take by mouth, Disp: , Rfl:     aspirin 81 MG chewable tablet, Take 1 tablet by mouth daily, Disp: , Rfl:     Polyvinyl Alcohol (LUBRICANT DROPS OP), Apply to eye, Disp: , Rfl:     NONFORMULARY, Silver biotics, Disp: , Rfl:     Omega-3 Fatty Acids (FISH OIL) 1000 MG CAPS, Take 1 capsule by mouth daily, Disp: , Rfl:     Cholecalciferol (VITAMIN D3) 5000 units TABS, Take 1 tablet by mouth daily, Disp: , Rfl:     POLICOSANOL PO, Take by mouth, Disp: , Rfl:     Turmeric 500 MG CAPS, Take by mouth, Disp: , Rfl:     MAGNESIUM PO, Take by mouth, Disp: , Rfl:     Multiple Vitamins-Minerals (THERAPEUTIC

## 2023-09-26 DIAGNOSIS — R73.9 HYPERGLYCEMIA: ICD-10-CM

## 2023-09-26 DIAGNOSIS — E78.49 OTHER HYPERLIPIDEMIA: ICD-10-CM

## 2023-09-26 DIAGNOSIS — E03.9 HYPOTHYROIDISM, UNSPECIFIED TYPE: ICD-10-CM

## 2023-09-26 LAB
CHOLESTEROL: 294 MG/DL
HBA1C MFR BLD: 6.1 % (ref 4–5.6)
HDLC SERPL-MCNC: 65 MG/DL
LDL CHOLESTEROL: 211 MG/DL
T4 FREE: 1 NG/DL (ref 0.9–1.7)
TRIGL SERPL-MCNC: 89 MG/DL
TSH SERPL DL<=0.05 MIU/L-ACNC: 5.07 UIU/ML (ref 0.27–4.2)
VLDLC SERPL CALC-MCNC: 18 MG/DL

## 2023-09-26 ASSESSMENT — ENCOUNTER SYMPTOMS
COUGH: 0
ABDOMINAL PAIN: 0
BLOOD IN STOOL: 0
PHOTOPHOBIA: 0
EYE REDNESS: 0
SINUS PRESSURE: 1
WHEEZING: 0
SHORTNESS OF BREATH: 0

## 2023-09-27 ENCOUNTER — HOSPITAL ENCOUNTER (EMERGENCY)
Age: 78
Discharge: HOME | End: 2023-09-27
Attending: STUDENT IN AN ORGANIZED HEALTH CARE EDUCATION/TRAINING PROGRAM
Payer: MEDICARE

## 2023-09-27 ENCOUNTER — APPOINTMENT (OUTPATIENT)
Dept: CT IMAGING | Age: 78
End: 2023-09-27
Payer: MEDICARE

## 2023-09-27 VITALS
TEMPERATURE: 97.9 F | BODY MASS INDEX: 33.8 KG/M2 | SYSTOLIC BLOOD PRESSURE: 186 MMHG | RESPIRATION RATE: 17 BRPM | HEART RATE: 75 BPM | WEIGHT: 198 LBS | OXYGEN SATURATION: 99 % | HEIGHT: 64 IN | DIASTOLIC BLOOD PRESSURE: 113 MMHG

## 2023-09-27 DIAGNOSIS — N20.0 RENAL CALCULUS: Primary | ICD-10-CM

## 2023-09-27 DIAGNOSIS — N13.30 HYDRONEPHROSIS OF LEFT KIDNEY: ICD-10-CM

## 2023-09-27 LAB
ALBUMIN SERPL-MCNC: 4.2 G/DL (ref 3.5–5.2)
ALP SERPL-CCNC: 58 U/L (ref 35–104)
ALT SERPL-CCNC: 18 U/L (ref 0–32)
ANION GAP SERPL CALCULATED.3IONS-SCNC: 12 MMOL/L (ref 7–16)
AST SERPL-CCNC: 17 U/L (ref 0–31)
BASOPHILS # BLD: 0.03 K/UL (ref 0–0.2)
BASOPHILS NFR BLD: 0 % (ref 0–2)
BILIRUB SERPL-MCNC: 0.6 MG/DL (ref 0–1.2)
BILIRUB UR QL STRIP: NEGATIVE
BUN SERPL-MCNC: 15 MG/DL (ref 6–23)
CALCIUM SERPL-MCNC: 9.8 MG/DL (ref 8.6–10.2)
CHLORIDE SERPL-SCNC: 100 MMOL/L (ref 98–107)
CLARITY UR: CLEAR
CO2 SERPL-SCNC: 24 MMOL/L (ref 22–29)
COLOR UR: YELLOW
CREAT SERPL-MCNC: 0.8 MG/DL (ref 0.5–1)
EOSINOPHIL # BLD: 0 K/UL (ref 0.05–0.5)
EOSINOPHILS RELATIVE PERCENT: 0 % (ref 0–6)
EPI CELLS #/AREA URNS HPF: ABNORMAL /HPF
ERYTHROCYTE [DISTWIDTH] IN BLOOD BY AUTOMATED COUNT: 13.2 % (ref 11.5–15)
GFR SERPL CREATININE-BSD FRML MDRD: >60 ML/MIN/1.73M2
GLUCOSE SERPL-MCNC: 120 MG/DL (ref 74–99)
GLUCOSE UR STRIP-MCNC: NEGATIVE MG/DL
HCT VFR BLD AUTO: 43.7 % (ref 34–48)
HGB BLD-MCNC: 14.5 G/DL (ref 11.5–15.5)
HGB UR QL STRIP.AUTO: ABNORMAL
IMM GRANULOCYTES # BLD AUTO: 0.04 K/UL (ref 0–0.58)
IMM GRANULOCYTES NFR BLD: 0 % (ref 0–5)
KETONES UR STRIP-MCNC: NEGATIVE MG/DL
LACTATE BLDV-SCNC: 1.1 MMOL/L (ref 0.5–2.2)
LEUKOCYTE ESTERASE UR QL STRIP: NEGATIVE
LIPASE SERPL-CCNC: 25 U/L (ref 13–60)
LYMPHOCYTES NFR BLD: 0.53 K/UL (ref 1.5–4)
LYMPHOCYTES RELATIVE PERCENT: 5 % (ref 20–42)
MCH RBC QN AUTO: 30.3 PG (ref 26–35)
MCHC RBC AUTO-ENTMCNC: 33.2 G/DL (ref 32–34.5)
MCV RBC AUTO: 91.2 FL (ref 80–99.9)
MONOCYTES NFR BLD: 0.85 K/UL (ref 0.1–0.95)
MONOCYTES NFR BLD: 9 % (ref 2–12)
NEUTROPHILS NFR BLD: 86 % (ref 43–80)
NEUTS SEG NFR BLD: 8.53 K/UL (ref 1.8–7.3)
NITRITE UR QL STRIP: NEGATIVE
PH UR STRIP: 6 [PH] (ref 5–9)
PLATELET # BLD AUTO: 280 K/UL (ref 130–450)
PMV BLD AUTO: 10 FL (ref 7–12)
POTASSIUM SERPL-SCNC: 4 MMOL/L (ref 3.5–5)
PROT SERPL-MCNC: 7.2 G/DL (ref 6.4–8.3)
PROT UR STRIP-MCNC: NEGATIVE MG/DL
RBC # BLD AUTO: 4.79 M/UL (ref 3.5–5.5)
RBC #/AREA URNS HPF: ABNORMAL /HPF
SODIUM SERPL-SCNC: 136 MMOL/L (ref 132–146)
SP GR UR STRIP: 1.01 (ref 1–1.03)
UROBILINOGEN UR STRIP-ACNC: 0.2 EU/DL (ref 0–1)
WBC #/AREA URNS HPF: ABNORMAL /HPF
WBC OTHER # BLD: 10 K/UL (ref 4.5–11.5)

## 2023-09-27 PROCEDURE — 74176 CT ABD & PELVIS W/O CONTRAST: CPT

## 2023-09-27 PROCEDURE — 85025 COMPLETE CBC W/AUTO DIFF WBC: CPT

## 2023-09-27 PROCEDURE — 83690 ASSAY OF LIPASE: CPT

## 2023-09-27 PROCEDURE — 80053 COMPREHEN METABOLIC PANEL: CPT

## 2023-09-27 PROCEDURE — 83605 ASSAY OF LACTIC ACID: CPT

## 2023-09-27 PROCEDURE — 81001 URINALYSIS AUTO W/SCOPE: CPT

## 2023-09-27 PROCEDURE — 99284 EMERGENCY DEPT VISIT MOD MDM: CPT

## 2023-09-27 ASSESSMENT — PAIN SCALES - GENERAL: PAINLEVEL_OUTOF10: 8

## 2023-09-27 ASSESSMENT — PAIN DESCRIPTION - ORIENTATION: ORIENTATION: LEFT

## 2023-09-27 ASSESSMENT — PAIN - FUNCTIONAL ASSESSMENT: PAIN_FUNCTIONAL_ASSESSMENT: 0-10

## 2023-09-27 ASSESSMENT — PAIN DESCRIPTION - PAIN TYPE: TYPE: ACUTE PAIN

## 2023-09-27 ASSESSMENT — PAIN DESCRIPTION - FREQUENCY: FREQUENCY: CONTINUOUS

## 2023-09-27 ASSESSMENT — PAIN DESCRIPTION - DESCRIPTORS: DESCRIPTORS: SHARP

## 2023-09-28 NOTE — DISCHARGE INSTRUCTIONS
CT ABDOMEN PELVIS WO CONTRAST Additional Contrast? None   Final Result   4 mm nonobstructing calculus superior pole left kidney. 2 mm nonobstructing   calculus inferior pole left kidney. Mild to moderate left hydronephrosis   with perinephric fat stranding and fluid. Correlate with recently passed   stone clinically.

## 2023-09-29 ASSESSMENT — ENCOUNTER SYMPTOMS
ABDOMINAL PAIN: 1
DIARRHEA: 0
SINUS PRESSURE: 0
WHEEZING: 0
NAUSEA: 1
EYE PAIN: 0
VOMITING: 1
ABDOMINAL DISTENTION: 0
BACK PAIN: 0
COUGH: 0
SORE THROAT: 0
EYE REDNESS: 0
SHORTNESS OF BREATH: 0
EYE DISCHARGE: 0

## 2024-02-20 LAB
25(OH)D3 SERPL-MCNC: 81.2 NG/ML (ref 30–100)
ANION GAP SERPL CALCULATED.3IONS-SCNC: 18 MMOL/L (ref 7–16)
BUN SERPL-MCNC: 20 MG/DL (ref 6–23)
CALCIUM SERPL-MCNC: 9.7 MG/DL (ref 8.6–10.2)
CHLORIDE SERPL-SCNC: 104 MMOL/L (ref 98–107)
CO2 SERPL-SCNC: 19 MMOL/L (ref 22–29)
CREAT SERPL-MCNC: 0.9 MG/DL (ref 0.5–1)
GFR SERPL CREATININE-BSD FRML MDRD: >60 ML/MIN/1.73M2
GLUCOSE SERPL-MCNC: 124 MG/DL (ref 74–99)
MAGNESIUM SERPL-MCNC: 2 MG/DL (ref 1.6–2.6)
PHOSPHATE SERPL-MCNC: 3.5 MG/DL (ref 2.5–4.5)
POTASSIUM SERPL-SCNC: 4.1 MMOL/L (ref 3.5–5)
PTH-INTACT SERPL-MCNC: 19 PG/ML (ref 15–65)
SODIUM SERPL-SCNC: 141 MMOL/L (ref 132–146)
URATE SERPL-MCNC: 7.2 MG/DL (ref 2.4–5.7)

## 2024-04-25 LAB
MICROORGANISM SPEC CULT: ABNORMAL
SPECIMEN DESCRIPTION: ABNORMAL

## 2024-05-06 NOTE — PROGRESS NOTES
Patient instructed on s/s infection/complication with PICC line to report and instructed on keeping PICC dressing clean, dry and intact patient verbalizes understanding. Tolerated infusion well. Reviewed therapy plan, offered education material and/or discharge material, reviewed medication information and signs and symptoms  and educated on possible side effects, verbalizes good knowledge of current plan patient verbalizes understanding, and has no signs or symptoms to report at this time. Patient discharged. Patient alert and oriented x3. No distress noted. Vital signs stable. Patient denies any new or worsening pain. Patient denies any needs. All questions answered. Next appointment scheduled.  Declines copy of AVS.
4 = No assist / stand by assistance

## 2024-08-22 ENCOUNTER — HOSPITAL ENCOUNTER (EMERGENCY)
Age: 79
Discharge: HOME | End: 2024-08-22
Attending: EMERGENCY MEDICINE
Payer: MEDICARE

## 2024-08-22 ENCOUNTER — APPOINTMENT (OUTPATIENT)
Dept: CT IMAGING | Age: 79
End: 2024-08-22
Payer: MEDICARE

## 2024-08-22 VITALS
HEART RATE: 71 BPM | BODY MASS INDEX: 32.44 KG/M2 | HEIGHT: 64 IN | OXYGEN SATURATION: 100 % | RESPIRATION RATE: 16 BRPM | TEMPERATURE: 97.7 F | SYSTOLIC BLOOD PRESSURE: 165 MMHG | WEIGHT: 190 LBS | DIASTOLIC BLOOD PRESSURE: 72 MMHG

## 2024-08-22 DIAGNOSIS — N12 PYELONEPHRITIS: Primary | ICD-10-CM

## 2024-08-22 DIAGNOSIS — N94.9 ADNEXAL CYST: ICD-10-CM

## 2024-08-22 LAB
ALBUMIN SERPL-MCNC: 4.8 G/DL (ref 3.5–5.2)
ALP SERPL-CCNC: 75 U/L (ref 35–104)
ALT SERPL-CCNC: 21 U/L (ref 0–32)
ANION GAP SERPL CALCULATED.3IONS-SCNC: 14 MMOL/L (ref 7–16)
AST SERPL-CCNC: 19 U/L (ref 0–31)
BACTERIA URNS QL MICRO: ABNORMAL
BASOPHILS # BLD: 0.02 K/UL (ref 0–0.2)
BASOPHILS NFR BLD: 0 % (ref 0–2)
BILIRUB SERPL-MCNC: 0.8 MG/DL (ref 0–1.2)
BILIRUB UR QL STRIP: NEGATIVE
BUN SERPL-MCNC: 13 MG/DL (ref 6–23)
CALCIUM SERPL-MCNC: 10.2 MG/DL (ref 8.6–10.2)
CHLORIDE SERPL-SCNC: 102 MMOL/L (ref 98–107)
CLARITY UR: CLEAR
CO2 SERPL-SCNC: 25 MMOL/L (ref 22–29)
COLOR UR: YELLOW
CREAT SERPL-MCNC: 0.7 MG/DL (ref 0.5–1)
EOSINOPHIL # BLD: 0.04 K/UL (ref 0.05–0.5)
EOSINOPHILS RELATIVE PERCENT: 1 % (ref 0–6)
ERYTHROCYTE [DISTWIDTH] IN BLOOD BY AUTOMATED COUNT: 13 % (ref 11.5–15)
GFR, ESTIMATED: 89 ML/MIN/1.73M2
GLUCOSE SERPL-MCNC: 135 MG/DL (ref 74–99)
GLUCOSE UR STRIP-MCNC: NEGATIVE MG/DL
HCT VFR BLD AUTO: 47.2 % (ref 34–48)
HGB BLD-MCNC: 15.4 G/DL (ref 11.5–15.5)
HGB UR QL STRIP.AUTO: NEGATIVE
IMM GRANULOCYTES # BLD AUTO: <0.03 K/UL (ref 0–0.58)
IMM GRANULOCYTES NFR BLD: 0 % (ref 0–5)
KETONES UR STRIP-MCNC: NEGATIVE MG/DL
LEUKOCYTE ESTERASE UR QL STRIP: ABNORMAL
LIPASE SERPL-CCNC: 33 U/L (ref 13–60)
LYMPHOCYTES NFR BLD: 0.98 K/UL (ref 1.5–4)
LYMPHOCYTES RELATIVE PERCENT: 15 % (ref 20–42)
MCH RBC QN AUTO: 30.4 PG (ref 26–35)
MCHC RBC AUTO-ENTMCNC: 32.6 G/DL (ref 32–34.5)
MCV RBC AUTO: 93.3 FL (ref 80–99.9)
MONOCYTES NFR BLD: 0.61 K/UL (ref 0.1–0.95)
MONOCYTES NFR BLD: 9 % (ref 2–12)
NEUTROPHILS NFR BLD: 74 % (ref 43–80)
NEUTS SEG NFR BLD: 4.81 K/UL (ref 1.8–7.3)
NITRITE UR QL STRIP: POSITIVE
PH UR STRIP: 6 [PH] (ref 5–9)
PLATELET # BLD AUTO: 282 K/UL (ref 130–450)
PMV BLD AUTO: 9.4 FL (ref 7–12)
POTASSIUM SERPL-SCNC: 3.8 MMOL/L (ref 3.5–5)
PROT SERPL-MCNC: 8.3 G/DL (ref 6.4–8.3)
PROT UR STRIP-MCNC: NEGATIVE MG/DL
RBC # BLD AUTO: 5.06 M/UL (ref 3.5–5.5)
RBC #/AREA URNS HPF: ABNORMAL /HPF
SODIUM SERPL-SCNC: 141 MMOL/L (ref 132–146)
SP GR UR STRIP: <1.005 (ref 1–1.03)
UROBILINOGEN UR STRIP-ACNC: 0.2 EU/DL (ref 0–1)
WBC #/AREA URNS HPF: ABNORMAL /HPF
WBC OTHER # BLD: 6.5 K/UL (ref 4.5–11.5)

## 2024-08-22 PROCEDURE — 83690 ASSAY OF LIPASE: CPT

## 2024-08-22 PROCEDURE — 96375 TX/PRO/DX INJ NEW DRUG ADDON: CPT

## 2024-08-22 PROCEDURE — 96374 THER/PROPH/DIAG INJ IV PUSH: CPT

## 2024-08-22 PROCEDURE — 6360000002 HC RX W HCPCS: Performed by: EMERGENCY MEDICINE

## 2024-08-22 PROCEDURE — 80053 COMPREHEN METABOLIC PANEL: CPT

## 2024-08-22 PROCEDURE — 81001 URINALYSIS AUTO W/SCOPE: CPT

## 2024-08-22 PROCEDURE — 99284 EMERGENCY DEPT VISIT MOD MDM: CPT

## 2024-08-22 PROCEDURE — 74176 CT ABD & PELVIS W/O CONTRAST: CPT

## 2024-08-22 PROCEDURE — 6370000000 HC RX 637 (ALT 250 FOR IP): Performed by: EMERGENCY MEDICINE

## 2024-08-22 PROCEDURE — 87077 CULTURE AEROBIC IDENTIFY: CPT

## 2024-08-22 PROCEDURE — 87086 URINE CULTURE/COLONY COUNT: CPT

## 2024-08-22 PROCEDURE — 2580000003 HC RX 258: Performed by: EMERGENCY MEDICINE

## 2024-08-22 PROCEDURE — 85025 COMPLETE CBC W/AUTO DIFF WBC: CPT

## 2024-08-22 RX ORDER — CEFDINIR 300 MG/1
300 CAPSULE ORAL 2 TIMES DAILY
Qty: 20 CAPSULE | Refills: 0 | Status: SHIPPED | OUTPATIENT
Start: 2024-08-22 | End: 2024-09-01

## 2024-08-22 RX ORDER — ONDANSETRON 2 MG/ML
4 INJECTION INTRAMUSCULAR; INTRAVENOUS ONCE
Status: COMPLETED | OUTPATIENT
Start: 2024-08-22 | End: 2024-08-22

## 2024-08-22 RX ORDER — OXYCODONE HYDROCHLORIDE AND ACETAMINOPHEN 5; 325 MG/1; MG/1
1 TABLET ORAL ONCE
Status: COMPLETED | OUTPATIENT
Start: 2024-08-22 | End: 2024-08-22

## 2024-08-22 RX ORDER — 0.9 % SODIUM CHLORIDE 0.9 %
1000 INTRAVENOUS SOLUTION INTRAVENOUS ONCE
Status: COMPLETED | OUTPATIENT
Start: 2024-08-22 | End: 2024-08-22

## 2024-08-22 RX ORDER — OXYCODONE HYDROCHLORIDE AND ACETAMINOPHEN 5; 325 MG/1; MG/1
1 TABLET ORAL EVERY 6 HOURS PRN
Qty: 7 TABLET | Refills: 0 | Status: SHIPPED | OUTPATIENT
Start: 2024-08-22 | End: 2024-08-24

## 2024-08-22 RX ADMIN — OXYCODONE HYDROCHLORIDE AND ACETAMINOPHEN 1 TABLET: 5; 325 TABLET ORAL at 08:32

## 2024-08-22 RX ADMIN — ONDANSETRON 4 MG: 2 INJECTION INTRAMUSCULAR; INTRAVENOUS at 08:31

## 2024-08-22 RX ADMIN — SODIUM CHLORIDE 1000 ML: 9 INJECTION, SOLUTION INTRAVENOUS at 08:33

## 2024-08-22 RX ADMIN — WATER 1000 MG: 1 INJECTION INTRAMUSCULAR; INTRAVENOUS; SUBCUTANEOUS at 09:19

## 2024-08-22 ASSESSMENT — ENCOUNTER SYMPTOMS
SHORTNESS OF BREATH: 0
EYE PAIN: 0
WHEEZING: 0
NAUSEA: 1
ABDOMINAL DISTENTION: 0
ABDOMINAL PAIN: 1
SINUS PRESSURE: 0
BACK PAIN: 0
VOMITING: 1
EYE REDNESS: 0
DIARRHEA: 0
SORE THROAT: 0
EYE DISCHARGE: 0
COUGH: 0

## 2024-08-22 ASSESSMENT — PAIN DESCRIPTION - DESCRIPTORS
DESCRIPTORS: ACHING
DESCRIPTORS: ACHING;DISCOMFORT

## 2024-08-22 ASSESSMENT — LIFESTYLE VARIABLES
HOW OFTEN DO YOU HAVE A DRINK CONTAINING ALCOHOL: NEVER
HOW MANY STANDARD DRINKS CONTAINING ALCOHOL DO YOU HAVE ON A TYPICAL DAY: PATIENT DOES NOT DRINK

## 2024-08-22 ASSESSMENT — PAIN - FUNCTIONAL ASSESSMENT
PAIN_FUNCTIONAL_ASSESSMENT: 0-10
PAIN_FUNCTIONAL_ASSESSMENT: PREVENTS OR INTERFERES WITH MANY ACTIVE NOT PASSIVE ACTIVITIES

## 2024-08-22 ASSESSMENT — PAIN SCALES - GENERAL
PAINLEVEL_OUTOF10: 7
PAINLEVEL_OUTOF10: 8

## 2024-08-22 ASSESSMENT — PAIN DESCRIPTION - LOCATION
LOCATION: FLANK
LOCATION: FLANK

## 2024-08-22 ASSESSMENT — PAIN DESCRIPTION - ORIENTATION
ORIENTATION: LEFT
ORIENTATION: RIGHT

## 2024-08-22 NOTE — ED PROVIDER NOTES
the right renal collecting system could represent   recently passed stone or urinary tract infection with correlation of   urinalysis. No obstructing urolithiasis evident.   2. Left intrarenal stone of left nephrolithiasis.   3. Sigmoid diverticulosis without acute diverticulitis.   4. Simple appearing right adnexal cystic lesion measuring up to 2.8 cm.             ------------------------- NURSING NOTES AND VITALS REVIEWED ---------------------------  Date / Time Roomed:  8/22/2024  7:19 AM  ED Bed Assignment:  DISPO/D01    The nursing notes within the ED encounter and vital signs as below have been reviewed.   BP (!) 165/72   Pulse 71   Temp 97.7 °F (36.5 °C) (Oral)   Resp 16   Ht 1.626 m (5' 4\")   Wt 86.2 kg (190 lb)   SpO2 100%   BMI 32.61 kg/m²   Oxygen Saturation Interpretation: Normal      ------------------------------------------ PROGRESS NOTES ------------------------------------------  I have spoken with the patient and discussed today’s results, in addition to providing specific details for the plan of care and counseling regarding the diagnosis and prognosis.  Their questions are answered at this time and they are agreeable with the plan. I discussed at length with them reasons for immediate return here for re evaluation. They will followup with their primary care physician by calling their office on Monday.      --------------------------------- ADDITIONAL PROVIDER NOTES ---------------------------------  At this time the patient is without objective evidence of an acute process requiring hospitalization or inpatient management.  They have remained hemodynamically stable throughout their entire ED visit and are stable for discharge with outpatient follow-up.     The plan has been discussed in detail and they are aware of the specific conditions for emergent return, as well as the importance of follow-up.      Discharge Medication List as of 8/22/2024 10:46 AM        START taking these

## 2024-08-25 LAB
MICROORGANISM SPEC CULT: ABNORMAL
SERVICE CMNT-IMP: ABNORMAL
SPECIMEN DESCRIPTION: ABNORMAL

## 2024-08-26 ENCOUNTER — TELEPHONE (OUTPATIENT)
Dept: FAMILY MEDICINE CLINIC | Age: 79
End: 2024-08-26

## 2024-08-26 NOTE — TELEPHONE ENCOUNTER
----- Message from Darío ANGEL sent at 8/26/2024 11:26 AM EDT -----  Regarding: [facility] Appointment Request  [facility] Appointment Request    Patient needs appointment for [facility] Appointment Type: ED Follow-Up.    Patient Requested Dates(s): Any day  Patient Requested Time: morning or mid-morning  Provider Name: Amish Mcduffie MD    Reason for Appointment Request: Established Patient - No appointments available during search.  ER ON AUGUST 21, 2024 / KIDNEY CONCERN / CT SCAN / UTI CONCERN.  --------------------------------------------------------------------------------------------------------------------------    Relationship to Patient: Self     Call Back Information: OK to leave message on [employer]  Preferred Call Back Number: Phone  [phone]

## 2024-08-26 NOTE — TELEPHONE ENCOUNTER
I called and spoke with patient and appointment is made.   Last Appointment:  9/25/2023  Future Appointments   Date Time Provider Department Center   9/3/2024  8:45 AM Amish Mcduffie MD [facility] DEP

## 2024-09-03 ENCOUNTER — OFFICE VISIT (OUTPATIENT)
Dept: FAMILY MEDICINE CLINIC | Age: 79
End: 2024-09-03

## 2024-09-03 VITALS
WEIGHT: 190.8 LBS | OXYGEN SATURATION: 99 % | BODY MASS INDEX: 32.58 KG/M2 | RESPIRATION RATE: 18 BRPM | HEIGHT: 64 IN | HEART RATE: 85 BPM | SYSTOLIC BLOOD PRESSURE: 124 MMHG | TEMPERATURE: 97.3 F | DIASTOLIC BLOOD PRESSURE: 74 MMHG

## 2024-09-03 DIAGNOSIS — M15.9 PRIMARY OSTEOARTHRITIS INVOLVING MULTIPLE JOINTS: ICD-10-CM

## 2024-09-03 DIAGNOSIS — N94.89 SIMPLE ADNEXAL CYST GREATER THAN 1 CM IN DIAMETER IN POSTMENOPAUSAL PATIENT: ICD-10-CM

## 2024-09-03 DIAGNOSIS — N95.8 SIMPLE ADNEXAL CYST GREATER THAN 1 CM IN DIAMETER IN POSTMENOPAUSAL PATIENT: ICD-10-CM

## 2024-09-03 DIAGNOSIS — Z12.31 ENCOUNTER FOR SCREENING MAMMOGRAM FOR MALIGNANT NEOPLASM OF BREAST: ICD-10-CM

## 2024-09-03 DIAGNOSIS — N20.0 RENAL CALCULI: Primary | ICD-10-CM

## 2024-09-03 ASSESSMENT — ENCOUNTER SYMPTOMS
SHORTNESS OF BREATH: 0
COLOR CHANGE: 0
WHEEZING: 0
EYE REDNESS: 0
COUGH: 0
CONSTIPATION: 0
TROUBLE SWALLOWING: 0
BLOOD IN STOOL: 0
EYES NEGATIVE: 1
CHEST TIGHTNESS: 0
ABDOMINAL PAIN: 0
PHOTOPHOBIA: 0
VOMITING: 0
DIARRHEA: 0

## 2024-09-03 ASSESSMENT — PATIENT HEALTH QUESTIONNAIRE - PHQ9
SUM OF ALL RESPONSES TO PHQ9 QUESTIONS 1 & 2: 0
SUM OF ALL RESPONSES TO PHQ QUESTIONS 1-9: 0
1. LITTLE INTEREST OR PLEASURE IN DOING THINGS: NOT AT ALL
SUM OF ALL RESPONSES TO PHQ QUESTIONS 1-9: 0
2. FEELING DOWN, DEPRESSED OR HOPELESS: NOT AT ALL

## 2024-09-03 NOTE — PROGRESS NOTES
OFFICE NOTE    9/3/24  Name: Sophie Encinas  :1945   Sex:female   Age:79 y.o.      SUBJECTIVE  Chief Complaint   Patient presents with    ED Follow-up     UTI          HPI Developed flank pain on right. CT showed perinephric stranding, may have just been UTI or could have passed a small stone. Has assymptomatic stone on left which should pass.    Review of Systems   Constitutional:  Positive for fatigue. Negative for activity change, appetite change, fever and unexpected weight change.        Lost 12 lbs in past year   HENT:  Positive for congestion, hearing loss and postnasal drip. Negative for ear pain and trouble swallowing.    Eyes: Negative.  Negative for photophobia, redness and visual disturbance.   Respiratory:  Negative for cough, chest tightness, shortness of breath and wheezing.    Cardiovascular:  Negative for chest pain, palpitations and leg swelling.   Gastrointestinal:  Negative for abdominal pain, blood in stool, constipation, diarrhea and vomiting.   Endocrine: Negative for cold intolerance, polydipsia and polyuria.   Genitourinary:  Positive for frequency and urgency. Negative for dysuria and hematuria.   Musculoskeletal:  Positive for arthralgias. Negative for gait problem and joint swelling.   Skin:  Negative for color change, pallor, rash and wound.   Allergic/Immunologic: Negative for environmental allergies and food allergies.   Neurological:  Negative for dizziness, tremors, seizures, syncope, weakness, numbness and headaches.   Hematological:  Negative for adenopathy. Does not bruise/bleed easily.   Psychiatric/Behavioral:  Negative for behavioral problems, confusion, dysphoric mood and sleep disturbance. The patient is not nervous/anxious.             Current Outpatient Medications:     Handicap Placard MISC, by Does not apply route 5 year, Disp: 1 each, Rfl: 0    triamcinolone (KENALOG) 0.1 % cream, Apply topically 2 times daily., Disp: 453.6 g, Rfl: 2    Probiotic Product (PA

## 2025-01-16 DIAGNOSIS — Z12.31 ENCOUNTER FOR SCREENING MAMMOGRAM FOR MALIGNANT NEOPLASM OF BREAST: ICD-10-CM

## 2025-02-13 ENCOUNTER — TELEPHONE (OUTPATIENT)
Dept: PRIMARY CARE CLINIC | Age: 80
End: 2025-02-13

## 2025-02-18 ENCOUNTER — OFFICE VISIT (OUTPATIENT)
Dept: PRIMARY CARE CLINIC | Age: 80
End: 2025-02-18

## 2025-02-18 DIAGNOSIS — R94.31 ABNORMAL EKG: ICD-10-CM

## 2025-02-18 DIAGNOSIS — E78.2 MIXED HYPERLIPIDEMIA: Primary | ICD-10-CM

## 2025-02-18 DIAGNOSIS — R73.03 PRE-DIABETES: ICD-10-CM

## 2025-02-18 DIAGNOSIS — E55.9 VITAMIN D DEFICIENCY: ICD-10-CM

## 2025-02-18 DIAGNOSIS — N20.0 KIDNEY STONE: ICD-10-CM

## 2025-02-18 DIAGNOSIS — I51.7 CARDIOMEGALY: ICD-10-CM

## 2025-02-18 DIAGNOSIS — E03.9 ACQUIRED HYPOTHYROIDISM: ICD-10-CM

## 2025-02-18 DIAGNOSIS — E53.8 VITAMIN B 12 DEFICIENCY: ICD-10-CM

## 2025-02-18 DIAGNOSIS — I10 ESSENTIAL HYPERTENSION: ICD-10-CM

## 2025-02-18 DIAGNOSIS — N39.0 RECURRENT UTI: ICD-10-CM

## 2025-02-18 NOTE — PROGRESS NOTES
or taking them incorrectly.  A list of medications is being sent home with patient today.    Check blood pressure at home twice a day.  Low-salt low caffeine diet.  Call if systolic blood pressure is above 150 and diastolic blood pressures above 85.  Only use a upper arm digital cuff.    Aggressive low-fat diet.  Avoid red meats, greasy fried foods, dairy products.  Avoid processed foods.  Take cholesterol medications without food.    I informed patient about the risk associated with noncompliance of medication and taking medications incorrectly.  Appropriate follow-up with myself and all specialist.  Encourage family members to take active role in assisting with medications and medical care.  If any confusion should develop to notify my office immediately to avoid risk of worsening medical condition    A great deal of time spent reviewing medications, diet, exercise, social issues. Also reviewing the chart before entering the room with patient and finishing charting after leaving patient's room. More than half of that time was spent face to face with the patient in counseling and coordinating care.      Follow Up: Return in about 2 weeks (around 3/4/2025) for With EKG, Lab Before.     Seen by:  Obed Savage DO

## 2025-02-19 VITALS
DIASTOLIC BLOOD PRESSURE: 82 MMHG | TEMPERATURE: 98.8 F | RESPIRATION RATE: 18 BRPM | SYSTOLIC BLOOD PRESSURE: 132 MMHG | HEART RATE: 91 BPM | BODY MASS INDEX: 36.63 KG/M2 | OXYGEN SATURATION: 97 % | WEIGHT: 194 LBS | HEIGHT: 61 IN

## 2025-02-19 PROBLEM — N39.0 RECURRENT UTI: Status: ACTIVE | Noted: 2025-02-19

## 2025-02-19 PROBLEM — E03.9 ACQUIRED HYPOTHYROIDISM: Status: ACTIVE | Noted: 2025-02-19

## 2025-02-19 PROBLEM — I51.7 CARDIOMEGALY: Chronic | Status: ACTIVE | Noted: 2025-02-19

## 2025-02-19 PROBLEM — E78.2 MIXED HYPERLIPIDEMIA: Chronic | Status: ACTIVE | Noted: 2025-02-19

## 2025-02-19 PROBLEM — N20.1 URETERAL STONE: Status: RESOLVED | Noted: 2022-06-24 | Resolved: 2025-02-19

## 2025-02-19 PROBLEM — N20.0 NEPHROLITHIASIS: Status: RESOLVED | Noted: 2023-09-25 | Resolved: 2025-02-19

## 2025-02-19 PROBLEM — A41.51 SEPTICEMIA DUE TO E. COLI (HCC): Status: RESOLVED | Noted: 2022-06-27 | Resolved: 2025-02-19

## 2025-02-19 PROBLEM — N13.30 HYDRONEPHROSIS: Status: RESOLVED | Noted: 2022-06-27 | Resolved: 2025-02-19

## 2025-02-19 PROBLEM — R94.31 ABNORMAL EKG: Status: ACTIVE | Noted: 2025-02-19

## 2025-02-19 PROBLEM — E03.9 ACQUIRED HYPOTHYROIDISM: Chronic | Status: ACTIVE | Noted: 2025-02-19

## 2025-02-19 PROBLEM — N39.0 RECURRENT UTI: Chronic | Status: ACTIVE | Noted: 2025-02-19

## 2025-02-19 PROBLEM — I51.7 CARDIOMEGALY: Status: ACTIVE | Noted: 2025-02-19

## 2025-02-19 PROBLEM — E78.2 MIXED HYPERLIPIDEMIA: Status: ACTIVE | Noted: 2025-02-19

## 2025-02-19 PROBLEM — R94.31 ABNORMAL EKG: Chronic | Status: ACTIVE | Noted: 2025-02-19

## 2025-02-19 PROBLEM — N20.0 KIDNEY STONE: Chronic | Status: ACTIVE | Noted: 2023-09-25

## 2025-02-19 SDOH — ECONOMIC STABILITY: FOOD INSECURITY: WITHIN THE PAST 12 MONTHS, THE FOOD YOU BOUGHT JUST DIDN'T LAST AND YOU DIDN'T HAVE MONEY TO GET MORE.: NEVER TRUE

## 2025-02-19 SDOH — ECONOMIC STABILITY: FOOD INSECURITY: WITHIN THE PAST 12 MONTHS, YOU WORRIED THAT YOUR FOOD WOULD RUN OUT BEFORE YOU GOT MONEY TO BUY MORE.: NEVER TRUE

## 2025-02-19 ASSESSMENT — PATIENT HEALTH QUESTIONNAIRE - PHQ9
SUM OF ALL RESPONSES TO PHQ QUESTIONS 1-9: 0
2. FEELING DOWN, DEPRESSED OR HOPELESS: NOT AT ALL
SUM OF ALL RESPONSES TO PHQ QUESTIONS 1-9: 0
SUM OF ALL RESPONSES TO PHQ9 QUESTIONS 1 & 2: 0
1. LITTLE INTEREST OR PLEASURE IN DOING THINGS: NOT AT ALL

## 2025-02-19 ASSESSMENT — ENCOUNTER SYMPTOMS
EYES NEGATIVE: 1
GASTROINTESTINAL NEGATIVE: 1
RESPIRATORY NEGATIVE: 1
ALLERGIC/IMMUNOLOGIC NEGATIVE: 1

## 2025-02-26 DIAGNOSIS — E53.8 VITAMIN B 12 DEFICIENCY: ICD-10-CM

## 2025-02-26 DIAGNOSIS — E03.9 ACQUIRED HYPOTHYROIDISM: ICD-10-CM

## 2025-02-26 DIAGNOSIS — E78.2 MIXED HYPERLIPIDEMIA: ICD-10-CM

## 2025-02-26 DIAGNOSIS — N39.0 RECURRENT UTI: ICD-10-CM

## 2025-02-26 DIAGNOSIS — I10 ESSENTIAL HYPERTENSION: ICD-10-CM

## 2025-02-26 DIAGNOSIS — R73.03 PRE-DIABETES: ICD-10-CM

## 2025-02-26 DIAGNOSIS — E55.9 VITAMIN D DEFICIENCY: ICD-10-CM

## 2025-02-26 DIAGNOSIS — N20.0 KIDNEY STONE: ICD-10-CM

## 2025-02-26 LAB
ALBUMIN: 4.1 G/DL (ref 3.5–5.2)
ALP BLD-CCNC: 68 U/L (ref 35–104)
ALT SERPL-CCNC: 16 U/L (ref 0–32)
ANION GAP SERPL CALCULATED.3IONS-SCNC: 14 MMOL/L (ref 7–16)
AST SERPL-CCNC: 18 U/L (ref 0–31)
BACTERIA: ABNORMAL
BASOPHILS ABSOLUTE: 0.02 K/UL (ref 0–0.2)
BASOPHILS RELATIVE PERCENT: 0 % (ref 0–2)
BILIRUB SERPL-MCNC: 0.6 MG/DL (ref 0–1.2)
BILIRUBIN, URINE: NEGATIVE
BUN BLDV-MCNC: 17 MG/DL (ref 6–23)
CALCIUM SERPL-MCNC: 9.7 MG/DL (ref 8.6–10.2)
CHLORIDE BLD-SCNC: 103 MMOL/L (ref 98–107)
CHOLESTEROL, TOTAL: 255 MG/DL
CO2: 25 MMOL/L (ref 22–29)
COLOR, UA: YELLOW
CREAT SERPL-MCNC: 0.7 MG/DL (ref 0.5–1)
EOSINOPHILS ABSOLUTE: 0.12 K/UL (ref 0.05–0.5)
EOSINOPHILS RELATIVE PERCENT: 3 % (ref 0–6)
GFR, ESTIMATED: 86 ML/MIN/1.73M2
GLUCOSE BLD-MCNC: 119 MG/DL (ref 74–99)
GLUCOSE URINE: NEGATIVE MG/DL
HBA1C MFR BLD: 6 % (ref 4–5.6)
HCT VFR BLD CALC: 44.2 % (ref 34–48)
HDLC SERPL-MCNC: 68 MG/DL
HEMOGLOBIN: 13.7 G/DL (ref 11.5–15.5)
IMMATURE GRANULOCYTES %: 0 % (ref 0–5)
IMMATURE GRANULOCYTES ABSOLUTE: <0.03 K/UL (ref 0–0.58)
KETONES, URINE: NEGATIVE MG/DL
LDL CHOLESTEROL: 172 MG/DL
LEUKOCYTE ESTERASE, URINE: ABNORMAL
LYMPHOCYTES ABSOLUTE: 0.93 K/UL (ref 1.5–4)
LYMPHOCYTES RELATIVE PERCENT: 21 % (ref 20–42)
MCH RBC QN AUTO: 29.9 PG (ref 26–35)
MCHC RBC AUTO-ENTMCNC: 31 G/DL (ref 32–34.5)
MCV RBC AUTO: 96.5 FL (ref 80–99.9)
MONOCYTES ABSOLUTE: 0.77 K/UL (ref 0.1–0.95)
MONOCYTES RELATIVE PERCENT: 17 % (ref 2–12)
NEUTROPHILS ABSOLUTE: 2.64 K/UL (ref 1.8–7.3)
NEUTROPHILS RELATIVE PERCENT: 59 % (ref 43–80)
NITRITE, URINE: NEGATIVE
PDW BLD-RTO: 13.3 % (ref 11.5–15)
PH, URINE: 6.5 (ref 5–8)
PLATELET # BLD: 253 K/UL (ref 130–450)
PMV BLD AUTO: 11 FL (ref 7–12)
POTASSIUM SERPL-SCNC: 4.6 MMOL/L (ref 3.5–5)
PROTEIN UA: NEGATIVE MG/DL
RBC # BLD: 4.58 M/UL (ref 3.5–5.5)
RBC UA: ABNORMAL /HPF
SODIUM BLD-SCNC: 142 MMOL/L (ref 132–146)
SPECIFIC GRAVITY UA: 1.01 (ref 1–1.03)
THYROXINE (T4): 5.1 UG/DL (ref 4.5–11.7)
TOTAL PROTEIN: 7 G/DL (ref 6.4–8.3)
TRIGL SERPL-MCNC: 74 MG/DL
TSH SERPL DL<=0.05 MIU/L-ACNC: 4.94 UIU/ML (ref 0.27–4.2)
TURBIDITY: CLEAR
URIC ACID: 6.2 MG/DL (ref 2.4–5.7)
URINE HGB: NEGATIVE
UROBILINOGEN, URINE: 0.2 EU/DL (ref 0–1)
VITAMIN B-12: 740 PG/ML (ref 211–946)
VITAMIN D 25-HYDROXY: 77.3 NG/ML (ref 30–100)
VLDLC SERPL CALC-MCNC: 15 MG/DL
WBC # BLD: 4.5 K/UL (ref 4.5–11.5)
WBC UA: ABNORMAL /HPF

## 2025-03-06 ENCOUNTER — OFFICE VISIT (OUTPATIENT)
Dept: PRIMARY CARE CLINIC | Age: 80
End: 2025-03-06
Payer: MEDICARE

## 2025-03-06 VITALS
DIASTOLIC BLOOD PRESSURE: 82 MMHG | WEIGHT: 187 LBS | TEMPERATURE: 97.8 F | HEART RATE: 75 BPM | BODY MASS INDEX: 35.3 KG/M2 | OXYGEN SATURATION: 97 % | HEIGHT: 61 IN | SYSTOLIC BLOOD PRESSURE: 132 MMHG

## 2025-03-06 DIAGNOSIS — E53.8 VITAMIN B 12 DEFICIENCY: ICD-10-CM

## 2025-03-06 DIAGNOSIS — I10 ESSENTIAL HYPERTENSION: ICD-10-CM

## 2025-03-06 DIAGNOSIS — E03.9 ACQUIRED HYPOTHYROIDISM: Chronic | ICD-10-CM

## 2025-03-06 DIAGNOSIS — Z00.00 MEDICARE ANNUAL WELLNESS VISIT, SUBSEQUENT: Primary | ICD-10-CM

## 2025-03-06 DIAGNOSIS — E55.9 VITAMIN D DEFICIENCY: ICD-10-CM

## 2025-03-06 DIAGNOSIS — E11.9 DIET-CONTROLLED DIABETES MELLITUS (HCC): ICD-10-CM

## 2025-03-06 DIAGNOSIS — R94.31 ABNORMAL EKG: ICD-10-CM

## 2025-03-06 PROCEDURE — 3079F DIAST BP 80-89 MM HG: CPT | Performed by: FAMILY MEDICINE

## 2025-03-06 PROCEDURE — 3044F HG A1C LEVEL LT 7.0%: CPT | Performed by: FAMILY MEDICINE

## 2025-03-06 PROCEDURE — 3075F SYST BP GE 130 - 139MM HG: CPT | Performed by: FAMILY MEDICINE

## 2025-03-06 PROCEDURE — G0439 PPPS, SUBSEQ VISIT: HCPCS | Performed by: FAMILY MEDICINE

## 2025-03-06 PROCEDURE — 1123F ACP DISCUSS/DSCN MKR DOCD: CPT | Performed by: FAMILY MEDICINE

## 2025-03-06 PROCEDURE — 1159F MED LIST DOCD IN RCRD: CPT | Performed by: FAMILY MEDICINE

## 2025-03-06 PROCEDURE — 93000 ELECTROCARDIOGRAM COMPLETE: CPT | Performed by: FAMILY MEDICINE

## 2025-03-06 SDOH — ECONOMIC STABILITY: FOOD INSECURITY: WITHIN THE PAST 12 MONTHS, THE FOOD YOU BOUGHT JUST DIDN'T LAST AND YOU DIDN'T HAVE MONEY TO GET MORE.: NEVER TRUE

## 2025-03-06 SDOH — ECONOMIC STABILITY: FOOD INSECURITY: WITHIN THE PAST 12 MONTHS, YOU WORRIED THAT YOUR FOOD WOULD RUN OUT BEFORE YOU GOT MONEY TO BUY MORE.: NEVER TRUE

## 2025-03-06 ASSESSMENT — LIFESTYLE VARIABLES: HOW MANY STANDARD DRINKS CONTAINING ALCOHOL DO YOU HAVE ON A TYPICAL DAY: PATIENT DOES NOT DRINK

## 2025-03-06 NOTE — PROGRESS NOTES
medication-induced jitteriness. She was previously on a 12-week course of medication, during which she experienced insomnia.    She recalls a period of sleep disturbances and uncontrolled blood pressure, leading to a referral to Dr. Reddy, a nephrologist. Despite being on four different medications, she reported persistent fatigue and excessive sleepiness, even falling asleep while driving. This prompted her to discontinue the medications. She has since been working on restoring her metabolism.    She reports an improvement in her energy levels and has been engaging in outdoor activities.    Patient's complete Health Risk Assessment and screening values have been reviewed and are found in Flowsheets. The following problems were reviewed today and where indicated follow up appointments were made and/or referrals ordered.    Positive Risk Factor Screenings with Interventions:              Inactivity:  On average, how many days per week do you engage in moderate to strenuous exercise (like a brisk walk)?: 0 days (!) Abnormal  On average, how many minutes do you engage in exercise at this level?: 0 min  Interventions:  Patient declined any further interventions or treatment     Abnormal BMI (obese):  Body mass index is 35.33 kg/m². (!) Abnormal  Interventions:  Patient declines any further evaluation or treatment         Hearing Screen:  Do you or your family notice any trouble with your hearing that hasn't been managed with hearing aids?: (!) Yes    Interventions:  Patient declines any further evaluation or treatment         LDCT Screening: Discussed with patient the benefits and harms of screening, follow-up diagnostic testing, over-diagnosis, false positive rate, and total radiation exposure. Counseled on the importance of adherence to annual lung cancer LDCT screening, impact of comorbidities, ability and willingness to undergo diagnosis and treatment. Counseled on the importance of maintaining cigarette smoking

## 2025-03-06 NOTE — PATIENT INSTRUCTIONS
doctor can help you make a weight-loss plan that meets your needs.  You don't have to make a lot of big changes at once. A better idea might be to focus on small changes and stick with them. When those changes become habit, you can add a few more changes.  Some people find it helpful to take an exercise or nutrition class. If you have questions, ask your doctor about seeing a registered dietitian or an exercise specialist. You might also think about joining a weight-loss support group.  If you're not ready to make changes right now, try to pick a date in the future. Then make an appointment with your doctor to talk about when and how you'll get started with a plan.  Follow-up care is a key part of your treatment and safety. Be sure to make and go to all appointments, and call your doctor if you are having problems. It's also a good idea to know your test results and keep a list of the medicines you take.  How can you care for yourself as you start a weight-loss plan?  Set realistic goals. Many people expect to lose much more weight than is likely. A weight loss of 5% to 10% of your body weight may be enough to improve your health.  Get family and friends involved to provide support. Talk to them about why you are trying to lose weight, and ask them to help. They can help by participating in exercise and having meals with you, even if they may be eating something different.  Find what works best for you. If you do not have time or do not like to cook, a program that offers meal replacement bars or shakes may be better for you. Or if you like to prepare meals, finding a plan that includes daily menus and recipes may be best.  Ask your doctor about other health professionals who can help you achieve your weight-loss goals.  A dietitian can help you make healthy changes in your diet.  An exercise specialist or  can help you develop a safe and effective exercise program.  A counselor or psychiatrist can  Yes

## 2025-04-25 ENCOUNTER — HOSPITAL ENCOUNTER (EMERGENCY)
Age: 80
Discharge: HOME | End: 2025-04-25
Attending: STUDENT IN AN ORGANIZED HEALTH CARE EDUCATION/TRAINING PROGRAM
Payer: MEDICARE

## 2025-04-25 ENCOUNTER — OFFICE VISIT (OUTPATIENT)
Dept: FAMILY MEDICINE CLINIC | Age: 80
End: 2025-04-25
Payer: MEDICARE

## 2025-04-25 ENCOUNTER — APPOINTMENT (OUTPATIENT)
Dept: CT IMAGING | Age: 80
End: 2025-04-25
Payer: MEDICARE

## 2025-04-25 VITALS
BODY MASS INDEX: 33.13 KG/M2 | HEIGHT: 63 IN | SYSTOLIC BLOOD PRESSURE: 142 MMHG | OXYGEN SATURATION: 99 % | DIASTOLIC BLOOD PRESSURE: 92 MMHG | TEMPERATURE: 98.5 F | WEIGHT: 187 LBS | RESPIRATION RATE: 17 BRPM | HEART RATE: 87 BPM

## 2025-04-25 VITALS
OXYGEN SATURATION: 95 % | HEIGHT: 61 IN | TEMPERATURE: 99.2 F | WEIGHT: 187 LBS | SYSTOLIC BLOOD PRESSURE: 122 MMHG | HEART RATE: 80 BPM | BODY MASS INDEX: 35.3 KG/M2 | DIASTOLIC BLOOD PRESSURE: 80 MMHG

## 2025-04-25 DIAGNOSIS — S09.90XA INJURY OF HEAD, INITIAL ENCOUNTER: ICD-10-CM
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
## 2025-04-25 DIAGNOSIS — M54.2 NECK PAIN: ICD-10-CM

## 2025-04-25 DIAGNOSIS — S09.90XA CLOSED HEAD INJURY, INITIAL ENCOUNTER: Primary | ICD-10-CM
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
## 2025-04-25 DIAGNOSIS — S00.83XA CONTUSION OF FACE, INITIAL ENCOUNTER: ICD-10-CM

## 2025-04-25 DIAGNOSIS — R58 ECCHYMOSIS: ICD-10-CM

## 2025-04-25 PROCEDURE — 99284 EMERGENCY DEPT VISIT MOD MDM: CPT

## 2025-04-25 PROCEDURE — 70486 CT MAXILLOFACIAL W/O DYE: CPT

## 2025-04-25 PROCEDURE — 3074F SYST BP LT 130 MM HG: CPT | Performed by: NURSE PRACTITIONER

## 2025-04-25 PROCEDURE — 3079F DIAST BP 80-89 MM HG: CPT | Performed by: NURSE PRACTITIONER

## 2025-04-25 PROCEDURE — 72125 CT NECK SPINE W/O DYE: CPT

## 2025-04-25 PROCEDURE — 1159F MED LIST DOCD IN RCRD: CPT | Performed by: NURSE PRACTITIONER

## 2025-04-25 PROCEDURE — 99214 OFFICE O/P EST MOD 30 MIN: CPT | Performed by: NURSE PRACTITIONER

## 2025-04-25 PROCEDURE — 70450 CT HEAD/BRAIN W/O DYE: CPT

## 2025-04-25 PROCEDURE — 1123F ACP DISCUSS/DSCN MKR DOCD: CPT | Performed by: NURSE PRACTITIONER

## 2025-04-25 NOTE — ED NOTES
Department of Emergency Medicine  FIRST PROVIDER TRIAGE NOTE             Independent MLP           4/25/25  5:03 PM EDT    Date of Encounter: 4/25/25   MRN: [id number]      HPI: Sophie Encinas is a 80 y.o. female who presents to the ED for Eye Problem (Blurry vision, onset today. Patient fell/head head injury 1 week ago. +thinners. ) and Headache     FELL ONTO CONCRETE AND STONES A WEEK AGO.  WAS NOT EVALUATED UNTIL TODAY.  STRIPPED OVER SLIPPERS.     ROS: Negative for cp or sob.    PE: Gen Appearance/Constitutional: alert  HEENT: NC/NT. PERRLA,  Airway patent.    Provider-Patient relationship only established for Provider In Triage (PIT)  Full assessment, HPI and examination not performed, therefore, it is not yet possible to state whether or not an emergency medical condition exists   Focused assessment only during triage. This is not a comprehensive evaluation due to no physical ER space, staff shortage and high numbers of boarding patients in the ER.       Initial Plan of Care: All treatment areas with department are currently occupied. Plan to order/Initiate the following while awaiting opening in ED.  Initiate Treatment-Testing, Proceed toTreatment Area When Bed Available for ED Attending/MLP to Continue Care    Electronically signed by DAHIANA Hanna CNP   DD: 4/25/25      Gerry Chris APRN - CNP  04/25/25 [id number]

## 2025-04-25 NOTE — PROGRESS NOTES
arms or hardly walk    Levothyroxine Other (See Comments) and Hallucinations     nightmares    Morphine And Codeine Hives    Naproxen Hives    Oxycodone-Acetaminophen     Pcn [Penicillins]     Betamethasone Dipropionate Rash       Social History:     Social History     Tobacco Use    Smoking status: Never    Smokeless tobacco: Never   Vaping Use    Vaping status: Never Used   Substance Use Topics    Alcohol use: No     Comment: occasional glass of wine    Drug use: No       Patient lives at home.    Physical Exam:     Vitals:    04/25/25 1609   BP: 122/80   Pulse: 80   Temp: 99.2 °F (37.3 °C)   TempSrc: Temporal   SpO2: 95%   Weight: 84.8 kg (187 lb)   Height: 1.549 m (5' 1\")       Physical Exam (PE)    Physical Exam  Constitutional:       Appearance: Normal appearance.   HENT:      Head: Normocephalic.      Right Ear: Tympanic membrane, ear canal and external ear normal.      Left Ear: Tympanic membrane, ear canal and external ear normal.      Nose: Nose normal.      Mouth/Throat:      Mouth: Mucous membranes are moist.      Pharynx: Oropharynx is clear.   Eyes:      Pupils: Pupils are equal, round, and reactive to light.   Cardiovascular:      Rate and Rhythm: Normal rate and regular rhythm.      Pulses: Normal pulses.      Heart sounds: Normal heart sounds.   Pulmonary:      Effort: Pulmonary effort is normal.      Breath sounds: Normal breath sounds.   Abdominal:      General: Bowel sounds are normal.      Palpations: Abdomen is soft.   Musculoskeletal:         General: Normal range of motion.      Cervical back: Normal range of motion and neck supple. Tenderness present.   Skin:     General: Skin is warm and dry.      Capillary Refill: Capillary refill takes less than 2 seconds.      Comments: Face: Positive periorbital, maxillary, mandible ecchymosis edema, and TTP.  Positive hematoma above right eyebrow.   Neurological:      General: No focal deficit present.      Mental Status: She is alert and oriented to

## 2025-04-26 NOTE — ED PROVIDER NOTES
[facility] EMERGENCY DEPARTMENT  EMERGENCY DEPARTMENT ENCOUNTER        Pt Name: Sophie Encinas  MRN: [id number]  Birthdate 1945  Date of evaluation: 4/25/2025  Provider: Malini Quintero DO  PCP: Obed Savage DO  Note Started: 8:30 PM EDT 4/25/25    CHIEF COMPLAINT       Chief Complaint   Patient presents with    Eye Problem     Blurry vision, onset today. Patient fell/head head injury 1 week ago, no loc. +thinners.     Headache       HISTORY OF PRESENT ILLNESS: 1 or more Elements   History From: patient and     Limitations to history : None    Sophie Encinas is a 80 y.o. female with a history of hypertension, hyperlipidemia, hypothyroidism presenting to the emergency department complaining of a fall.  Patient states that she was wearing her slippers and fell outside on her pavers in her yard and landed forward on her face.  She states she takes a baby aspirin but really is on no other blood thinners.  She states that she was having some pain on the right side of her head and wanted evaluated for that.  She states that she was wearing her glasses when she fell and fell forward directly onto her glasses which were crooked and scratched.  Apparently today she had some blurry vision.  States that she was wearing her glasses during this episode and there is a scratch right over where she supposed to look out of her right eye.  She currently does not have any blurry vision.  She states that her vision is at her baseline at this time.  Patient denies any loss of consciousness, lightheadedness, dizziness, fevers, chills, chest pain, shortness of breath, abdominal pain, nausea, vomiting, diarrhea, numbness, tingling, lower weakness, recent hospitalization, recent loss, or other acute symptoms or concerns.     Nursing Notes were all reviewed and agreed with or any disagreements were addressed in the HPI.    REVIEW OF SYSTEMS :      Review of Systems    Positives  done/not done, ED Course, Reassessment, disposition considerations/shared decision making with patient, consults, disposition:        The patient is an 80-year-old female presenting to the emergency department complaining of headache and fall and eye problem.  Patient is hemodynamically stable, nontoxic, no acute distress.  Triage note states there is blurry vision but patient has no change in her vision at all.  She only complains of a headache which she has had since the fall.  NIH is 0 and there are no focal neurological deficits.  Extraocular movements are intact.     Patient is a non-smoker and there are no known social detriment to her health.  Prior records were reviewed and patient saw family doctor in the office for annual well care visit on 3/6/2025 and records reviewed from this visit.     Differential diagnosis includes but is not limited to concussion versus intracranial hemorrhage versus skull fracture versus facial bone fracture.      Patient's visual acuity was obtained without her glasses on and was found to be 20/50 and 20/40 in the right and left eye respectively.  She states that her vision feels like it normally is without her glasses.  She was able to place her glasses back on but they are pretty scratched over the lens.  She states that she does have an appointment with her ophthalmologist coming up.  I suspect this was causing some of her issues with her vision earlier today which have since resolved.  She has absolutely no visual symptoms at this time.  She has no focal neurological deficits.  CT head, cervical spine, and facial bones does not show any evidence of acute intracranial hemorrhage or fracture or other concerning abnormalities.  Patient has no other signs of injury.  She states that she would like to go home and follow-up with her doctor.  Patient did not receive any medications while in the emergency department.  States that she will continue taking Tylenol at home for pain

## 2025-04-26 NOTE — DISCHARGE INSTRUCTIONS
Use motrin and tylenol for pain control. Follow up with ophthalmologist. Follow up with family doctor. Return for worsening symptoms or other acute symptoms or concerns.

## 2025-04-30 ENCOUNTER — OFFICE VISIT (OUTPATIENT)
Dept: PRIMARY CARE CLINIC | Age: 80
End: 2025-04-30

## 2025-04-30 VITALS
DIASTOLIC BLOOD PRESSURE: 83 MMHG | RESPIRATION RATE: 16 BRPM | WEIGHT: 187 LBS | OXYGEN SATURATION: 98 % | TEMPERATURE: 97.6 F | SYSTOLIC BLOOD PRESSURE: 128 MMHG | BODY MASS INDEX: 33.13 KG/M2 | HEART RATE: 75 BPM

## 2025-04-30 DIAGNOSIS — I10 ESSENTIAL HYPERTENSION: ICD-10-CM

## 2025-04-30 DIAGNOSIS — S00.83XA CONTUSION OF CHIN, INITIAL ENCOUNTER: Primary | ICD-10-CM

## 2025-04-30 DIAGNOSIS — S00.83XA CONTUSION OF FOREHEAD, INITIAL ENCOUNTER: ICD-10-CM

## 2025-04-30 DIAGNOSIS — S16.1XXA STRAIN OF NECK MUSCLE, INITIAL ENCOUNTER: ICD-10-CM

## 2025-04-30 SDOH — ECONOMIC STABILITY: FOOD INSECURITY: WITHIN THE PAST 12 MONTHS, THE FOOD YOU BOUGHT JUST DIDN'T LAST AND YOU DIDN'T HAVE MONEY TO GET MORE.: NEVER TRUE

## 2025-04-30 SDOH — ECONOMIC STABILITY: FOOD INSECURITY: WITHIN THE PAST 12 MONTHS, YOU WORRIED THAT YOUR FOOD WOULD RUN OUT BEFORE YOU GOT MONEY TO BUY MORE.: NEVER TRUE

## 2025-04-30 ASSESSMENT — PATIENT HEALTH QUESTIONNAIRE - PHQ9
SUM OF ALL RESPONSES TO PHQ QUESTIONS 1-9: 0
1. LITTLE INTEREST OR PLEASURE IN DOING THINGS: NOT AT ALL
2. FEELING DOWN, DEPRESSED OR HOPELESS: NOT AT ALL

## 2025-04-30 NOTE — PROGRESS NOTES
Sophie Encinas (:  1945) is a 80 y.o. female,    here for evaluation of the following chief complaint(s):  Follow-up and Fall            Subjective   History of Present Illness  The patient presents for evaluation following a fall.    Approximately 1.5 weeks ago, she fell while retrieving meat from her freezer in the garage. The incident occurred as she was returning to the house, navigating over pavers and stones while wearing slippers and heavy wool socks. An awkward step or twist led to the fall, but she does not recall losing consciousness. She landed on gravel, resulting in minor injuries including 2 black eyes, a scrape on her nose, and redness of the skin. A small laceration on her chin bled slightly and remains tender. She was wearing her glasses at the time, which have since become scratched. No dental issues or nosebleeds were reported following the fall.    A week later, she sought medical attention due to an episode of blurry vision while driving home from a hair appointment. This symptom was unusual for her as it typically only occurs after a perm treatment, not a haircut. She was evaluated by a nurse practitioner who referred her to the hospital. At the hospital, a CT scan of her head revealed a small frontal scalp hematoma. No medications were prescribed upon discharge. She reports no current confusion or vision problems. Prior to the fall, she had been considering a new optometrist. She takes baby aspirin every other day and does not use any other blood thinners.    On 2024, she fell in her garage and broke her knee, requiring the use of a walker for about 5 weeks.    Review of Systems:  Constitutional:  No fever, no fatigue, no chills, no headaches, no weight change  Dermatology:  No rash, no mole, no dry or sensitive skin  ENT:  No cough, no sore throat, no sinus pain, no runny nose, no ear pain  Cardiology:  No chest pain, no palpitations, no leg edema, no shortness of breath,

## (undated) DEVICE — TUBING, SUCTION, 3/16" X 12', STRAIGHT: Brand: MEDLINE

## (undated) DEVICE — SOLUTION IV IRRIG WATER 1000ML POUR BRL 2F7114

## (undated) DEVICE — 4-PORT MANIFOLD: Brand: NEPTUNE 2

## (undated) DEVICE — SOLUTION SURG PREP ANTIMICROBIAL 4 OZ SKIN WND EXIDINE

## (undated) DEVICE — GLOVE ORANGE PI 8   MSG9080

## (undated) DEVICE — CYSTO PACK: Brand: MEDLINE INDUSTRIES, INC.

## (undated) DEVICE — GAUZE,SPONGE,4"X4",8PLY,STRL,LF,10/TRAY: Brand: MEDLINE

## (undated) DEVICE — GOWN,SIRUS,FABRNF,2XL,18/CS: Brand: MEDLINE

## (undated) DEVICE — GUIDEWIRE ENDO L150CM DIA0.035IN STR TIP

## (undated) DEVICE — BAG DRNGE COMB PK

## (undated) DEVICE — HOSE CONN FOR WST MGMT SYS NEPTUNE 2